# Patient Record
Sex: FEMALE | Race: WHITE | NOT HISPANIC OR LATINO | Employment: OTHER | URBAN - METROPOLITAN AREA
[De-identification: names, ages, dates, MRNs, and addresses within clinical notes are randomized per-mention and may not be internally consistent; named-entity substitution may affect disease eponyms.]

---

## 2017-03-14 DIAGNOSIS — J30.9 ALLERGIC RHINITIS: ICD-10-CM

## 2017-03-15 RX ORDER — FLUTICASONE PROPIONATE 50 MCG
SPRAY, SUSPENSION (ML) NASAL
Qty: 48 G | Refills: 0 | Status: SHIPPED | OUTPATIENT
Start: 2017-03-15 | End: 2017-06-12 | Stop reason: SDUPTHER

## 2017-04-05 ENCOUNTER — PATIENT MESSAGE (OUTPATIENT)
Dept: FAMILY MEDICINE | Facility: CLINIC | Age: 52
End: 2017-04-05

## 2017-04-10 RX ORDER — LORATADINE 10 MG/1
10 TABLET ORAL DAILY
Qty: 90 TABLET | Refills: 3 | Status: SHIPPED | OUTPATIENT
Start: 2017-04-10 | End: 2018-03-18 | Stop reason: SDUPTHER

## 2017-06-12 DIAGNOSIS — Z13.0 SCREENING, ANEMIA, DEFICIENCY, IRON: Primary | ICD-10-CM

## 2017-06-12 DIAGNOSIS — Z13.29 THYROID DISORDER SCREEN: ICD-10-CM

## 2017-06-12 DIAGNOSIS — J30.9 ALLERGIC RHINITIS: ICD-10-CM

## 2017-06-12 DIAGNOSIS — Z13.1 DIABETES MELLITUS SCREENING: ICD-10-CM

## 2017-06-12 DIAGNOSIS — Z13.220 SCREENING CHOLESTEROL LEVEL: ICD-10-CM

## 2017-06-12 DIAGNOSIS — Z11.59 NEED FOR HEPATITIS C SCREENING TEST: ICD-10-CM

## 2017-06-13 RX ORDER — FLUTICASONE PROPIONATE 50 MCG
SPRAY, SUSPENSION (ML) NASAL
Qty: 16 G | Refills: 0 | Status: SHIPPED | OUTPATIENT
Start: 2017-06-13 | End: 2017-06-19 | Stop reason: SDUPTHER

## 2017-06-13 NOTE — TELEPHONE ENCOUNTER
Please call Carly and notify her that I was only able to fill her medication for 1 month because it has been over 1 year since I last seen her.  Advised patient that she can come in for a medication refill visit to get refills for the year or I would recommend patient come in for a FULL WELLNESS exam where we would screen cholesterol, blood sugar, thyroid, etc.  If patient is interested in wellness - send me a note back and I will order labs.

## 2017-06-19 ENCOUNTER — OFFICE VISIT (OUTPATIENT)
Dept: FAMILY MEDICINE | Facility: CLINIC | Age: 52
End: 2017-06-19
Payer: COMMERCIAL

## 2017-06-19 VITALS
OXYGEN SATURATION: 97 % | TEMPERATURE: 98 F | SYSTOLIC BLOOD PRESSURE: 114 MMHG | DIASTOLIC BLOOD PRESSURE: 70 MMHG | BODY MASS INDEX: 25.38 KG/M2 | HEART RATE: 86 BPM | WEIGHT: 164.44 LBS

## 2017-06-19 DIAGNOSIS — G89.29 CHRONIC HIP PAIN, UNSPECIFIED LATERALITY: ICD-10-CM

## 2017-06-19 DIAGNOSIS — M25.559 CHRONIC HIP PAIN, UNSPECIFIED LATERALITY: ICD-10-CM

## 2017-06-19 DIAGNOSIS — J30.2 SEASONAL ALLERGIC RHINITIS, UNSPECIFIED ALLERGIC RHINITIS TRIGGER: Primary | ICD-10-CM

## 2017-06-19 PROCEDURE — 99213 OFFICE O/P EST LOW 20 MIN: CPT | Mod: S$GLB,,, | Performed by: NURSE PRACTITIONER

## 2017-06-19 PROCEDURE — 99999 PR PBB SHADOW E&M-EST. PATIENT-LVL III: CPT | Mod: PBBFAC,,, | Performed by: NURSE PRACTITIONER

## 2017-06-19 RX ORDER — FLUTICASONE PROPIONATE 50 MCG
2 SPRAY, SUSPENSION (ML) NASAL DAILY
Qty: 3 BOTTLE | Refills: 3 | Status: SHIPPED | OUTPATIENT
Start: 2017-06-19 | End: 2018-06-17 | Stop reason: SDUPTHER

## 2017-06-19 NOTE — PROGRESS NOTES
Subjective:       Patient ID: Carly Carmen is a 51 y.o. female.    Chief Complaint: Medication Refill    Patient is here today for medication refills.    Patient has chronic allergic rhinitis with postnasal drip that is now well controlled on Claritin and Flonase nasal spray - needs refill on Flonase.    Patient has chronic hip pain - DJD.  Takes Meloxicam for the hip pain - this is usually prescribed by her GYN MD but advised patient that I can fill medication if she needs it.    Patient voices no complaints today.  States she is here just for her yearly refill - she declined a full physical exam with fasting labs - states she has screenings at work with blood and does not want today.            Previous Medications    ESTRADIOL (ESTRACE) 1 MG TABLET        FLUTICASONE (FLONASE) 50 MCG/ACTUATION NASAL SPRAY    USE 2 SPRAYS IN EACH NOSTRIL ONCE DAILY    LORATADINE (CLARITIN) 10 MG TABLET    Take 1 tablet (10 mg total) by mouth once daily.    MEDROXYPROGESTERONE (PROVERA) 2.5 MG TABLET    Take 2.5 mg by mouth once daily.    MELOXICAM (MOBIC) 15 MG TABLET    Take 15 mg by mouth once daily.       Past Medical History:   Diagnosis Date    Allergy     Chronic hip pain        Past Surgical History:   Procedure Laterality Date    COLONOSCOPY N/A 2016    Procedure: COLONOSCOPY;  Surgeon: Luis Santana Jr., MD;  Location: [facility];  Service: Endoscopy;  Laterality: N/A;    CYST REMOVAL      subcutaneous cyst to right chest wall    DILATION AND CURETTAGE OF UTERUS         Family History   Problem Relation Age of Onset    Cancer Mother 69     Lung Cancer with Brain Mets -  72    Heart disease Father      defibrillator    No Known Problems Brother     No Known Problems Brother        Social History     Social History    Marital status:      Spouse name: N/A    Number of children: 1    Years of education: N/A     Social History Main Topics    Smoking status: Current Every Day Smoker      Packs/day: 0.50     Years: 30.00     Types: Cigarettes    Smokeless tobacco: None    Alcohol use 4.8 oz/week     8 Glasses of wine per week      Comment: weekends    Drug use:      Types: Amphetamines    Sexual activity: Yes     Partners: Male     Other Topics Concern    None     Social History Narrative    She is from [place].  She works for [employer] as an .   She is twice .  Her first   in .  She remarried in .  She has a 25 year-old daughter from her first marriage, who is getting  in 2014.  She lives in [place] with her  and her 18-year-old step son.           Review of Systems   Constitutional: Negative for activity change, appetite change, chills, fatigue, fever and unexpected weight change.   HENT: Negative for congestion, ear pain, hearing loss, mouth sores, nosebleeds, postnasal drip, rhinorrhea, sinus pressure, sneezing, sore throat, trouble swallowing and voice change.    Eyes: Negative for photophobia, pain, discharge, redness, itching and visual disturbance.   Respiratory: Negative for cough, chest tightness, shortness of breath and wheezing.    Cardiovascular: Negative for chest pain, palpitations and leg swelling.   Gastrointestinal: Negative for abdominal pain, blood in stool, constipation, diarrhea, nausea and vomiting.   Endocrine: Negative for polydipsia and polyuria.   Genitourinary: Negative for difficulty urinating, dysuria, frequency, hematuria, menstrual problem and urgency.   Musculoskeletal: Negative for arthralgias, back pain, joint swelling, myalgias and neck pain.   Skin: Negative for color change and rash.   Allergic/Immunologic: Negative for immunocompromised state.   Neurological: Negative for dizziness, seizures, syncope, weakness and headaches.   Hematological: Negative for adenopathy. Does not bruise/bleed easily.   Psychiatric/Behavioral: Negative for agitation,  confusion, dysphoric mood, sleep disturbance and suicidal ideas. The patient is not nervous/anxious.          Objective:     Vitals:    06/19/17 1307   BP: 114/70   BP Location: Right arm   Patient Position: Sitting   BP Method: Manual   Pulse: 86   Temp: 97.8 °F (36.6 °C)   TempSrc: Oral   SpO2: 97%   Weight: 74.6 kg (164 lb 7.4 oz)          Physical Exam   Constitutional: She is oriented to person, place, and time. She appears well-developed and well-nourished.   HENT:   Head: Normocephalic and atraumatic.   Right Ear: External ear normal.   Left Ear: External ear normal.   Nose: Nose normal.   Mouth/Throat: Oropharynx is clear and moist. No oropharyngeal exudate.   Eyes: EOM are normal. Pupils are equal, round, and reactive to light.   Neck: Normal range of motion. Neck supple. No tracheal deviation present. No thyromegaly present.   Cardiovascular: Normal rate, regular rhythm and normal heart sounds.    No murmur heard.  Pulmonary/Chest: Effort normal and breath sounds normal. No respiratory distress.   Abdominal: Soft. She exhibits no distension.   Musculoskeletal: Normal range of motion. She exhibits no edema.   Lymphadenopathy:     She has no cervical adenopathy.   Neurological: She is alert and oriented to person, place, and time. No cranial nerve deficit. Coordination normal.   Skin: Skin is warm and dry. No rash noted.   Psychiatric: She has a normal mood and affect.         Assessment:         ICD-10-CM ICD-9-CM   1. Seasonal allergic rhinitis, unspecified allergic rhinitis trigger J30.2 477.9   2. Chronic hip pain, unspecified laterality M25.559 719.45    G89.29 338.29       Plan:       Seasonal allergic rhinitis, unspecified allergic rhinitis trigger  -  Patient states she gets her Claritin OTC, just needs her Flonase sent to [facility] for the year.  -     fluticasone (FLONASE) 50 mcg/actuation nasal spray; 2 sprays by Each Nare route once daily.  Dispense: 3 Bottle; Refill: 3    Chronic hip pain,  unspecified laterality  -  Takes Meloxicam prescribed by GYN MD for chronic hip pain.      Return in about 1 year (around 6/19/2018).     Patient's Medications   New Prescriptions    No medications on file   Previous Medications    ESTRADIOL (ESTRACE) 1 MG TABLET        LORATADINE (CLARITIN) 10 MG TABLET    Take 1 tablet (10 mg total) by mouth once daily.    MEDROXYPROGESTERONE (PROVERA) 2.5 MG TABLET    Take 2.5 mg by mouth once daily.    MELOXICAM (MOBIC) 15 MG TABLET    Take 15 mg by mouth once daily.   Modified Medications    Modified Medication Previous Medication    FLUTICASONE (FLONASE) 50 MCG/ACTUATION NASAL SPRAY fluticasone (FLONASE) 50 mcg/actuation nasal spray       2 sprays by Each Nare route once daily.    USE 2 SPRAYS IN EACH NOSTRIL ONCE DAILY   Discontinued Medications    HYDROCODONE-ACETAMINOPHEN 5-325MG (NORCO) 5-325 MG PER TABLET    Take 1-2 tablets PO q4-6hours PRN pain

## 2017-10-05 ENCOUNTER — OFFICE VISIT (OUTPATIENT)
Dept: FAMILY MEDICINE | Facility: CLINIC | Age: 52
End: 2017-10-05
Payer: COMMERCIAL

## 2017-10-05 VITALS
WEIGHT: 165.38 LBS | BODY MASS INDEX: 25.07 KG/M2 | SYSTOLIC BLOOD PRESSURE: 128 MMHG | OXYGEN SATURATION: 98 % | HEART RATE: 87 BPM | TEMPERATURE: 98 F | DIASTOLIC BLOOD PRESSURE: 76 MMHG | HEIGHT: 68 IN

## 2017-10-05 DIAGNOSIS — E78.5 HYPERLIPIDEMIA, UNSPECIFIED HYPERLIPIDEMIA TYPE: ICD-10-CM

## 2017-10-05 DIAGNOSIS — J30.9 ALLERGIC SINUSITIS: ICD-10-CM

## 2017-10-05 DIAGNOSIS — Z00.00 ANNUAL PHYSICAL EXAM: Primary | ICD-10-CM

## 2017-10-05 PROCEDURE — 99396 PREV VISIT EST AGE 40-64: CPT | Mod: S$GLB,,, | Performed by: NURSE PRACTITIONER

## 2017-10-05 PROCEDURE — 99999 PR PBB SHADOW E&M-EST. PATIENT-LVL IV: CPT | Mod: PBBFAC,,, | Performed by: NURSE PRACTITIONER

## 2017-10-05 RX ORDER — ROSUVASTATIN CALCIUM 10 MG/1
10 TABLET, COATED ORAL DAILY
Qty: 90 TABLET | Refills: 1 | Status: SHIPPED | OUTPATIENT
Start: 2017-10-05 | End: 2018-03-18 | Stop reason: SDUPTHER

## 2017-10-05 NOTE — PATIENT INSTRUCTIONS
"  Controlling Your Cholesterol  Cholesterol is a waxy substance. It travels in your blood through the blood vessels. When you have high cholesterol, it builds up in the walls of the blood vessels. This makes the vessels narrower. Blood flow decreases. You are then at greater risk for having a heart attack or a stroke.  Good and bad cholesterol  Lipids are fats. Blood is mostly water. Fat and water don't mix. So our bodies need lipoproteins (lipids inside a protein shell) to carry the lipids. The protein shell carries its lipids through the bloodstream. There are two main kinds of lipoproteins:  · LDL (low-density lipoprotein) is known as "bad cholesterol." It mainly carries cholesterol. It delivers this cholesterol to body cells. Excess LDL cholesterol will build up in artery walls. This increases your risk for heart disease and stroke.  · HDL (high-density lipoprotein) is known as "good cholesterol." This protein shell collects excess cholesterol that LDLs have left behind on blood vessel walls. That's why high levels of HDL cholesterol can decrease your risk of heart disease and stroke.  Controlling cholesterol levels  Total cholesterol includes LDL and HDL cholesterol, as well as other fats in the bloodstream. If your total cholesterol is high, follow the steps below to help lower your total cholesterol level:  · Eat less unhealthy fat:  ¨ Cut back on saturated fats and trans (also called hydrogenated) fats by selecting lean cuts of meat, low-fat dairy, and using oils instead of solid fats. Limit baked goods, processed meats, and fried foods. A diet thats high in these fats increases your bad cholesterol. It's not enough to just cut back on foods containing cholesterol.  ¨ Eat about 2 servings of fish per week. Most fish contain omega-3 fatty acids. These help lower blood cholesterol.  ¨ Eat more whole grains and soluble fiber (such as oat bran). These lower overall cholesterol.  · Be active:  ¨ Choose an " activity you enjoy. Walking, swimming, and riding a bike are some good ways to be active.  ¨ Start at a level where you feel comfortable. Increase your time and pace a little each week.  ¨ Work up to 40 minutes of moderate to high intensity physical activity at least 3 to 4 days per week.  ¨ Remember, some activity is better than none.  ¨ If you haven't been exercising regularly, start slowly. Check with your doctor to make sure the exercise plan is right for you.  · Quit smoking. Quitting smoking can improve your lipid levels. It also lowers your risk for heart disease and stroke.  · Weight management. If you are overweight or obese, your health care provider will work with you to lose weight and lower your BMI (body mass index) to a normal or near-normal level. Making diet changes and increasing physical activity can help.  · Take medication as directed. Many people need medication to get their LDL levels to a safe level. Medication to lower cholesterol levels is effective and safe. (But taking medication is not a substitute for exercise or watching your diet!) Your doctor can tell you whether you might benefit from a cholesterol-lowering medication.  Date Last Reviewed: 5/11/2015  © 1079-7350 [employer]. [address] [place] [address], [place] [address]. All rights reserved. This information is not intended as a substitute for professional medical care. Always follow your healthcare professional's instructions.      Low-Cholesterol Diet  Your body needs cholesterol to build new cells and create certain hormones. There are 2 kinds of cholesterol in your blood:     · HDL (good) cholesterol. This prevents fat deposits (plaque) from building up in your arteries. In this way it protects against heart disease and stroke.  · LDL (bad) cholesterol. This stays in your body and sticks to artery walls. Over time it may block blood flow to the heart and brain. This can cause a heart attack or stroke.  The  cholesterol in your blood comes from 2 sources: cholesterol in food that you eat and cholesterol that your liver makes. You should limit the amount of cholesterol in your diet. But the cholesterol that your body makes has the greatest disease risk. And your body makes more cholesterol when your diet is high in bad fats (saturated and trans fats). There are 2 kinds of fats you can eat:  · Good fats, or unsaturated fats (mono-unsaturated and poly-unsaturated). They raise the level of good cholesterol and lower the level of bad cholesterol. Good fats are found in vegetable oils such as olive, sunflower, corn, and soybean oils, and in nuts and seeds.  · Bad fats, or saturated fats (including foods high in cholesterol) and trans fats. These raise your risk of disease. They lower the good cholesterol and raise the level of bad cholesterol. Bad fats are found in animal products, including meat, whole-milk dairy products, and butter. Some plants are also high in bad fats (coconut and palm plants). Trans fats are found in hard (stick) margarines. They are also in many fast foods and commercially baked goods. Soft margarine sold in tubs has fewer trans fats and is safer to use.  High blood cholesterol is usually due to a diet high in saturated fat, along with not being physically active. In some cases, genetics plays a role in causing high cholesterol. The tips below will help you create healthy eating habits that will help lower your blood cholesterol level.  Create a diet high in good fats, low in bad fats (and low in cholesterol)  The following steps will help you create a diet high in good fats and low in bad fats:  · Talk with your doctor before starting a low cholesterol diet or weight loss program.  · Learn to read nutrition labels and select appropriate portion sizes.  · When cooking, use plant-based unsaturated vegetable oils (sunflower, corn, soybean, canola, peanut, and olive oils).  · Avoid saturated fats found in  animal products such as meat, dairy (whole-milk, cheese and ice cream), poultry skin, and egg yolks. Plants high in saturated oils include coconut oil, palm oil, and palm kernel oil.  · If you eat meat, choose smaller portions and lean cuts, such as round, linnea, sirloin, or loin. Eat more meatless meals.  · Replace meat with fish at least 2 times a week. Fish is an important source of the unsaturated fat called omega-3 fatty acids. This fat has potential to lower the risk of heart disease.  · Replace whole-milk dairy products with low-fat or nonfat products. Try soy products. Soy helps to reduce total cholesterol.  · Supplement your diet with protective fibers. Eat nuts, seeds, and whole grains rather than white rice and bread. These foods lower both cholesterol and triglyceride levels. (Triglycerides are another fat found in the blood.) Walnuts are one of the best sources of omega-3 fatty acids.  · Eat plenty of fresh fruits and vegetables daily.  · Avoid fast foods and commercial baked goods. Assume they contain saturated fat unless labeled otherwise.  Date Last Reviewed: 8/1/2016  © 6833-9596 [employer]. [address] [place] [address], [place] [address]. All rights reserved. This information is not intended as a substitute for professional medical care. Always follow your healthcare professional's instructions.

## 2017-10-05 NOTE — PROGRESS NOTES
Subjective:       Patient ID: Carly Carmen is a 52 y.o. female.    Chief Complaint: Annual Exam (wellness)    Patient is a 52 year old white female here today for annual physical exam with fasting lab results.    Patient has chronic allergic rhinitis with postnasal drip that is now well controlled on Claritin and Flonase nasal spray.     Patient has chronic hip pain - DJD.  Takes Meloxicam for the hip pain - this is usually prescribed by her GYN MD but advised patient that I can fill medication if she needs it.     Patient voices no complaints today.     WELLNESS LABS:  -  CBC within acceptable range.  -  CMP within normal limits  -  TSH WNL  -  Hepatitis C screening is negative.  -  ELEVATED cholesterol levels - total 280 with .6 - advised we start medication to control cholesterol levels. Patient already follows a healthy diet and gets regular exercise.    Health Maintenance:  -  Declined flu vaccine.      Component      Latest Ref Rng & Units 10/3/2017 4/11/2016 1/6/2006   WBC      3.90 - 12.70 K/uL 6.08 6.59    RBC      4.00 - 5.40 M/uL 4.90 4.65    Hemoglobin      12.0 - 16.0 g/dL 15.9 14.9    Hematocrit      37.0 - 48.5 % 45.3 43.4    MCV      82 - 98 fL 92 93    MCH      27.0 - 31.0 pg 32.4 (H) 32.0 (H)    MCHC      32.0 - 36.0 g/dL 35.1 34.3    RDW      11.5 - 14.5 % 13.1 13.0    Platelets      150 - 350 K/uL 284 259    MPV      9.2 - 12.9 fL 9.7 10.1    Gran #      1.8 - 7.7 K/uL 3.5 3.3    Lymph #      1.0 - 4.8 K/uL 1.9 2.6    Mono #      0.3 - 1.0 K/uL 0.4 0.5    Eos #      0.0 - 0.5 K/uL 0.3 0.2    Baso #      0.00 - 0.20 K/uL 0.04 0.08    Gran%      38.0 - 73.0 % 56.8 49.4    Lymph%      18.0 - 48.0 % 30.8 39.3    Mono%      4.0 - 15.0 % 7.1 7.4    Eosinophil%      0.0 - 8.0 % 4.6 2.4    Basophil%      0.0 - 1.9 % 0.7 1.2    Differential Method       Automated Automated    Sodium      136 - 145 mmol/L 143 141    Potassium      3.5 - 5.1 mmol/L 4.0 3.9    Chloride      95 - 110 mmol/L 107  109    CO2      23 - 29 mmol/L 27 23    Glucose      70 - 110 mg/dL 101 85    BUN, Bld      7 - 17 mg/dL 10 13    Creatinine      0.50 - 1.40 mg/dL 0.58 0.6    Calcium      8.7 - 10.5 mg/dL 9.4 9.0    Total Protein      6.0 - 8.4 g/dL 7.3     Albumin      3.5 - 5.2 g/dL 4.3     Total Bilirubin      0.1 - 1.0 mg/dL 0.7     Alkaline Phosphatase      38 - 126 U/L 66     AST      15 - 46 U/L 18     ALT      10 - 44 U/L 30     Anion Gap      8 - 16 mmol/L 9 9    eGFR if African American      >60 mL/min/1.73 m:2 >60.0 >60    eGFR if non African American      >60 mL/min/1.73 m:2 >60.0 >60    Cholesterol      120 - 199 mg/dL 280 (H)     Triglycerides      30 - 150 mg/dL 237 (H)     HDL      40 - 75 mg/dL 47     LDL Cholesterol      63.0 - 159.0 mg/dL 185.6 (H)     HDL/Chol Ratio      20.0 - 50.0 % 16.8 (L)     Total Cholesterol/HDL Ratio      2.0 - 5.0 6.0 (H)     Non-HDL Cholesterol      mg/dL 233     TSH      0.400 - 4.000 uIU/mL 1.360  1.0   Hepatitis C Ab       Negative         Previous Medications    ESTRADIOL (ESTRACE) 1 MG TABLET        FLUTICASONE (FLONASE) 50 MCG/ACTUATION NASAL SPRAY    2 sprays by Each Nare route once daily.    LORATADINE (CLARITIN) 10 MG TABLET    Take 1 tablet (10 mg total) by mouth once daily.    MEDROXYPROGESTERONE (PROVERA) 2.5 MG TABLET    Take 2.5 mg by mouth once daily.    MELOXICAM (MOBIC) 15 MG TABLET    Take 15 mg by mouth once daily.       Past Medical History:   Diagnosis Date    Allergy     Chronic hip pain        Past Surgical History:   Procedure Laterality Date    COLONOSCOPY N/A 2016    Procedure: COLONOSCOPY;  Surgeon: Luis Santana Jr., MD;  Location: [facility];  Service: Endoscopy;  Laterality: N/A;    CYST REMOVAL      subcutaneous cyst to right chest wall    DILATION AND CURETTAGE OF UTERUS         Family History   Problem Relation Age of Onset    Cancer Mother 69     Lung Cancer with Brain Mets -  72    Heart disease Father      defibrillator    No  Known Problems Brother     No Known Problems Brother        Social History     Social History    Marital status:      Spouse name: N/A    Number of children: 1    Years of education: N/A     Social History Main Topics    Smoking status: Current Every Day Smoker     Packs/day: 0.50     Years: 30.00     Types: Cigarettes    Smokeless tobacco: Never Used    Alcohol use 4.8 oz/week     8 Glasses of wine per week      Comment: weekends    Drug use:      Types: Amphetamines    Sexual activity: Yes     Partners: Male     Other Topics Concern    None     Social History Narrative    She is from [place].  She works for [employer] as an .   She is twice .  Her first   in .  She remarried in .  She has a 25 year-old daughter from her first marriage, who is getting  in 2014.  She lives in [place] with her  and her 18-year-old step son.           Review of Systems   Constitutional: Negative for activity change, appetite change, chills, fatigue, fever and unexpected weight change.   HENT: Negative for congestion, ear pain, hearing loss, mouth sores, nosebleeds, postnasal drip, rhinorrhea, sinus pressure, sneezing, sore throat, trouble swallowing and voice change.    Eyes: Negative for photophobia, pain, discharge, redness, itching and visual disturbance.   Respiratory: Negative for cough, chest tightness, shortness of breath and wheezing.    Cardiovascular: Negative for chest pain, palpitations and leg swelling.   Gastrointestinal: Negative for abdominal pain, blood in stool, constipation, diarrhea, nausea and vomiting.   Endocrine: Negative for polydipsia and polyuria.   Genitourinary: Negative for difficulty urinating, dysuria, frequency, hematuria, menstrual problem and urgency.   Musculoskeletal: Negative for arthralgias, back pain, joint swelling and myalgias.   Skin: Negative for color change and rash.  "  Allergic/Immunologic: Negative for immunocompromised state.   Neurological: Negative for dizziness, seizures, syncope, weakness and headaches.   Hematological: Negative for adenopathy. Does not bruise/bleed easily.   Psychiatric/Behavioral: Negative for agitation, confusion, dysphoric mood, sleep disturbance and suicidal ideas. The patient is not nervous/anxious.          Objective:     Vitals:    10/05/17 1312   BP: 128/76   BP Location: Right arm   Patient Position: Sitting   BP Method: Medium (Manual)   Pulse: 87   Temp: 97.7 °F (36.5 °C)   TempSrc: Oral   SpO2: 98%   Weight: 75 kg (165 lb 5.5 oz)   Height: 5' 7.5" (1.715 m)          Physical Exam   Constitutional: She is oriented to person, place, and time. She appears well-developed and well-nourished. No distress.   Body mass index is 25.51 kg/m².     HENT:   Head: Normocephalic and atraumatic.   Right Ear: External ear normal.   Left Ear: External ear normal.   Nose: Nose normal.   Mouth/Throat: Oropharynx is clear and moist. No oropharyngeal exudate.   Eyes: EOM are normal. Pupils are equal, round, and reactive to light.   Neck: Normal range of motion. Neck supple. No tracheal deviation present. No thyromegaly present.   Cardiovascular: Normal rate, regular rhythm and normal heart sounds.    No murmur heard.  Pulmonary/Chest: Effort normal and breath sounds normal. No respiratory distress.   Abdominal: Soft. She exhibits no distension.   Musculoskeletal: Normal range of motion. She exhibits no edema.   Lymphadenopathy:     She has no cervical adenopathy.   Neurological: She is alert and oriented to person, place, and time. No cranial nerve deficit. Coordination normal.   Skin: Skin is warm and dry. No rash noted. She is not diaphoretic.   Psychiatric: She has a normal mood and affect.         Assessment:         ICD-10-CM ICD-9-CM   1. Annual physical exam Z00.00 V70.0   2. Hyperlipidemia, unspecified hyperlipidemia type E78.5 272.4   3. Allergic sinusitis " J30.9 477.9       Plan:       Annual physical exam  -  Declined flu vaccine  -  Will administer Tdap at next visit.  -  Declined smoking cessation program.  Health Maintenance Summary     TETANUS VACCINE Overdue 9/21/1981    Pneumococcal PPSV23 (Medium Risk) Overdue 9/21/1981    Mammogram Next Due 6/15/2018     Done 6/15/2017 DIS    Done 1/4/2016 DIS    Done 10/28/2015 [employer]: WORKFLOW - HEALTHY PLANET - EXTERNAL DATA - EXTERNAL PROCEDURES DATE - MAMMOGRAM    Done 10/23/2015 [employer]: WORKFLOW - HEALTHY PLANET - EXTERNAL DATA - EXTERNAL PROCEDURES DATE - MAMMOGRAM    Done 8/15/2014 [employer]: WORKFLOW - HEALTHY PLANET - EXTERNAL DATA - EXTERNAL PROCEDURES DATE - MAMMOGRAM   Pap Smear with HPV Cotest Next Due 12/12/2019     Done 12/12/2016     Done 10/25/2015 Dr. Shelbi Rubio    Done 10/9/2015 [employer]: FINDINGS - EXTERNAL LAB DATE - PAP SMEAR    Done 8/8/2014 [employer]: FINDINGS - EXTERNAL LAB DATE - PAP SMEAR   Lipid Panel Next Due 10/3/2022     Done 10/3/2017 LIPID PANEL (A)    Done 10/28/2015 through work    Done 9/23/2015 [employer]: WORKFLOW - HEALTHY PLANET - EXTERNAL DATA - EXTERNAL LAB DATE - LIPID PANEL   Colonoscopy Next Due 1/8/2026     Done 1/8/2016 COLONOSCOPY   Hepatitis C Screening Completed     Done 10/3/2017 HEPATITIS C ANTIBODY   Influenza Vaccine Addressed     Declined 10/5/2017     Declined 11/25/2015          Hyperlipidemia, unspecified hyperlipidemia type  -  Start Crestor 10 mg daily.  Repeat fasting labs and office visit in 3 months.  -     rosuvastatin (CRESTOR) 10 MG tablet; Take 1 tablet (10 mg total) by mouth once daily.  Dispense: 90 tablet; Refill: 1  -     Comprehensive metabolic panel; Future; Expected date: 01/02/2018  -     Lipid panel; Future; Expected date: 01/02/2018    Allergic sinusitis  -  Controlled on present medications.      Return in about 3 months (around 1/5/2018) for fasting labs and then office visit.     Patient's Medications   New Prescriptions    No  medications on file   Previous Medications    ESTRADIOL (ESTRACE) 1 MG TABLET        FLUTICASONE (FLONASE) 50 MCG/ACTUATION NASAL SPRAY    2 sprays by Each Nare route once daily.    LORATADINE (CLARITIN) 10 MG TABLET    Take 1 tablet (10 mg total) by mouth once daily.    MEDROXYPROGESTERONE (PROVERA) 2.5 MG TABLET    Take 2.5 mg by mouth once daily.    MELOXICAM (MOBIC) 15 MG TABLET    Take 15 mg by mouth once daily.   Modified Medications    No medications on file   Discontinued Medications    No medications on file

## 2018-03-18 DIAGNOSIS — E78.5 HYPERLIPIDEMIA, UNSPECIFIED HYPERLIPIDEMIA TYPE: ICD-10-CM

## 2018-03-19 ENCOUNTER — PATIENT MESSAGE (OUTPATIENT)
Dept: FAMILY MEDICINE | Facility: CLINIC | Age: 53
End: 2018-03-19

## 2018-03-19 RX ORDER — ROSUVASTATIN CALCIUM 10 MG/1
TABLET, COATED ORAL
Qty: 90 TABLET | Refills: 0 | Status: SHIPPED | OUTPATIENT
Start: 2018-03-19 | End: 2018-06-17 | Stop reason: SDUPTHER

## 2018-03-19 RX ORDER — LORATADINE 10 MG/1
TABLET ORAL
Qty: 90 TABLET | Refills: 0 | Status: SHIPPED | OUTPATIENT
Start: 2018-03-19 | End: 2018-06-17 | Stop reason: SDUPTHER

## 2018-06-17 DIAGNOSIS — J30.2 SEASONAL ALLERGIC RHINITIS: ICD-10-CM

## 2018-06-17 DIAGNOSIS — E78.5 HYPERLIPIDEMIA, UNSPECIFIED HYPERLIPIDEMIA TYPE: ICD-10-CM

## 2018-06-19 ENCOUNTER — PATIENT MESSAGE (OUTPATIENT)
Dept: FAMILY MEDICINE | Facility: CLINIC | Age: 53
End: 2018-06-19

## 2018-06-19 RX ORDER — ROSUVASTATIN CALCIUM 10 MG/1
TABLET, COATED ORAL
Qty: 30 TABLET | Refills: 0 | Status: SHIPPED | OUTPATIENT
Start: 2018-06-19 | End: 2018-08-09 | Stop reason: SDUPTHER

## 2018-06-19 RX ORDER — LORATADINE 10 MG/1
TABLET ORAL
Qty: 30 TABLET | Refills: 0 | Status: SHIPPED | OUTPATIENT
Start: 2018-06-19 | End: 2021-04-22

## 2018-06-19 RX ORDER — FLUTICASONE PROPIONATE 50 MCG
SPRAY, SUSPENSION (ML) NASAL
Qty: 16 G | Refills: 0 | Status: SHIPPED | OUTPATIENT
Start: 2018-06-19 | End: 2020-10-07 | Stop reason: SDUPTHER

## 2018-06-19 NOTE — TELEPHONE ENCOUNTER
Advise patient she was notified since March that we were overdue for fasting labs and office visit so I had to only fill a 1 month supply - needs fasting labs and office visit this month or will have to refuse future refills.

## 2018-06-22 ENCOUNTER — TELEPHONE (OUTPATIENT)
Dept: FAMILY MEDICINE | Facility: CLINIC | Age: 53
End: 2018-06-22

## 2018-06-22 NOTE — TELEPHONE ENCOUNTER
----- Message from Chelsea Mccarthy sent at 6/22/2018  6:36 AM CDT -----  Contact: self  Please see comment below    Appointment Request From: Carly Carmen    With Provider: Other - (see comments)    Would Accept With:Request to schedule a test or procedure    Preferred Date Range: Any date 6/21/2018 or later    Preferred Times: Any    Reason for visit: Request an Appt    Comments:  Mammogram

## 2018-06-26 DIAGNOSIS — Z12.31 VISIT FOR SCREENING MAMMOGRAM: Primary | ICD-10-CM

## 2018-07-09 ENCOUNTER — PATIENT MESSAGE (OUTPATIENT)
Dept: FAMILY MEDICINE | Facility: CLINIC | Age: 53
End: 2018-07-09

## 2018-07-12 DIAGNOSIS — R92.8 ABNORMAL MAMMOGRAM: Primary | ICD-10-CM

## 2018-07-30 ENCOUNTER — PATIENT MESSAGE (OUTPATIENT)
Dept: FAMILY MEDICINE | Facility: CLINIC | Age: 53
End: 2018-07-30

## 2018-08-09 ENCOUNTER — OFFICE VISIT (OUTPATIENT)
Dept: FAMILY MEDICINE | Facility: CLINIC | Age: 53
End: 2018-08-09
Payer: COMMERCIAL

## 2018-08-09 VITALS
HEART RATE: 77 BPM | SYSTOLIC BLOOD PRESSURE: 120 MMHG | HEIGHT: 68 IN | WEIGHT: 165.88 LBS | BODY MASS INDEX: 25.14 KG/M2 | DIASTOLIC BLOOD PRESSURE: 84 MMHG | TEMPERATURE: 98 F | OXYGEN SATURATION: 94 %

## 2018-08-09 DIAGNOSIS — J30.89 NON-SEASONAL ALLERGIC RHINITIS, UNSPECIFIED TRIGGER: ICD-10-CM

## 2018-08-09 DIAGNOSIS — N95.1 MENOPAUSAL SYNDROME: ICD-10-CM

## 2018-08-09 DIAGNOSIS — M25.559 CHRONIC HIP PAIN, UNSPECIFIED LATERALITY: ICD-10-CM

## 2018-08-09 DIAGNOSIS — Z00.00 ROUTINE GENERAL MEDICAL EXAMINATION AT A HEALTH CARE FACILITY: Primary | ICD-10-CM

## 2018-08-09 DIAGNOSIS — E78.5 HYPERLIPIDEMIA, UNSPECIFIED HYPERLIPIDEMIA TYPE: ICD-10-CM

## 2018-08-09 DIAGNOSIS — G89.29 CHRONIC HIP PAIN, UNSPECIFIED LATERALITY: ICD-10-CM

## 2018-08-09 PROCEDURE — 99999 PR PBB SHADOW E&M-EST. PATIENT-LVL III: CPT | Mod: PBBFAC,,, | Performed by: INTERNAL MEDICINE

## 2018-08-09 PROCEDURE — 99396 PREV VISIT EST AGE 40-64: CPT | Mod: S$GLB,,, | Performed by: INTERNAL MEDICINE

## 2018-08-09 RX ORDER — ROSUVASTATIN CALCIUM 10 MG/1
10 TABLET, COATED ORAL DAILY
Qty: 90 TABLET | Refills: 3 | Status: SHIPPED | OUTPATIENT
Start: 2018-08-09 | End: 2019-08-04 | Stop reason: SDUPTHER

## 2018-08-09 NOTE — PATIENT INSTRUCTIONS
I have reviewed the PM,  and  for this patient. I have refilled your crestor. I reviewed your recent labs from June and your mammogram. You are generally healthy. Keep follow-up with GYN.

## 2018-08-09 NOTE — PROGRESS NOTES
Subjective:       Patient ID: Carly Carmen is a 52 y.o. female.    Chief Complaint: Annual Exam     I have reviewed the PMH, SH and FH for this patient. This is a new patient to me. This patient is a former patient of Becca martin who is here to establish care. She has retired from her job at  [employer] [place] She has had recent labs done here and we reviewed those. She has a PMH significant for hyperlipidemia and chronic hip arthritis. She also sees a GYN who treats her for menopausal syndrome. She had a recent mammogram and was called back for additional views, but it is ok. She has chronic allergies. She has a prescription for loratadine and flonase, but she often takes claritin D too.       Review of Systems   Constitutional: Negative for activity change, appetite change, chills, fatigue, fever and unexpected weight change.   HENT: Positive for sinus pressure. Negative for congestion, ear discharge, ear pain, hearing loss, mouth sores, postnasal drip, rhinorrhea, sinus pain, sore throat and trouble swallowing.    Eyes: Negative for discharge, redness, itching and visual disturbance.   Respiratory: Negative for cough, chest tightness, shortness of breath and wheezing.    Cardiovascular: Negative for chest pain, palpitations and leg swelling.   Gastrointestinal: Negative for abdominal pain, blood in stool, constipation, diarrhea, nausea and vomiting.   Endocrine: Negative for cold intolerance, heat intolerance, polydipsia and polyuria.   Genitourinary: Negative for difficulty urinating, dysuria, flank pain, frequency, hematuria, menstrual problem, pelvic pain, urgency, vaginal bleeding and vaginal discharge.   Musculoskeletal: Negative for arthralgias, back pain, joint swelling, myalgias, neck pain and neck stiffness.   Skin: Negative for rash and wound.   Neurological: Negative for dizziness, tremors, syncope, speech difficulty, weakness,  light-headedness, numbness and headaches.   Hematological: Negative for adenopathy. Does not bruise/bleed easily.   Psychiatric/Behavioral: Negative for agitation, confusion, decreased concentration, dysphoric mood and sleep disturbance. The patient is not nervous/anxious.        Objective:      Physical Exam   Constitutional: She is oriented to person, place, and time. She appears well-developed and well-nourished. No distress.   HENT:   Head: Normocephalic and atraumatic.   Right Ear: External ear normal. Tympanic membrane is not erythematous. No middle ear effusion.   Left Ear: External ear normal. Tympanic membrane is not erythematous.  No middle ear effusion.   Nose: Nose normal.   Mouth/Throat: Oropharynx is clear and moist and mucous membranes are normal. No oropharyngeal exudate or posterior oropharyngeal erythema.   Eyes: EOM are normal. Pupils are equal, round, and reactive to light. Right eye exhibits no discharge. Left eye exhibits no discharge. Right conjunctiva is injected. Left conjunctiva is injected. No scleral icterus.   Neck: Normal range of motion. Neck supple. No tracheal deviation present. No thyromegaly present.   Cardiovascular: Normal rate, regular rhythm, normal heart sounds and intact distal pulses.  Exam reveals no gallop and no friction rub.    No murmur heard.  Pulmonary/Chest: Effort normal and breath sounds normal. No respiratory distress. She has no wheezes. She has no rales. She exhibits no tenderness.   Abdominal: Soft. Bowel sounds are normal. She exhibits no distension and no mass. There is no tenderness. There is no guarding. No hernia.   Musculoskeletal: Normal range of motion. She exhibits no edema or tenderness.   Lymphadenopathy:     She has no cervical adenopathy.   Neurological: She is alert and oriented to person, place, and time. She displays normal reflexes. No cranial nerve deficit or sensory deficit.   Skin: Skin is warm and dry. No rash noted. She is not diaphoretic.  No erythema. No pallor.   Psychiatric: She has a normal mood and affect. Her behavior is normal. Judgment normal.   Nursing note and vitals reviewed.      Assessment and Plan:     Problem List Items Addressed This Visit     Chronic hip pain - continue meloxicam. We discussed taking with food to avoid stomach irritation.       Allergic rhinitis - I advised her to be careful not to take claritin D on top of routine claritin. If she needs a decongestant she can take plain sudafed.       Hyperlipidemia - I refilled her crestor. No complications.     Relevant Medications    rosuvastatin (CRESTOR) 10 MG tablet    Menopausal syndrome - f/up with GYN.       Other Visit Diagnoses     Routine general medical examination at a health care facility    -  Primary - generally normal exam.

## 2018-08-10 ENCOUNTER — PATIENT MESSAGE (OUTPATIENT)
Dept: FAMILY MEDICINE | Facility: CLINIC | Age: 53
End: 2018-08-10

## 2018-08-10 NOTE — TELEPHONE ENCOUNTER
Spoke with pt inform that the labs that were do was for her 3 to 6 month F/U inform pt that her Annual is not due til October, pt understood and wanted to know could this be her Annual inform pt no Annual not due til October, pt understood.

## 2018-12-14 ENCOUNTER — TELEPHONE (OUTPATIENT)
Dept: FAMILY MEDICINE | Facility: CLINIC | Age: 53
End: 2018-12-14

## 2018-12-14 NOTE — TELEPHONE ENCOUNTER
Attempted to call patient and left message that there were no available appointments today but she would be put on the wait list. Next available would be on Monday

## 2018-12-14 NOTE — TELEPHONE ENCOUNTER
----- Message from Bhumika Keith sent at 12/14/2018  7:05 AM CST -----  Contact: [employer] request  Message     ----- Message from [employer], System Message sent at 12/12/2018  9:06 AM CST -----    Appointment Request From: Carly Carmen    With Provider: Elsy Caceres MD [[facility]]    Preferred Date Range: 12/12/2018 - 12/14/2018    Preferred Times: Any time    Reason for visit: Existing Patient    Comments:  I have a sore throat today.

## 2019-03-14 LAB — HUMAN PAPILLOMAVIRUS (HPV): NORMAL

## 2019-08-04 DIAGNOSIS — E78.5 HYPERLIPIDEMIA, UNSPECIFIED HYPERLIPIDEMIA TYPE: ICD-10-CM

## 2019-08-05 RX ORDER — ROSUVASTATIN CALCIUM 10 MG/1
TABLET, COATED ORAL
Qty: 30 TABLET | Refills: 0 | Status: SHIPPED | OUTPATIENT
Start: 2019-08-05 | End: 2021-04-22

## 2019-08-05 NOTE — TELEPHONE ENCOUNTER
Attempted to reach patient, phone number disconnected. Attempted to reach patient contact, Marc, no answer, left message to ask patient to call office.

## 2019-08-20 ENCOUNTER — PATIENT MESSAGE (OUTPATIENT)
Dept: FAMILY MEDICINE | Facility: CLINIC | Age: 54
End: 2019-08-20

## 2019-08-21 ENCOUNTER — OFFICE VISIT (OUTPATIENT)
Dept: FAMILY MEDICINE | Facility: CLINIC | Age: 54
End: 2019-08-21
Payer: COMMERCIAL

## 2019-08-21 VITALS
BODY MASS INDEX: 25.97 KG/M2 | HEIGHT: 68 IN | RESPIRATION RATE: 18 BRPM | WEIGHT: 171.38 LBS | DIASTOLIC BLOOD PRESSURE: 80 MMHG | SYSTOLIC BLOOD PRESSURE: 110 MMHG | TEMPERATURE: 98 F | HEART RATE: 84 BPM | OXYGEN SATURATION: 98 %

## 2019-08-21 DIAGNOSIS — N30.01 ACUTE CYSTITIS WITH HEMATURIA: Primary | ICD-10-CM

## 2019-08-21 DIAGNOSIS — B35.1 ONYCHOMYCOSIS: ICD-10-CM

## 2019-08-21 PROBLEM — E78.2 MIXED HYPERLIPIDEMIA: Status: ACTIVE | Noted: 2017-10-05

## 2019-08-21 LAB
BILIRUB SERPL-MCNC: ABNORMAL MG/DL
BLOOD URINE, POC: 250
COLOR, POC UA: YELLOW
GLUCOSE UR QL STRIP: NORMAL
KETONES UR QL STRIP: ABNORMAL
LEUKOCYTE ESTERASE URINE, POC: ABNORMAL
NITRITE, POC UA: POSITIVE
PH, POC UA: 7
PROTEIN, POC: 30
SPECIFIC GRAVITY, POC UA: 1
UROBILINOGEN, POC UA: NORMAL

## 2019-08-21 PROCEDURE — 3008F PR BODY MASS INDEX (BMI) DOCUMENTED: ICD-10-PCS | Mod: CPTII,S$GLB,, | Performed by: FAMILY MEDICINE

## 2019-08-21 PROCEDURE — 99999 PR PBB SHADOW E&M-EST. PATIENT-LVL IV: ICD-10-PCS | Mod: PBBFAC,,, | Performed by: FAMILY MEDICINE

## 2019-08-21 PROCEDURE — 3008F BODY MASS INDEX DOCD: CPT | Mod: CPTII,S$GLB,, | Performed by: FAMILY MEDICINE

## 2019-08-21 PROCEDURE — 87077 CULTURE AEROBIC IDENTIFY: CPT

## 2019-08-21 PROCEDURE — 81002 URINALYSIS NONAUTO W/O SCOPE: CPT | Mod: S$GLB,,, | Performed by: FAMILY MEDICINE

## 2019-08-21 PROCEDURE — 99999 PR PBB SHADOW E&M-EST. PATIENT-LVL IV: CPT | Mod: PBBFAC,,, | Performed by: FAMILY MEDICINE

## 2019-08-21 PROCEDURE — 87186 SC STD MICRODIL/AGAR DIL: CPT

## 2019-08-21 PROCEDURE — 87086 URINE CULTURE/COLONY COUNT: CPT

## 2019-08-21 PROCEDURE — 99214 PR OFFICE/OUTPT VISIT, EST, LEVL IV, 30-39 MIN: ICD-10-PCS | Mod: 25,S$GLB,, | Performed by: FAMILY MEDICINE

## 2019-08-21 PROCEDURE — 99214 OFFICE O/P EST MOD 30 MIN: CPT | Mod: 25,S$GLB,, | Performed by: FAMILY MEDICINE

## 2019-08-21 PROCEDURE — 81002 POCT URINE DIPSTICK WITHOUT MICROSCOPE: ICD-10-PCS | Mod: S$GLB,,, | Performed by: FAMILY MEDICINE

## 2019-08-21 PROCEDURE — 87088 URINE BACTERIA CULTURE: CPT

## 2019-08-21 RX ORDER — CIPROFLOXACIN 500 MG/1
500 TABLET ORAL EVERY 12 HOURS
Qty: 10 TABLET | Refills: 0 | Status: SHIPPED | OUTPATIENT
Start: 2019-08-21 | End: 2020-05-05

## 2019-08-21 RX ORDER — CICLOPIROX 80 MG/ML
SOLUTION TOPICAL NIGHTLY
Qty: 6.6 ML | Refills: 11 | Status: SHIPPED | OUTPATIENT
Start: 2019-08-21 | End: 2020-05-05

## 2019-08-21 NOTE — PROGRESS NOTES
"FAMILY MEDICINE    Patient Active Problem List   Diagnosis    Chronic hip pain    Allergic rhinitis    Mixed hyperlipidemia    Postmenopausal disorder    Acute cystitis with hematuria    Onychomycosis       CC:   Chief Complaint   Patient presents with    Urinary Tract Infection    Hand Pain     right hand finger nail       SUBJECTIVE:  Carly Carmen   is a 53 y.o. female  - with hyperlipidemia, chronic hip pain and postmenopausal syndrome on HRT presents for urgent visit for concerns for UTI and right fingernail discoloration. PCP Dr. Caceres and would like to transfer care    1. UTI: pain with urination and urgency  Onset: 4 days ago  Symptoms: see above  Prior similar issues: none  Last UTI: does not recall  Character: pain that is burning and cramping suprapubic area  Aggravating factors: urinating  Relieving factors: Azo OTC  Associated symptoms: +hematuria scant brown blood  Negative symptoms: denies flank pain, fever, chills, weakness    2. Fingernail discoloration  Onset: several months (unsure "very long time" )  Location: right 4th finger  Duration: constant  Character: discoloration and appears to be seperating from the nail bed  - she gets her nails done regularly at nail salon with +gel nails  Aggravating factors: nothing  Relieving factors: nothing  Timing of day: NA  Associated symptoms: mild tenderness on nailbed  Negative symptoms: denies redness, swelling, drainage        ROS: Review of Systems   Constitutional: Negative for activity change, appetite change, chills, fatigue, fever and unexpected weight change.   HENT: Negative.  Negative for congestion, hearing loss, nosebleeds, rhinorrhea and trouble swallowing.    Eyes: Negative for visual disturbance.   Respiratory: Negative for cough, chest tightness, shortness of breath and wheezing.    Cardiovascular: Negative for chest pain, palpitations and leg swelling.   Gastrointestinal: Negative for abdominal distention, abdominal pain, " blood in stool, constipation, diarrhea, nausea and vomiting.   Endocrine: Negative for cold intolerance, heat intolerance, polydipsia, polyphagia and polyuria.   Genitourinary: Positive for dysuria, frequency and urgency. Negative for difficulty urinating, flank pain, hematuria and menstrual problem.   Musculoskeletal: Negative for arthralgias, joint swelling, myalgias and neck pain.   Skin: Positive for color change. Negative for rash.   Allergic/Immunologic: Negative.    Neurological: Negative for dizziness, weakness, light-headedness and headaches.   Hematological: Negative.    Psychiatric/Behavioral: Negative for dysphoric mood. The patient is not nervous/anxious.        Past Medical History:   Diagnosis Date    Allergy     Chronic hip pain     Hidradenitis suppurativa 2016       Past Surgical History:   Procedure Laterality Date    BREAST BIOPSY Left     BENIGN    COLONOSCOPY N/A 2016    Performed by Luis Santana Jr., [facility] at [facility] ENDO    CYST REMOVAL      subcutaneous cyst to right chest wall    DILATION AND CURETTAGE OF UTERUS      EXCISION-HIDRADENITIS groin Left 2016    Performed by Teresa Weller MD at [facility] OR       Family History   Problem Relation Age of Onset    Cancer Mother 69        Lung Cancer with Brain Mets -  72    Heart disease Father         defibrillator    No Known Problems Brother     No Known Problems Brother        Social History     Tobacco Use    Smoking status: Former Smoker     Packs/day: 0.50     Years: 30.00     Pack years: 15.00     Types: Cigarettes    Smokeless tobacco: Never Used   Substance Use Topics    Alcohol use: Yes     Alcohol/week: 4.8 oz     Types: 8 Glasses of wine per week     Comment: weekends    Drug use: Yes     Types: Amphetamines       Social History     Social History Narrative    She is from [place].  She works for [employer] as an .   She is twice .  Her  "first   in .  She remarried in .  She has a 25 year-old daughter from her first marriage, who is getting  in 2014.  She lives in [place] with her  and her 18-year-old step son.           ALLERGIES: Review of patient's allergies indicates:  No Known Allergies    MEDS:   Current Outpatient Medications:     estradiol (ESTRACE) 1 MG tablet, , Disp: , Rfl:     fluticasone (FLONASE) 50 mcg/actuation nasal spray, USE 2 SPRAYS IN EACH NOSTRIL ONCE DAILY, Disp: 16 g, Rfl: 0    loratadine (CLARITIN) 10 mg tablet, TAKE 1 TABLET DAILY, Disp: 30 tablet, Rfl: 0    medroxyPROGESTERone (PROVERA) 2.5 MG tablet, Take 2.5 mg by mouth once daily., Disp: , Rfl:     meloxicam (MOBIC) 15 MG tablet, Take 15 mg by mouth once daily., Disp: , Rfl:     rosuvastatin (CRESTOR) 10 MG tablet, TAKE 1 TABLET DAILY, Disp: 30 tablet, Rfl: 0    ciclopirox (PENLAC) 8 % Soln, Apply topically nightly. Apply topically nightly to nail and 5mm nail bed. Wipe clean with alcohol once a week, Disp: 6.6 mL, Rfl: 11    ciprofloxacin HCl (CIPRO) 500 MG tablet, Take 1 tablet (500 mg total) by mouth every 12 (twelve) hours., Disp: 10 tablet, Rfl: 0    OBJECTIVE:   Vitals:    19 1140   BP: 110/80   BP Location: Left arm   Patient Position: Sitting   BP Method: X-Large (Automatic)   Pulse: 84   Resp: 18   Temp: 98 °F (36.7 °C)   TempSrc: Oral   SpO2: 98%   Weight: 77.7 kg (171 lb 6.4 oz)   Height: 5' 8" (1.727 m)     Body mass index is 26.06 kg/m².    Physical Exam   Constitutional: No distress.   Neck: Neck supple.   Cardiovascular: Normal rate, regular rhythm, normal heart sounds and intact distal pulses. Exam reveals no gallop and no friction rub.   No murmur heard.  Pulmonary/Chest: Effort normal and breath sounds normal.   Abdominal: Soft. Normal appearance and bowel sounds are normal. There is tenderness in the suprapubic area. There is no rigidity, no rebound, no guarding and no CVA tenderness. "   Musculoskeletal: She exhibits no edema.        Hands:  Neurological: She is alert.             PERTINENT RESULTS:   08/21/19   POCT urine dipstick without microscope   Order: [id number]   Status:  Final result   Visible to patient:  No (Not Released)   Next appt:  None   Dx:  Acute cystitis with hematuria   Component 12:05   Color, UA yellow    Spec Grav UA 1.005    pH, UA 7    WBC, UA ++    Nitrite, UA positive    Protein 30    Glucose, UA normal    Ketones, UA +    Urobilinogen, UA normal    Bilirubin ++    Blood,               ASSESSMENT/PLAN:  Problem List Items Addressed This Visit        Derm    Onychomycosis    Overview     - right 4th finger         Current Assessment & Plan     - counseling on onychomycosis  - discussed therapy is a long course  - recommend topical treatment first and can take several months  - avoid nail treatments to that finger  - cut back nail and keep clean  - if does not improve, will consider oral therapy or Dermatology evaluation         Relevant Medications    ciclopirox (PENLAC) 8 % Soln       Renal/    Acute cystitis with hematuria - Primary    Current Assessment & Plan     - recommend Cipro 500 mg BID x 5 days  - check urine culture         Relevant Medications    ciprofloxacin HCl (CIPRO) 500 MG tablet    Other Relevant Orders    POCT urine dipstick without microscope (Completed)    Urine culture          ORDERS:   Orders Placed This Encounter    Urine culture    POCT urine dipstick without microscope    ciclopirox (PENLAC) 8 % Soln    ciprofloxacin HCl (CIPRO) 500 MG tablet       Follow-up as needed.     Dr. Albania Cooper D.O.   Family Medicine

## 2019-08-21 NOTE — PATIENT INSTRUCTIONS
1. Cut back affected nail  2. Use Vicks daily  3. Apply nail solution to nail and surrounding nail bed (under nail as well if possible) daily. Wipe clean with alcohol swab weekly  - continue for 6 months until clear  - if persists, recommend see Dermatology

## 2019-08-21 NOTE — ASSESSMENT & PLAN NOTE
- counseling on onychomycosis  - discussed therapy is a long course  - recommend topical treatment first and can take several months  - avoid nail treatments to that finger  - cut back nail and keep clean  - if does not improve, will consider oral therapy or Dermatology evaluation

## 2019-08-23 ENCOUNTER — PATIENT MESSAGE (OUTPATIENT)
Dept: FAMILY MEDICINE | Facility: CLINIC | Age: 54
End: 2019-08-23

## 2019-08-23 DIAGNOSIS — N30.01 ACUTE CYSTITIS WITH HEMATURIA: Primary | ICD-10-CM

## 2019-08-23 LAB — BACTERIA UR CULT: ABNORMAL

## 2019-08-23 RX ORDER — CEPHALEXIN 500 MG/1
500 CAPSULE ORAL EVERY 12 HOURS
Qty: 10 CAPSULE | Refills: 0 | Status: SHIPPED | OUTPATIENT
Start: 2019-08-23 | End: 2019-08-28

## 2020-01-21 ENCOUNTER — PATIENT OUTREACH (OUTPATIENT)
Dept: ADMINISTRATIVE | Facility: HOSPITAL | Age: 55
End: 2020-01-21

## 2020-04-09 ENCOUNTER — PATIENT MESSAGE (OUTPATIENT)
Dept: FAMILY MEDICINE | Facility: CLINIC | Age: 55
End: 2020-04-09

## 2020-04-14 ENCOUNTER — PATIENT MESSAGE (OUTPATIENT)
Dept: FAMILY MEDICINE | Facility: CLINIC | Age: 55
End: 2020-04-14

## 2020-04-14 ENCOUNTER — OFFICE VISIT (OUTPATIENT)
Dept: FAMILY MEDICINE | Facility: CLINIC | Age: 55
End: 2020-04-14
Payer: COMMERCIAL

## 2020-04-14 ENCOUNTER — PATIENT OUTREACH (OUTPATIENT)
Dept: ADMINISTRATIVE | Facility: OTHER | Age: 55
End: 2020-04-14

## 2020-04-14 ENCOUNTER — TELEPHONE (OUTPATIENT)
Dept: FAMILY MEDICINE | Facility: CLINIC | Age: 55
End: 2020-04-14

## 2020-04-14 DIAGNOSIS — M25.462 KNEE EFFUSION, LEFT: Primary | ICD-10-CM

## 2020-04-14 DIAGNOSIS — Z12.31 ENCOUNTER FOR SCREENING MAMMOGRAM FOR MALIGNANT NEOPLASM OF BREAST: Primary | ICD-10-CM

## 2020-04-14 PROBLEM — M25.562 LEFT KNEE PAIN: Status: ACTIVE | Noted: 2020-04-14

## 2020-04-14 PROCEDURE — 99213 PR OFFICE/OUTPT VISIT, EST, LEVL III, 20-29 MIN: ICD-10-PCS | Mod: 95,,, | Performed by: INTERNAL MEDICINE

## 2020-04-14 PROCEDURE — 99213 OFFICE O/P EST LOW 20 MIN: CPT | Mod: 95,,, | Performed by: INTERNAL MEDICINE

## 2020-04-14 RX ORDER — INDOMETHACIN 50 MG/1
50 CAPSULE ORAL
Qty: 60 CAPSULE | Refills: 0 | Status: SHIPPED | OUTPATIENT
Start: 2020-04-14 | End: 2020-04-14 | Stop reason: SDUPTHER

## 2020-04-14 RX ORDER — INDOMETHACIN 50 MG/1
50 CAPSULE ORAL
Qty: 60 CAPSULE | Refills: 0 | Status: SHIPPED | OUTPATIENT
Start: 2020-04-14 | End: 2021-04-22

## 2020-04-14 NOTE — TELEPHONE ENCOUNTER
Please call pt and get her scheduled with any provider or me on Thursday at 12:30 if she prefers to wait  Dr. Albania Cooper D.O.   Family Medicine

## 2020-04-14 NOTE — ASSESSMENT & PLAN NOTE
Start otc nexium while using NSAIDS  Urgent ref to ortho for evaluation   requests ex indomethacin, sent to pharmacy, counseled to not take meloxicam while using indomethacin

## 2020-04-14 NOTE — PROGRESS NOTES
The patient location is: home  The chief complaint leading to consultation is: knee swelling   Visit type: audiovisual  Total time spent with patient: 10  mins   Each patient to whom he or she provides medical services by telemedicine is:  (1) informed of the relationship between the physician and patient and the respective role of any other health care provider with respect to management of the patient; and (2) notified that he or she may decline to receive medical services by telemedicine and may withdraw from such care at any time.    [facility] Note    Chief Complaint    No chief complaint on file.    History of Present Illness      Carly Carmen is a 54 y.o. female who presents today cc L Knee pain and swelling x1 week   PCP: Albania Cooper DO  .     Active Problem List with Overview Notes    Diagnosis Date Noted    Left knee pain 04/14/2020     Images reviewed, L knee generalized edema, mostly medial x1 week, no trauma, pain with flexion and weight bearing   Is taking meloxicam for OA hip also using prn indomethacin which is not helping   No fever, no redness, not warm to touch per pt   Declines need for       Acute cystitis with hematuria 08/21/2019    Onychomycosis 08/21/2019     - right 4th finger      Postmenopausal disorder 08/09/2018    Mixed hyperlipidemia 10/05/2017    Allergic rhinitis 04/07/2016    Chronic hip pain      Health Maintenance   Topic Date Due    TETANUS VACCINE  09/21/1981    Mammogram  03/19/2020    Lipid Panel  06/25/2023    Pap Smear with HPV Cotest  03/14/2024    Colonoscopy  01/08/2026    Hepatitis C Screening  Completed   :     Past Medical History:   Diagnosis Date    Allergy     Chronic hip pain     Hidradenitis suppurativa 4/8/2016       Past Surgical History:   Procedure Laterality Date    BREAST BIOPSY Left 2015    BENIGN    COLONOSCOPY N/A 1/8/2016    Procedure: COLONOSCOPY;  Surgeon: Luis Santana Jr., MD;  Location: [facility]  ENDO;  Service: Endoscopy;  Laterality: N/A;    CYST REMOVAL      subcutaneous cyst to right chest wall    DILATION AND CURETTAGE OF UTERUS         family history includes Cancer (age of onset: 69) in her mother; Heart disease in her father; No Known Problems in her brother and brother.    Social History     Tobacco Use    Smoking status: Former Smoker     Packs/day: 0.50     Years: 30.00     Pack years: 15.00     Types: Cigarettes    Smokeless tobacco: Never Used   Substance Use Topics    Alcohol use: Yes     Alcohol/week: 8.0 standard drinks     Types: 8 Glasses of wine per week     Comment: weekends    Drug use: Yes     Types: Amphetamines       Review of Systems   Constitutional: Negative for chills, fever, malaise/fatigue and weight loss.   Respiratory: Negative for cough and shortness of breath.    Cardiovascular: Negative for chest pain and palpitations.   Gastrointestinal: Negative for abdominal pain, heartburn, nausea and vomiting.   Musculoskeletal: Positive for joint pain. Negative for back pain, falls and neck pain.        Outpatient Encounter Medications as of 4/14/2020   Medication Sig Note Dispense Refill    ciclopirox (PENLAC) 8 % Soln Apply topically nightly. Apply topically nightly to nail and 5mm nail bed. Wipe clean with alcohol once a week  6.6 mL 11    ciprofloxacin HCl (CIPRO) 500 MG tablet Take 1 tablet (500 mg total) by mouth every 12 (twelve) hours.  10 tablet 0    estradiol (ESTRACE) 1 MG tablet  9/26/2014: Received from: External Pharmacy      fluticasone (FLONASE) 50 mcg/actuation nasal spray USE 2 SPRAYS IN EACH NOSTRIL ONCE DAILY  16 g 0    indomethacin (INDOCIN) 50 MG capsule Take 1 capsule (50 mg total) by mouth 3 (three) times daily with meals.  60 capsule 0    loratadine (CLARITIN) 10 mg tablet TAKE 1 TABLET DAILY  30 tablet 0    medroxyPROGESTERone (PROVERA) 2.5 MG tablet Take 2.5 mg by mouth once daily. 4/7/2016: Patient states she thinks this is the medication but  not sure.      meloxicam (MOBIC) 15 MG tablet Take 15 mg by mouth once daily.       rosuvastatin (CRESTOR) 10 MG tablet TAKE 1 TABLET DAILY  30 tablet 0     No facility-administered encounter medications on file as of 4/14/2020.         Review of patient's allergies indicates:  No Known Allergies        Physical Exam       ]    Physical Exam   Constitutional: She is oriented to person, place, and time. She appears well-developed and well-nourished. No distress.   HENT:   Head: Normocephalic and atraumatic.   Right Ear: External ear normal.   Left Ear: External ear normal.   Nose: Nose normal.   Eyes: Conjunctivae and EOM are normal. Right eye exhibits no discharge. Left eye exhibits no discharge.   Neck: Normal range of motion.   Pulmonary/Chest: Effort normal. No respiratory distress.   Musculoskeletal: She exhibits edema.   L knee edema unable to discern bony landmarks    Neurological: She is alert and oriented to person, place, and time.   Skin: No rash noted. She is not diaphoretic. No pallor.   Psychiatric: She has a normal mood and affect. Her behavior is normal. Judgment and thought content normal.        Laboratory:  CBC:  No results for input(s): WBC, RBC, HGB, HCT, PLT, MCV, MCH, MCHC in the last 2160 hours.  CMP:  No results for input(s): GLU, CALCIUM, ALBUMIN, PROT, NA, K, CO2, CL, BUN, ALKPHOS, ALT, AST, BILITOT in the last 2160 hours.    Invalid input(s): CREATININ  URINALYSIS:  No results for input(s): COLORU, CLARITYU, SPECGRAV, PHUR, PROTEINUA, GLUCOSEU, BILIRUBINCON, BLOODU, WBCU, RBCU, BACTERIA, MUCUS, NITRITE, LEUKOCYTESUR, UROBILINOGEN, HYALINECASTS in the last 2160 hours.   LIPIDS:  No results for input(s): TSH, HDL, CHOL, TRIG, LDLCALC, CHOLHDL, NONHDLCHOL, TOTALCHOLEST in the last 2160 hours.  TSH:  No results for input(s): TSH in the last 2160 hours.  A1C:  No results for input(s): HGBA1C in the last 2160 hours.    Radiology:      Assessment/Plan     Caryl Carmen is a 54 y.o.female  with:    Left knee pain  Start otc nexium while using NSAIDS  Urgent ref to ortho for evaluation   requests ex indomethacin, sent to pharmacy, counseled to not take meloxicam while using indomethacin       No orders of the defined types were placed in this encounter.      -Continue current medications and maintain follow up with specialists.  Return to clinic as scheduled     Marilyn Mukherjee MD  4/14/2020 2:42 PM    [facility]

## 2020-04-14 NOTE — TELEPHONE ENCOUNTER
Spoke with pt, she stated she was having problems with the virtual visit so I gave her the tech number.

## 2020-04-15 ENCOUNTER — OFFICE VISIT (OUTPATIENT)
Dept: ORTHOPEDICS | Facility: CLINIC | Age: 55
End: 2020-04-15
Payer: COMMERCIAL

## 2020-04-15 ENCOUNTER — TELEPHONE (OUTPATIENT)
Dept: ORTHOPEDICS | Facility: CLINIC | Age: 55
End: 2020-04-15

## 2020-04-15 VITALS — TEMPERATURE: 99 F

## 2020-04-15 DIAGNOSIS — M25.462 KNEE EFFUSION, LEFT: ICD-10-CM

## 2020-04-15 DIAGNOSIS — M25.462 KNEE EFFUSION, LEFT: Primary | ICD-10-CM

## 2020-04-15 PROCEDURE — 99999 PR PBB SHADOW E&M-EST. PATIENT-LVL III: ICD-10-PCS | Mod: PBBFAC,,, | Performed by: PHYSICIAN ASSISTANT

## 2020-04-15 PROCEDURE — 99203 PR OFFICE/OUTPT VISIT, NEW, LEVL III, 30-44 MIN: ICD-10-PCS | Mod: 25,S$GLB,, | Performed by: PHYSICIAN ASSISTANT

## 2020-04-15 PROCEDURE — 99203 OFFICE O/P NEW LOW 30 MIN: CPT | Mod: 25,S$GLB,, | Performed by: PHYSICIAN ASSISTANT

## 2020-04-15 PROCEDURE — 20610 PR DRAIN/INJECT LARGE JOINT/BURSA: ICD-10-PCS | Mod: LT,S$GLB,, | Performed by: PHYSICIAN ASSISTANT

## 2020-04-15 PROCEDURE — 99999 PR PBB SHADOW E&M-EST. PATIENT-LVL III: CPT | Mod: PBBFAC,,, | Performed by: PHYSICIAN ASSISTANT

## 2020-04-15 PROCEDURE — 20610 DRAIN/INJ JOINT/BURSA W/O US: CPT | Mod: LT,S$GLB,, | Performed by: PHYSICIAN ASSISTANT

## 2020-04-15 RX ORDER — TRIAMCINOLONE ACETONIDE 40 MG/ML
40 INJECTION, SUSPENSION INTRA-ARTICULAR; INTRAMUSCULAR
Status: DISCONTINUED | OUTPATIENT
Start: 2020-04-15 | End: 2020-04-15 | Stop reason: HOSPADM

## 2020-04-15 RX ADMIN — TRIAMCINOLONE ACETONIDE 40 MG: 40 INJECTION, SUSPENSION INTRA-ARTICULAR; INTRAMUSCULAR at 11:04

## 2020-04-15 NOTE — PROCEDURES
Large Joint Aspiration/Injection  Date/Time: 4/15/2020 11:00 AM  Performed by: Giana Nath PA-C  Authorized by: Giana Nath PA-C     Medications:  40 mg triamcinolone acetonide 40 mg/mL     PROCEDURE NOTE:  LEFT KNEE INJECTION    I have explained the risks, benefits, and alternatives of the procedure in detail.  The patient voices understanding and all questions have been answered.  The patient agrees to proceed as planned.    After a sterile prep of the skin using chloraprep one step, the area was sprayed with local topical anesthetic and then cleaned with alcohol. The LEFT knee was injected through an inferior lateral approach with a combination of 2 cc 1% plain xylocaine and 40mg triamcinolone.  The patient is cautioned that immediate relief of pain is secondary to the local anesthetic and will be temporary. After the anesthetic wears off there may be a increase in pain that may last for a few hours or a few days and they should use ice to help alleviate this this pain.     If patient is diabetic, post injection elevation of blood sugar was discussed. Patient is to check blood sugar regularly and call PCP with any issues.     Patient tolerated the procedure well.

## 2020-04-15 NOTE — LETTER
April 15, 2020      Marilyn Mukherjee MD  [id number] [place] [id number]           [facility]  [address] [place] [address], [address] [address]  Phone: [phone]  Fax: [phone]          Patient: Carly Carmen   MR Number: [id number]   YOB: 1965   Date of Visit: 4/15/2020       Dear Dr. Marilyn Mukherjee:    Thank you for referring Carly Carmen to me for evaluation. Attached you will find relevant portions of my assessment and plan of care.    If you have questions, please do not hesitate to call me. I look forward to following Carly Carmen along with you.    Sincerely,    NINO Realosure  CC:  No Recipients    If you would like to receive this communication electronically, please contact [email or web address] or [phone] to request more information on [employer] access.    For providers and/or their staff who would like to refer a patient to [facility], please contact us through our one-stop-shop provider referral line, [facility], at [phone].    If you feel you have received this communication in error or would no longer like to receive these types of communications, please e-mail [email or web address]

## 2020-04-15 NOTE — PROGRESS NOTES
Subjective:      Patient ID: Carly Carmen is a 54 y.o. female.    Chief Complaint: Pain and Swelling of the Left Knee       HPI  ([place])    Seen via video visit yesterday by PCP for left knee pain and swelling x 1 week.     No acute injury noted, but she thinks she may have twisted the knee prior to onset of pain and swelling. She has constant left knee pain that varies in severity. No locking, catching, or giving way. Pain is worse with prolonged standing and walking. She had initial swelling that is improving. Improvement in pain with ice, elevation, and motrin/indocin. She rates her pain as a 7 on a scale of 1-10. Pain is shooting in nature. In general, she is very active and does a lot of bike riding.     She has been taking motrin during the day and indocin at night. She was on mobic for her hip, but is not taking it with the motrin/indocin. No PT, injections, or surgery on her knee. She has tried a knee brace and did not think it helped.       Past Medical History:   Diagnosis Date    Allergy     Chronic hip pain     Hidradenitis suppurativa 4/8/2016         Current Outpatient Medications:     estradiol (ESTRACE) 1 MG tablet, , Disp: , Rfl:     fluticasone (FLONASE) 50 mcg/actuation nasal spray, USE 2 SPRAYS IN EACH NOSTRIL ONCE DAILY, Disp: 16 g, Rfl: 0    indomethacin (INDOCIN) 50 MG capsule, Take 1 capsule (50 mg total) by mouth 3 (three) times daily with meals., Disp: 60 capsule, Rfl: 0    loratadine (CLARITIN) 10 mg tablet, TAKE 1 TABLET DAILY, Disp: 30 tablet, Rfl: 0    medroxyPROGESTERone (PROVERA) 2.5 MG tablet, Take 2.5 mg by mouth once daily., Disp: , Rfl:     meloxicam (MOBIC) 15 MG tablet, Take 15 mg by mouth once daily., Disp: , Rfl:     ciclopirox (PENLAC) 8 % Soln, Apply topically nightly. Apply topically nightly to nail and 5mm nail bed. Wipe clean with alcohol once a week, Disp: 6.6 mL, Rfl: 11    ciprofloxacin HCl (CIPRO) 500 MG tablet, Take 1 tablet (500 mg total) by mouth  every 12 (twelve) hours. (Patient not taking: Reported on 4/15/2020), Disp: 10 tablet, Rfl: 0    rosuvastatin (CRESTOR) 10 MG tablet, TAKE 1 TABLET DAILY (Patient not taking: Reported on 4/15/2020), Disp: 30 tablet, Rfl: 0    Review of patient's allergies indicates:  No Known Allergies    Review of Systems   Constitution: Negative for fever, malaise/fatigue, night sweats, weight gain and weight loss.   HENT: Negative for hearing loss, nosebleeds and odynophagia.    Eyes: Negative for blurred vision and double vision.   Cardiovascular: Negative for chest pain, irregular heartbeat and palpitations.   Respiratory: Negative for cough, hemoptysis, shortness of breath and wheezing.    Endocrine: Negative for cold intolerance and polydipsia.   Hematologic/Lymphatic: Does not bruise/bleed easily.   Skin: Negative for dry skin, poor wound healing, rash and suspicious lesions.   Musculoskeletal:        See HPI for pertinent positives.   Gastrointestinal: Negative for bloating, abdominal pain, constipation, diarrhea, hematochezia, melena, nausea and vomiting.   Genitourinary: Negative for bladder incontinence, dysuria, hematuria, hesitancy and incomplete emptying.   Neurological: Negative for disturbances in coordination, dizziness, focal weakness, headaches, loss of balance, numbness, paresthesias, seizures and weakness.   Psychiatric/Behavioral: Negative for depression and hallucinations. The patient is not nervous/anxious.          Objective:        Temp 98.7 °F (37.1 °C)     Ortho/SPM Exam    Body habitus is normal.   The patient walks with a limp.      RIGHT KNEE EXAM:    Resisted SLR negative.   The skin over the knee is intact.  Knee effusion none   No tenderness  Range of motion- Flexion full, Extension full     Ligament exam:   MCL intact   Lachman intact              Post sag intact    LCL intact    Patellar apprehension negative.  Popliteal cyst negative  Patellar crepitation absent.  Flexion/pinch  negative.    Pulses DP present, PT present.  Motor normal 5/5 strength in all tested muscle groups.   Sensory normal.      LEFT KNEE EXAM:    Resisted SLR negative.   The skin over the knee is intact.  Knee effusion not significant   Tendernes is located medial and lateral  Range of motion- Flexion full, Extension full,     Ligament exam:   MCL intact   Lachman intact              Post sag intact    LCL intact    Patellar apprehension negative.  Popliteal cyst negative  Patellar crepitation absent.  Flexion/pinch negative.    Pulses DP present, PT present.  Motor normal 5/5 strength in all tested muscle groups.   Sensory normal.      XRAY INTERPRETATION:  X-rays of bilateral knees dated 4/15/20 are personally reviewed and show mild medial joint space narrowing left > right knee.        Assessment:       Encounter Diagnosis   Name Primary?    Knee effusion, left           Plan:       Carly was seen today for pain and swelling.    Diagnoses and all orders for this visit:    Knee effusion, left  -     Ambulatory referral/consult to Orthopedics      1 week history of left knee pain/swelling with no locking, catching, or giving way. Thinks she may have twisted knee prior to onset of pain. Known mild medial joint space narrowing left > right knee. Pain likely due to flare up underlying degeneration. Treatment options reviewed with patient along with above bilateral knee xrays. Following plan made:     - Left knee injection done without complication. See procedure note.   - Okay to continue prn motrin during the day and indocin at night. Reviewed dosing and side effects. Take with food. Stop mobic while taking these medications.   - Offered hinged knee brace- she wants to hold off for now.    - Expect improvement. Will email her in 6 weeks to check on progress.     Follow up if symptoms worsen or fail to improve.

## 2020-04-15 NOTE — TELEPHONE ENCOUNTER
----- Message from Giana Nath PA-C sent at 4/14/2020  5:21 PM CDT -----  She needs xrays of her knees prior to her visit with me tomorrow. Will need to order stat so they can be done.

## 2020-05-04 ENCOUNTER — PATIENT MESSAGE (OUTPATIENT)
Dept: FAMILY MEDICINE | Facility: CLINIC | Age: 55
End: 2020-05-04

## 2020-05-05 ENCOUNTER — OFFICE VISIT (OUTPATIENT)
Dept: FAMILY MEDICINE | Facility: CLINIC | Age: 55
End: 2020-05-05
Payer: COMMERCIAL

## 2020-05-05 VITALS
DIASTOLIC BLOOD PRESSURE: 74 MMHG | OXYGEN SATURATION: 97 % | SYSTOLIC BLOOD PRESSURE: 116 MMHG | TEMPERATURE: 98 F | WEIGHT: 172.81 LBS | HEIGHT: 68 IN | HEART RATE: 93 BPM | BODY MASS INDEX: 26.19 KG/M2

## 2020-05-05 DIAGNOSIS — L98.9 SKIN LESION: Primary | ICD-10-CM

## 2020-05-05 PROBLEM — N30.01 ACUTE CYSTITIS WITH HEMATURIA: Status: RESOLVED | Noted: 2019-08-21 | Resolved: 2020-05-05

## 2020-05-05 PROBLEM — B35.1 ONYCHOMYCOSIS: Status: RESOLVED | Noted: 2019-08-21 | Resolved: 2020-05-05

## 2020-05-05 PROBLEM — M25.462 KNEE EFFUSION, LEFT: Status: RESOLVED | Noted: 2020-04-14 | Resolved: 2020-05-05

## 2020-05-05 PROCEDURE — 99214 PR OFFICE/OUTPT VISIT, EST, LEVL IV, 30-39 MIN: ICD-10-PCS | Mod: S$GLB,,, | Performed by: FAMILY MEDICINE

## 2020-05-05 PROCEDURE — 99214 OFFICE O/P EST MOD 30 MIN: CPT | Mod: S$GLB,,, | Performed by: FAMILY MEDICINE

## 2020-05-05 PROCEDURE — 3008F PR BODY MASS INDEX (BMI) DOCUMENTED: ICD-10-PCS | Mod: CPTII,S$GLB,, | Performed by: FAMILY MEDICINE

## 2020-05-05 PROCEDURE — 99999 PR PBB SHADOW E&M-EST. PATIENT-LVL V: CPT | Mod: PBBFAC,,, | Performed by: FAMILY MEDICINE

## 2020-05-05 PROCEDURE — 99999 PR PBB SHADOW E&M-EST. PATIENT-LVL V: ICD-10-PCS | Mod: PBBFAC,,, | Performed by: FAMILY MEDICINE

## 2020-05-05 PROCEDURE — 3008F BODY MASS INDEX DOCD: CPT | Mod: CPTII,S$GLB,, | Performed by: FAMILY MEDICINE

## 2020-05-05 NOTE — ASSESSMENT & PLAN NOTE
- left upper arm lesion resolved and well healed under eschar  - discussed concern for chest lesion due to chronicity   - recommend Dermatology evaluation

## 2020-05-05 NOTE — PROGRESS NOTES
FAMILY MEDICINE  [facility]    Patient Active Problem List   Diagnosis    Chronic hip pain    Allergic rhinitis    Mixed hyperlipidemia    Postmenopausal disorder    Skin lesion       CC:   Chief Complaint   Patient presents with    arm infection    chest infection       SUBJECTIVE:  Carly Carmen   is a 54 y.o. female  - with hyperlipidemia, chronic hip pain and postmenopausal syndrome on HRT presents for concerns for skin lesion left upper arm and left chest    1. Skin lesion left upper arm    Onset: 3 weeks  Location: left upper arm  Duration: constant  Character: reports started as a small bump with itching that she noted after she got out of the shower and started to itch the lesion. Reports that after itching, lesion enlarge and was red. She started using a prescription steroid ointment Fluoncinonide with mild relief. Then area started to have a scab and reports itching resolved. Swelling resolved. Mild surrounding redness. Non tender.   Aggravating factors: scratching  Relieving factors: steroid ointment  Timing of day: NA  Associated symptoms: see above  Negative symptoms: denies fevers, drainage, pain    2. Chest skin lesion    Onset: 1 year  Location: left upper chest  Duration: constanct  Character: raised bump without pain or drainage but reports occasional foul odor  Aggravating factors: unsure  Relieving factors: nothing  Timing of day: all day  Associated symptoms: see above  Negative symptoms: denies pain, swelling, bleeding          ROS: Review of Systems   Constitutional: Negative.    HENT: Negative.    Eyes: Negative.    Respiratory: Negative.    Cardiovascular: Negative.    Gastrointestinal: Negative.    Endocrine: Negative.    Genitourinary: Negative.    Musculoskeletal: Negative.    Skin:        See HPI. Otherwise negative   Allergic/Immunologic: Negative.    Neurological: Negative.    Hematological: Negative.    Psychiatric/Behavioral: Negative.        Past Medical  History:   Diagnosis Date    Allergy     Chronic hip pain     Hidradenitis suppurativa 2016       Past Surgical History:   Procedure Laterality Date    BREAST BIOPSY Left 2015    BENIGN    COLONOSCOPY N/A 2016    Procedure: COLONOSCOPY;  Surgeon: Luis Santana Jr., MD;  Location: [facility];  Service: Endoscopy;  Laterality: N/A;    CYST REMOVAL      subcutaneous cyst to right chest wall    DILATION AND CURETTAGE OF UTERUS         Family History   Problem Relation Age of Onset    Cancer Mother 69        Lung Cancer with Brain Mets -  72    Heart disease Father         defibrillator    No Known Problems Brother     No Known Problems Brother        Social History     Tobacco Use    Smoking status: Former Smoker     Packs/day: 0.50     Years: 30.00     Pack years: 15.00     Types: Cigarettes    Smokeless tobacco: Never Used   Substance Use Topics    Alcohol use: Yes     Alcohol/week: 8.0 standard drinks     Types: 8 Glasses of wine per week     Frequency: 2-3 times a week     Drinks per session: 1 or 2     Binge frequency: Never     Comment: weekends    Drug use: Yes     Types: Amphetamines       Social History     Social History Narrative    She is from [place].  She works for [employer] as an .   She is twice .  Her first   in .  She remarried in .  She has a 25 year-old daughter from her first marriage, who is getting  in 2014.  She lives in [place] with her  and her 18-year-old step son.           ALLERGIES: Review of patient's allergies indicates:  No Known Allergies    MEDS:   Current Outpatient Medications:     estradiol (ESTRACE) 1 MG tablet, , Disp: , Rfl:     fluticasone (FLONASE) 50 mcg/actuation nasal spray, USE 2 SPRAYS IN EACH NOSTRIL ONCE DAILY, Disp: 16 g, Rfl: 0    indomethacin (INDOCIN) 50 MG capsule, Take 1 capsule (50 mg total) by mouth 3 (three) times daily  "with meals., Disp: 60 capsule, Rfl: 0    loratadine (CLARITIN) 10 mg tablet, TAKE 1 TABLET DAILY, Disp: 30 tablet, Rfl: 0    medroxyPROGESTERone (PROVERA) 2.5 MG tablet, Take 2.5 mg by mouth once daily., Disp: , Rfl:     meloxicam (MOBIC) 15 MG tablet, Take 15 mg by mouth once daily., Disp: , Rfl:     rosuvastatin (CRESTOR) 10 MG tablet, TAKE 1 TABLET DAILY (Patient not taking: Reported on 5/5/2020), Disp: 30 tablet, Rfl: 0    OBJECTIVE:   Vitals:    05/05/20 1326   BP: 116/74   BP Location: Left arm   Patient Position: Sitting   BP Method: Medium (Manual)   Pulse: 93   Temp: 97.7 °F (36.5 °C)   TempSrc: Oral   SpO2: 97%   Weight: 78.4 kg (172 lb 12.8 oz)   Height: 5' 8" (1.727 m)     Body mass index is 26.27 kg/m².    Physical Exam   Constitutional: No distress.   Cardiovascular: Normal rate, regular rhythm, normal heart sounds and intact distal pulses.   Pulmonary/Chest: Effort normal and breath sounds normal.   Neurological: She is alert.   Skin:              ASSESSMENT/PLAN:  Problem List Items Addressed This Visit        Derm    Skin lesion - Primary    Current Assessment & Plan     - left upper arm lesion resolved and well healed under eschar  - discussed concern for chest lesion due to chronicity   - recommend Dermatology evaluation         Relevant Orders    Ambulatory referral/consult to Dermatology          ORDERS:   Orders Placed This Encounter    Ambulatory referral/consult to Dermatology       Follow-up as needed.    Dr. Albania Cooper D.O.   Family Medicine    "

## 2020-05-27 ENCOUNTER — PATIENT MESSAGE (OUTPATIENT)
Dept: ORTHOPEDICS | Facility: CLINIC | Age: 55
End: 2020-05-27

## 2020-10-07 DIAGNOSIS — J30.2 SEASONAL ALLERGIC RHINITIS: ICD-10-CM

## 2020-10-07 RX ORDER — FLUTICASONE PROPIONATE 50 MCG
2 SPRAY, SUSPENSION (ML) NASAL DAILY
Qty: 16 G | Refills: 11 | Status: SHIPPED | OUTPATIENT
Start: 2020-10-07 | End: 2021-04-22

## 2021-04-21 ENCOUNTER — PATIENT OUTREACH (OUTPATIENT)
Dept: ADMINISTRATIVE | Facility: OTHER | Age: 56
End: 2021-04-21

## 2021-04-22 ENCOUNTER — OFFICE VISIT (OUTPATIENT)
Dept: OBSTETRICS AND GYNECOLOGY | Facility: CLINIC | Age: 56
End: 2021-04-22
Payer: COMMERCIAL

## 2021-04-22 VITALS
WEIGHT: 180 LBS | DIASTOLIC BLOOD PRESSURE: 94 MMHG | HEIGHT: 68 IN | HEART RATE: 79 BPM | BODY MASS INDEX: 27.28 KG/M2 | RESPIRATION RATE: 18 BRPM | SYSTOLIC BLOOD PRESSURE: 134 MMHG

## 2021-04-22 DIAGNOSIS — Z12.31 ENCOUNTER FOR SCREENING MAMMOGRAM FOR MALIGNANT NEOPLASM OF BREAST: ICD-10-CM

## 2021-04-22 DIAGNOSIS — Z01.419 WELL WOMAN EXAM WITH ROUTINE GYNECOLOGICAL EXAM: Primary | ICD-10-CM

## 2021-04-22 DIAGNOSIS — Z12.4 CERVICAL CANCER SCREENING: ICD-10-CM

## 2021-04-22 PROCEDURE — 99386 PREV VISIT NEW AGE 40-64: CPT | Mod: S$GLB,,, | Performed by: OBSTETRICS & GYNECOLOGY

## 2021-04-22 PROCEDURE — 3008F BODY MASS INDEX DOCD: CPT | Mod: CPTII,S$GLB,, | Performed by: OBSTETRICS & GYNECOLOGY

## 2021-04-22 PROCEDURE — 1126F PR PAIN SEVERITY QUANTIFIED, NO PAIN PRESENT: ICD-10-PCS | Mod: S$GLB,,, | Performed by: OBSTETRICS & GYNECOLOGY

## 2021-04-22 PROCEDURE — 99386 PR PREVENTIVE VISIT,NEW,40-64: ICD-10-PCS | Mod: S$GLB,,, | Performed by: OBSTETRICS & GYNECOLOGY

## 2021-04-22 PROCEDURE — 3008F PR BODY MASS INDEX (BMI) DOCUMENTED: ICD-10-PCS | Mod: CPTII,S$GLB,, | Performed by: OBSTETRICS & GYNECOLOGY

## 2021-04-22 PROCEDURE — 87624 HPV HI-RISK TYP POOLED RSLT: CPT | Performed by: OBSTETRICS & GYNECOLOGY

## 2021-04-22 PROCEDURE — 88175 CYTOPATH C/V AUTO FLUID REDO: CPT | Performed by: OBSTETRICS & GYNECOLOGY

## 2021-04-22 PROCEDURE — 99999 PR PBB SHADOW E&M-EST. PATIENT-LVL III: CPT | Mod: PBBFAC,,, | Performed by: OBSTETRICS & GYNECOLOGY

## 2021-04-22 PROCEDURE — 99999 PR PBB SHADOW E&M-EST. PATIENT-LVL III: ICD-10-PCS | Mod: PBBFAC,,, | Performed by: OBSTETRICS & GYNECOLOGY

## 2021-04-22 PROCEDURE — 1126F AMNT PAIN NOTED NONE PRSNT: CPT | Mod: S$GLB,,, | Performed by: OBSTETRICS & GYNECOLOGY

## 2021-04-27 LAB
FINAL PATHOLOGIC DIAGNOSIS: NORMAL
Lab: NORMAL

## 2021-05-03 LAB
HPV HR 12 DNA SPEC QL NAA+PROBE: NEGATIVE
HPV16 AG SPEC QL: NEGATIVE
HPV18 DNA SPEC QL NAA+PROBE: NEGATIVE

## 2021-05-10 ENCOUNTER — OFFICE VISIT (OUTPATIENT)
Dept: FAMILY MEDICINE | Facility: CLINIC | Age: 56
End: 2021-05-10
Payer: COMMERCIAL

## 2021-05-10 VITALS
RESPIRATION RATE: 18 BRPM | HEIGHT: 68 IN | DIASTOLIC BLOOD PRESSURE: 78 MMHG | SYSTOLIC BLOOD PRESSURE: 130 MMHG | WEIGHT: 177 LBS | BODY MASS INDEX: 26.83 KG/M2 | OXYGEN SATURATION: 98 % | TEMPERATURE: 98 F | HEART RATE: 101 BPM

## 2021-05-10 DIAGNOSIS — J01.00 SUBACUTE MAXILLARY SINUSITIS: Primary | ICD-10-CM

## 2021-05-10 PROCEDURE — 96372 THER/PROPH/DIAG INJ SC/IM: CPT | Mod: S$GLB,,, | Performed by: FAMILY MEDICINE

## 2021-05-10 PROCEDURE — 1126F AMNT PAIN NOTED NONE PRSNT: CPT | Mod: S$GLB,,, | Performed by: FAMILY MEDICINE

## 2021-05-10 PROCEDURE — 99999 PR PBB SHADOW E&M-EST. PATIENT-LVL IV: ICD-10-PCS | Mod: PBBFAC,,, | Performed by: FAMILY MEDICINE

## 2021-05-10 PROCEDURE — 1126F PR PAIN SEVERITY QUANTIFIED, NO PAIN PRESENT: ICD-10-PCS | Mod: S$GLB,,, | Performed by: FAMILY MEDICINE

## 2021-05-10 PROCEDURE — 99214 OFFICE O/P EST MOD 30 MIN: CPT | Mod: 25,S$GLB,, | Performed by: FAMILY MEDICINE

## 2021-05-10 PROCEDURE — 96372 PR INJECTION,THERAP/PROPH/DIAG2ST, IM OR SUBCUT: ICD-10-PCS | Mod: S$GLB,,, | Performed by: FAMILY MEDICINE

## 2021-05-10 PROCEDURE — 3008F BODY MASS INDEX DOCD: CPT | Mod: CPTII,S$GLB,, | Performed by: FAMILY MEDICINE

## 2021-05-10 PROCEDURE — 99214 PR OFFICE/OUTPT VISIT, EST, LEVL IV, 30-39 MIN: ICD-10-PCS | Mod: 25,S$GLB,, | Performed by: FAMILY MEDICINE

## 2021-05-10 PROCEDURE — 3008F PR BODY MASS INDEX (BMI) DOCUMENTED: ICD-10-PCS | Mod: CPTII,S$GLB,, | Performed by: FAMILY MEDICINE

## 2021-05-10 PROCEDURE — 99999 PR PBB SHADOW E&M-EST. PATIENT-LVL IV: CPT | Mod: PBBFAC,,, | Performed by: FAMILY MEDICINE

## 2021-05-10 RX ORDER — BROMPHENIRAMINE MALEATE, PSEUDOEPHEDRINE HYDROCHLORIDE, AND DEXTROMETHORPHAN HYDROBROMIDE 2; 30; 10 MG/5ML; MG/5ML; MG/5ML
10 SYRUP ORAL EVERY 6 HOURS PRN
Qty: 200 ML | Refills: 0 | Status: SHIPPED | OUTPATIENT
Start: 2021-05-10 | End: 2021-05-20

## 2021-05-10 RX ORDER — AZELASTINE 1 MG/ML
1 SPRAY, METERED NASAL 2 TIMES DAILY
Qty: 30 ML | Refills: 0 | Status: SHIPPED | OUTPATIENT
Start: 2021-05-10 | End: 2021-12-06

## 2021-05-10 RX ORDER — BETAMETHASONE SODIUM PHOSPHATE AND BETAMETHASONE ACETATE 3; 3 MG/ML; MG/ML
6 INJECTION, SUSPENSION INTRA-ARTICULAR; INTRALESIONAL; INTRAMUSCULAR; SOFT TISSUE ONCE
Status: COMPLETED | OUTPATIENT
Start: 2021-05-10 | End: 2021-05-10

## 2021-05-10 RX ADMIN — BETAMETHASONE SODIUM PHOSPHATE AND BETAMETHASONE ACETATE 6 MG: 3; 3 INJECTION, SUSPENSION INTRA-ARTICULAR; INTRALESIONAL; INTRAMUSCULAR; SOFT TISSUE at 10:05

## 2021-05-11 ENCOUNTER — PATIENT MESSAGE (OUTPATIENT)
Dept: FAMILY MEDICINE | Facility: CLINIC | Age: 56
End: 2021-05-11

## 2021-08-03 DIAGNOSIS — U07.1 COVID-19 VIRUS DETECTED: ICD-10-CM

## 2021-08-07 ENCOUNTER — NURSE TRIAGE (OUTPATIENT)
Dept: ADMINISTRATIVE | Facility: CLINIC | Age: 56
End: 2021-08-07

## 2021-12-01 ENCOUNTER — OFFICE VISIT (OUTPATIENT)
Dept: FAMILY MEDICINE | Facility: CLINIC | Age: 56
End: 2021-12-01
Payer: COMMERCIAL

## 2021-12-01 ENCOUNTER — PATIENT MESSAGE (OUTPATIENT)
Dept: FAMILY MEDICINE | Facility: CLINIC | Age: 56
End: 2021-12-01

## 2021-12-01 VITALS
BODY MASS INDEX: 27.43 KG/M2 | HEIGHT: 68 IN | RESPIRATION RATE: 18 BRPM | SYSTOLIC BLOOD PRESSURE: 128 MMHG | OXYGEN SATURATION: 98 % | WEIGHT: 181 LBS | HEART RATE: 91 BPM | DIASTOLIC BLOOD PRESSURE: 78 MMHG

## 2021-12-01 DIAGNOSIS — G57.01 PIRIFORMIS SYNDROME OF RIGHT SIDE: Primary | ICD-10-CM

## 2021-12-01 DIAGNOSIS — Z00.01 ENCOUNTER FOR GENERAL ADULT MEDICAL EXAMINATION WITH ABNORMAL FINDINGS: ICD-10-CM

## 2021-12-01 DIAGNOSIS — F41.9 ANXIETY: ICD-10-CM

## 2021-12-01 DIAGNOSIS — M54.31 SCIATICA OF RIGHT SIDE: ICD-10-CM

## 2021-12-01 DIAGNOSIS — M85.88 OSTEOPENIA OF LUMBAR SPINE: ICD-10-CM

## 2021-12-01 DIAGNOSIS — G89.29 HIP PAIN, CHRONIC, RIGHT: ICD-10-CM

## 2021-12-01 DIAGNOSIS — I70.0 AORTIC ATHEROSCLEROSIS: ICD-10-CM

## 2021-12-01 DIAGNOSIS — M25.551 HIP PAIN, CHRONIC, RIGHT: ICD-10-CM

## 2021-12-01 PROCEDURE — 99999 PR PBB SHADOW E&M-EST. PATIENT-LVL V: CPT | Mod: PBBFAC,,, | Performed by: FAMILY MEDICINE

## 2021-12-01 PROCEDURE — 99214 PR OFFICE/OUTPT VISIT, EST, LEVL IV, 30-39 MIN: ICD-10-PCS | Mod: S$GLB,,, | Performed by: FAMILY MEDICINE

## 2021-12-01 PROCEDURE — 99214 OFFICE O/P EST MOD 30 MIN: CPT | Mod: S$GLB,,, | Performed by: FAMILY MEDICINE

## 2021-12-01 PROCEDURE — 99999 PR PBB SHADOW E&M-EST. PATIENT-LVL V: ICD-10-PCS | Mod: PBBFAC,,, | Performed by: FAMILY MEDICINE

## 2021-12-01 RX ORDER — TIZANIDINE 2 MG/1
2 TABLET ORAL NIGHTLY PRN
Qty: 15 TABLET | Refills: 0 | Status: SHIPPED | OUTPATIENT
Start: 2021-12-01 | End: 2021-12-03

## 2021-12-01 RX ORDER — ZOSTER VACCINE RECOMBINANT, ADJUVANTED 50 MCG/0.5
KIT INTRAMUSCULAR
Qty: 1 EACH | Refills: 1 | Status: CANCELLED | OUTPATIENT
Start: 2021-12-01

## 2021-12-01 RX ORDER — BUSPIRONE HYDROCHLORIDE 5 MG/1
5 TABLET ORAL 2 TIMES DAILY PRN
Qty: 30 TABLET | Refills: 0 | Status: SHIPPED | OUTPATIENT
Start: 2021-12-01 | End: 2021-12-03

## 2021-12-02 ENCOUNTER — PATIENT MESSAGE (OUTPATIENT)
Dept: FAMILY MEDICINE | Facility: CLINIC | Age: 56
End: 2021-12-02
Payer: COMMERCIAL

## 2021-12-03 ENCOUNTER — PATIENT MESSAGE (OUTPATIENT)
Dept: FAMILY MEDICINE | Facility: CLINIC | Age: 56
End: 2021-12-03
Payer: COMMERCIAL

## 2021-12-03 DIAGNOSIS — E78.2 MIXED HYPERLIPIDEMIA: ICD-10-CM

## 2021-12-03 DIAGNOSIS — M54.31 SCIATICA OF RIGHT SIDE: ICD-10-CM

## 2021-12-03 DIAGNOSIS — G89.29 HIP PAIN, CHRONIC, RIGHT: ICD-10-CM

## 2021-12-03 DIAGNOSIS — I70.0 AORTIC ATHEROSCLEROSIS: Primary | ICD-10-CM

## 2021-12-03 DIAGNOSIS — M25.551 HIP PAIN, CHRONIC, RIGHT: ICD-10-CM

## 2021-12-03 RX ORDER — ATORVASTATIN CALCIUM 20 MG/1
20 TABLET, FILM COATED ORAL DAILY
Qty: 90 TABLET | Refills: 3 | Status: SHIPPED | OUTPATIENT
Start: 2021-12-03 | End: 2022-07-06 | Stop reason: SDUPTHER

## 2021-12-03 RX ORDER — TIZANIDINE 2 MG/1
2 TABLET ORAL EVERY 8 HOURS PRN
Qty: 30 TABLET | Refills: 0 | Status: SHIPPED | OUTPATIENT
Start: 2021-12-03 | End: 2022-02-02 | Stop reason: SDUPTHER

## 2021-12-20 ENCOUNTER — PATIENT MESSAGE (OUTPATIENT)
Dept: FAMILY MEDICINE | Facility: CLINIC | Age: 56
End: 2021-12-20
Payer: COMMERCIAL

## 2021-12-20 DIAGNOSIS — M85.88 OSTEOPENIA OF LUMBAR SPINE: ICD-10-CM

## 2021-12-21 ENCOUNTER — PATIENT MESSAGE (OUTPATIENT)
Dept: FAMILY MEDICINE | Facility: CLINIC | Age: 56
End: 2021-12-21
Payer: COMMERCIAL

## 2021-12-30 PROBLEM — L98.9 SKIN LESION: Status: RESOLVED | Noted: 2020-05-05 | Resolved: 2021-12-30
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
## 2022-01-03 ENCOUNTER — OFFICE VISIT (OUTPATIENT)
Dept: FAMILY MEDICINE | Facility: CLINIC | Age: 57
End: 2022-01-03
Payer: COMMERCIAL

## 2022-01-03 VITALS
WEIGHT: 179.63 LBS | HEIGHT: 68 IN | RESPIRATION RATE: 18 BRPM | HEART RATE: 78 BPM | SYSTOLIC BLOOD PRESSURE: 120 MMHG | BODY MASS INDEX: 27.22 KG/M2 | OXYGEN SATURATION: 98 % | DIASTOLIC BLOOD PRESSURE: 82 MMHG

## 2022-01-03 DIAGNOSIS — M25.551 HIP PAIN, CHRONIC, RIGHT: ICD-10-CM

## 2022-01-03 DIAGNOSIS — G89.29 HIP PAIN, CHRONIC, RIGHT: ICD-10-CM

## 2022-01-03 DIAGNOSIS — F41.9 ANXIETY: Primary | ICD-10-CM

## 2022-01-03 DIAGNOSIS — E78.2 MIXED HYPERLIPIDEMIA: ICD-10-CM

## 2022-01-03 DIAGNOSIS — M76.31 ILIOTIBIAL BAND SYNDROME OF RIGHT SIDE: ICD-10-CM

## 2022-01-03 DIAGNOSIS — M85.88 OSTEOPENIA OF LUMBAR SPINE: ICD-10-CM

## 2022-01-03 PROBLEM — I70.0 AORTIC ATHEROSCLEROSIS: Status: RESOLVED | Noted: 2021-12-01 | Resolved: 2022-01-03

## 2022-01-03 PROCEDURE — 3074F PR MOST RECENT SYSTOLIC BLOOD PRESSURE < 130 MM HG: ICD-10-PCS | Mod: CPTII,S$GLB,, | Performed by: FAMILY MEDICINE

## 2022-01-03 PROCEDURE — 3008F BODY MASS INDEX DOCD: CPT | Mod: CPTII,S$GLB,, | Performed by: FAMILY MEDICINE

## 2022-01-03 PROCEDURE — 1159F PR MEDICATION LIST DOCUMENTED IN MEDICAL RECORD: ICD-10-PCS | Mod: CPTII,S$GLB,, | Performed by: FAMILY MEDICINE

## 2022-01-03 PROCEDURE — 99999 PR PBB SHADOW E&M-EST. PATIENT-LVL IV: ICD-10-PCS | Mod: PBBFAC,,, | Performed by: FAMILY MEDICINE

## 2022-01-03 PROCEDURE — 3074F SYST BP LT 130 MM HG: CPT | Mod: CPTII,S$GLB,, | Performed by: FAMILY MEDICINE

## 2022-01-03 PROCEDURE — 3079F PR MOST RECENT DIASTOLIC BLOOD PRESSURE 80-89 MM HG: ICD-10-PCS | Mod: CPTII,S$GLB,, | Performed by: FAMILY MEDICINE

## 2022-01-03 PROCEDURE — 99999 PR PBB SHADOW E&M-EST. PATIENT-LVL IV: CPT | Mod: PBBFAC,,, | Performed by: FAMILY MEDICINE

## 2022-01-03 PROCEDURE — 3008F PR BODY MASS INDEX (BMI) DOCUMENTED: ICD-10-PCS | Mod: CPTII,S$GLB,, | Performed by: FAMILY MEDICINE

## 2022-01-03 PROCEDURE — 99214 OFFICE O/P EST MOD 30 MIN: CPT | Mod: S$GLB,,, | Performed by: FAMILY MEDICINE

## 2022-01-03 PROCEDURE — 99214 PR OFFICE/OUTPT VISIT, EST, LEVL IV, 30-39 MIN: ICD-10-PCS | Mod: S$GLB,,, | Performed by: FAMILY MEDICINE

## 2022-01-03 PROCEDURE — 3079F DIAST BP 80-89 MM HG: CPT | Mod: CPTII,S$GLB,, | Performed by: FAMILY MEDICINE

## 2022-01-03 PROCEDURE — 1159F MED LIST DOCD IN RCRD: CPT | Mod: CPTII,S$GLB,, | Performed by: FAMILY MEDICINE

## 2022-01-03 RX ORDER — PREDNISONE 50 MG/1
50 TABLET ORAL DAILY
Qty: 5 TABLET | Refills: 0 | Status: SHIPPED | OUTPATIENT
Start: 2022-01-03 | End: 2022-01-08

## 2022-01-03 RX ORDER — ESCITALOPRAM OXALATE 10 MG/1
10 TABLET ORAL DAILY
Qty: 30 TABLET | Refills: 2 | Status: SHIPPED | OUTPATIENT
Start: 2022-01-03 | End: 2022-07-06

## 2022-01-30 ENCOUNTER — PATIENT MESSAGE (OUTPATIENT)
Dept: FAMILY MEDICINE | Facility: CLINIC | Age: 57
End: 2022-01-30
Payer: COMMERCIAL

## 2022-01-30 DIAGNOSIS — M25.551 HIP PAIN, CHRONIC, RIGHT: Primary | ICD-10-CM

## 2022-01-30 DIAGNOSIS — G89.29 HIP PAIN, CHRONIC, RIGHT: Primary | ICD-10-CM

## 2022-01-31 RX ORDER — MELOXICAM 15 MG/1
15 TABLET ORAL DAILY
Qty: 90 TABLET | Refills: 3 | Status: SHIPPED | OUTPATIENT
Start: 2022-01-31 | End: 2022-02-01 | Stop reason: SDUPTHER

## 2022-02-02 DIAGNOSIS — M54.31 SCIATICA OF RIGHT SIDE: ICD-10-CM

## 2022-02-02 DIAGNOSIS — G89.29 HIP PAIN, CHRONIC, RIGHT: ICD-10-CM

## 2022-02-02 DIAGNOSIS — M25.551 HIP PAIN, CHRONIC, RIGHT: ICD-10-CM

## 2022-02-03 RX ORDER — TIZANIDINE 2 MG/1
2 TABLET ORAL EVERY 8 HOURS PRN
Qty: 30 TABLET | Refills: 0 | Status: SHIPPED | OUTPATIENT
Start: 2022-02-03 | End: 2022-07-06

## 2022-02-03 NOTE — TELEPHONE ENCOUNTER
No new care gaps identified.  Powered by [employer]. Reference number: [id number].   2/02/2022 11:04:48 PM CST

## 2022-05-04 ENCOUNTER — TELEPHONE (OUTPATIENT)
Dept: ORTHOPEDICS | Facility: CLINIC | Age: 57
End: 2022-05-04
Payer: COMMERCIAL

## 2022-05-26 DIAGNOSIS — Z12.31 OTHER SCREENING MAMMOGRAM: ICD-10-CM

## 2022-05-30 ENCOUNTER — PATIENT MESSAGE (OUTPATIENT)
Dept: ADMINISTRATIVE | Facility: HOSPITAL | Age: 57
End: 2022-05-30
Payer: COMMERCIAL

## 2022-07-06 ENCOUNTER — OFFICE VISIT (OUTPATIENT)
Dept: FAMILY MEDICINE | Facility: CLINIC | Age: 57
End: 2022-07-06
Payer: COMMERCIAL

## 2022-07-06 VITALS
DIASTOLIC BLOOD PRESSURE: 60 MMHG | RESPIRATION RATE: 18 BRPM | OXYGEN SATURATION: 98 % | HEART RATE: 82 BPM | WEIGHT: 174.13 LBS | SYSTOLIC BLOOD PRESSURE: 112 MMHG | BODY MASS INDEX: 26.39 KG/M2 | HEIGHT: 68 IN

## 2022-07-06 DIAGNOSIS — E78.2 MIXED HYPERLIPIDEMIA: Primary | ICD-10-CM

## 2022-07-06 DIAGNOSIS — F41.9 ANXIETY: ICD-10-CM

## 2022-07-06 DIAGNOSIS — I70.0 AORTIC ATHEROSCLEROSIS: ICD-10-CM

## 2022-07-06 PROCEDURE — 3078F PR MOST RECENT DIASTOLIC BLOOD PRESSURE < 80 MM HG: ICD-10-PCS | Mod: CPTII,S$GLB,, | Performed by: FAMILY MEDICINE

## 2022-07-06 PROCEDURE — 99214 OFFICE O/P EST MOD 30 MIN: CPT | Mod: S$GLB,,, | Performed by: FAMILY MEDICINE

## 2022-07-06 PROCEDURE — 3008F BODY MASS INDEX DOCD: CPT | Mod: CPTII,S$GLB,, | Performed by: FAMILY MEDICINE

## 2022-07-06 PROCEDURE — 99999 PR PBB SHADOW E&M-EST. PATIENT-LVL V: ICD-10-PCS | Mod: PBBFAC,,, | Performed by: FAMILY MEDICINE

## 2022-07-06 PROCEDURE — 1160F PR REVIEW ALL MEDS BY PRESCRIBER/CLIN PHARMACIST DOCUMENTED: ICD-10-PCS | Mod: CPTII,S$GLB,, | Performed by: FAMILY MEDICINE

## 2022-07-06 PROCEDURE — 99214 PR OFFICE/OUTPT VISIT, EST, LEVL IV, 30-39 MIN: ICD-10-PCS | Mod: S$GLB,,, | Performed by: FAMILY MEDICINE

## 2022-07-06 PROCEDURE — 3074F PR MOST RECENT SYSTOLIC BLOOD PRESSURE < 130 MM HG: ICD-10-PCS | Mod: CPTII,S$GLB,, | Performed by: FAMILY MEDICINE

## 2022-07-06 PROCEDURE — 1160F RVW MEDS BY RX/DR IN RCRD: CPT | Mod: CPTII,S$GLB,, | Performed by: FAMILY MEDICINE

## 2022-07-06 PROCEDURE — 1159F MED LIST DOCD IN RCRD: CPT | Mod: CPTII,S$GLB,, | Performed by: FAMILY MEDICINE

## 2022-07-06 PROCEDURE — 3008F PR BODY MASS INDEX (BMI) DOCUMENTED: ICD-10-PCS | Mod: CPTII,S$GLB,, | Performed by: FAMILY MEDICINE

## 2022-07-06 PROCEDURE — 3074F SYST BP LT 130 MM HG: CPT | Mod: CPTII,S$GLB,, | Performed by: FAMILY MEDICINE

## 2022-07-06 PROCEDURE — 1159F PR MEDICATION LIST DOCUMENTED IN MEDICAL RECORD: ICD-10-PCS | Mod: CPTII,S$GLB,, | Performed by: FAMILY MEDICINE

## 2022-07-06 PROCEDURE — 99999 PR PBB SHADOW E&M-EST. PATIENT-LVL V: CPT | Mod: PBBFAC,,, | Performed by: FAMILY MEDICINE

## 2022-07-06 PROCEDURE — 3078F DIAST BP <80 MM HG: CPT | Mod: CPTII,S$GLB,, | Performed by: FAMILY MEDICINE

## 2022-07-06 RX ORDER — ZOSTER VACCINE RECOMBINANT, ADJUVANTED 50 MCG/0.5
KIT INTRAMUSCULAR
Qty: 1 EACH | Refills: 1 | Status: CANCELLED | OUTPATIENT
Start: 2022-07-06

## 2022-07-06 RX ORDER — ATORVASTATIN CALCIUM 20 MG/1
20 TABLET, FILM COATED ORAL DAILY
Qty: 90 TABLET | Refills: 3 | Status: SHIPPED | OUTPATIENT
Start: 2022-07-06 | End: 2023-07-17

## 2022-07-06 NOTE — PROGRESS NOTES
FAMILY MEDICINE  [facility]    Reason for visit:   Chief Complaint   Patient presents with    Follow-up       SUBJECTIVE: Carly Carmen is a 56 y.o. 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 Ofelia Everett    She reports that her anxiety has been well controlled.  She did not start escitalopram 10 mg as we discussed last visit.  She is taking buspirone as needed.  She did risk start her atorvastatin since we last spoke.  She reports that she is tolerating it well.  She denies any unwanted side effects.    2. Hyperlipidemia  - LDL goal < 120    Current treatment:  atorvastatin (LIPITOR) 20 MG tablet, Take 1 tablet (20 mg total) by mouth once daily., Disp: 90 tablet, Rfl: 3   - restarted since last labs   - taking with Co Q10    Side effects from current treatment: none    Lab Results       Component                Value               Date                       CHOL                     146                 07/01/2022                 CHOL                     251 (H)             12/02/2021                 CHOL                     146                 06/25/2018            Lab Results       Component                Value               Date                       HDL                      52                  07/01/2022                 HDL                      56                  12/02/2021                 HDL                      50                  06/25/2018            Lab Results       Component                Value               Date                       LDLCALC                  72.0                07/01/2022                 LDLCALC                  164.6 (H)           12/02/2021                 LDLCALC                  71.0                06/25/2018            Lab Results       Component                Value               Date                        TRIG                     110                 2022                 TRIG                     152 (H)             2021                 TRIG                     125                 2018            Lab Results       Component                Value               Date                       CHOLHDL                  35.6                2022                 CHOLHDL                  22.3                2021                 CHOLHDL                  34.2                2018                    Review of Systems   All other systems reviewed and are negative.      HEALTH MAINTENANCE:   Health Maintenance   Topic Date Due    Mammogram  2022    TETANUS VACCINE  2026    Lipid Panel  2027    Hepatitis C Screening  Completed     Health Maintenance Topics with due status: Not Due       Topic Last Completion Date    Colorectal Cancer Screening 2016    TETANUS VACCINE 2016    Cervical Cancer Screening 2021    Lipid Panel 2022    Influenza Vaccine Not Due     Health Maintenance Due   Topic Date Due    COVID-19 Vaccine (1) Never done    Shingles Vaccine (1 of 2) Never done    Mammogram  2022       HISTORY:   Past Medical History:   Diagnosis Date    Allergy     Chronic hip pain     Hidradenitis suppurativa 2016       Past Surgical History:   Procedure Laterality Date    BREAST BIOPSY Left     BENIGN    COLONOSCOPY N/A 2016    Procedure: COLONOSCOPY;  Surgeon: Luis Santana Jr., MD;  Location: [facility];  Service: Endoscopy;  Laterality: N/A;    CYST REMOVAL      subcutaneous cyst to right chest wall    DILATION AND CURETTAGE OF UTERUS         Family History   Problem Relation Age of Onset    Cancer Mother 69        Lung Cancer with Brain Mets -  72    Heart disease Father         defibrillator    No Known Problems Brother     No Known Problems Brother        Social History     Tobacco Use    Smoking status:  "Former Smoker     Packs/day: 0.50     Years: 30.00     Pack years: 15.00     Types: Cigarettes    Smokeless tobacco: Never Used   Substance Use Topics    Alcohol use: Yes     Alcohol/week: 8.0 standard drinks     Types: 8 Glasses of wine per week     Comment: weekends    Drug use: Yes     Types: Amphetamines       Social History     Social History Narrative    She is from [place].  She works for [employer] as an .   She is twice .  Her first   in .  She remarried in .  She has a 25 year-old daughter from her first marriage, who is getting  in 2014.  She lives in [place] with her  and her 18-year-old step son.           ALLERGIES:   Review of patient's allergies indicates:  No Known Allergies    MEDS:     Current Outpatient Medications:     azelastine (ASTELIN) 137 mcg (0.1 %) nasal spray, 2 sprays (274 mcg total) by Nasal route 2 (two) times daily., Disp: 30 mL, Rfl: 11    busPIRone (BUSPAR) 5 MG Tab, Take 1 tablet (5 mg total) by mouth 3 (three) times daily as needed (anxiety)., Disp: 90 tablet, Rfl: 0    estradiol (ESTRACE) 1 MG tablet, , Disp: , Rfl:     medroxyPROGESTERone (PROVERA) 2.5 MG tablet, Take 2.5 mg by mouth once daily., Disp: , Rfl:     meloxicam (MOBIC) 15 MG tablet, Take 1 tablet (15 mg total) by mouth once daily., Disp: 90 tablet, Rfl: 3    atorvastatin (LIPITOR) 20 MG tablet, Take 1 tablet (20 mg total) by mouth once daily., Disp: 90 tablet, Rfl: 3      Vital signs:   Vitals:    22 1009   BP: 112/60   BP Location: Left arm   Patient Position: Sitting   BP Method: Large (Manual)   Pulse: 82   Resp: 18   SpO2: 98%   Weight: 79 kg (174 lb 1.6 oz)   Height: 5' 8" (1.727 m)     Body mass index is 26.47 kg/m².  PHYSICAL EXAM:     Physical Exam  Constitutional:       General: She is not in acute distress.  Cardiovascular:      Rate and Rhythm: Normal rate and regular rhythm.      " Pulses: Normal pulses.      Heart sounds: Normal heart sounds. No murmur heard.    No friction rub. No gallop.   Pulmonary:      Effort: Pulmonary effort is normal.      Breath sounds: Normal breath sounds. No wheezing, rhonchi or rales.   Abdominal:      General: Bowel sounds are normal.      Palpations: Abdomen is soft. There is no hepatomegaly.   Musculoskeletal:      Cervical back: Neck supple.      Right lower leg: No edema.      Left lower leg: No edema.   Neurological:      Mental Status: She is alert.           PHQ4 = Score: 0    PERTINENT RESULTS:   Lab Visit on 07/01/2022   Component Date Value Ref Range Status    Total Protein 07/01/2022 7.1  6.0 - 8.4 g/dL Final    Albumin 07/01/2022 4.4  3.5 - 5.2 g/dL Final    Total Bilirubin 07/01/2022 1.0  0.1 - 1.0 mg/dL Final    Comment: For infants and newborns, interpretation of results should be based  on gestational age, weight and in agreement with clinical  observations.    Premature Infant recommended reference ranges:  Up to 24 hours.............<8.0 mg/dL  Up to 48 hours............<12.0 mg/dL  3-5 days..................<15.0 mg/dL  6-29 days.................<15.0 mg/dL      AST 07/01/2022 26  15 - 46 U/L Final    ALT 07/01/2022 25  10 - 44 U/L Final    Alkaline Phosphatase 07/01/2022 63  38 - 126 U/L Final    Bilirubin, Direct 07/01/2022 0.5 (A) 0.0 - 0.3 mg/dL Final    Cholesterol 07/01/2022 146  120 - 199 mg/dL Final    Comment: The National Cholesterol Education Program (NCEP) has set the  following guidelines (reference ranges) for Cholesterol:  Optimal.....................<200 mg/dL  Borderline High.............200-239 mg/dL  High........................> or = 240 mg/dL      Triglycerides 07/01/2022 110  30 - 150 mg/dL Final    Comment: The National Cholesterol Education Program (NCEP) has set the  following guidelines (reference values) for triglycerides:  Normal......................<150 mg/dL  Borderline High.............150-199  mg/dL  High........................200-499 mg/dL      HDL 07/01/2022 52  40 - 75 mg/dL Final    Comment: The National Cholesterol Education Program (NCEP) has set the  following guidelines (reference values) for HDL Cholesterol:  Low...............<40 mg/dL  Optimal...........>60 mg/dL      LDL Cholesterol 07/01/2022 72.0  63.0 - 159.0 mg/dL Final    Comment: The National Cholesterol Education Program (NCEP) has set the  following guidelines (reference values) for LDL Cholesterol:  Optimal.......................<130 mg/dL  Borderline High...............130-159 mg/dL  High..........................160-189 mg/dL  Very High.....................>190 mg/dL      HDL/Cholesterol Ratio 07/01/2022 35.6  20.0 - 50.0 % Final    Total Cholesterol/HDL Ratio 07/01/2022 2.8  2.0 - 5.0 Final    Non-HDL Cholesterol 07/01/2022 94  mg/dL Final    Comment: Risk category and Non-HDL cholesterol goals:  Coronary heart disease (CHD)or equivalent (10-year risk of CHD >20%):  Non-HDL cholesterol goal     <130 mg/dL  Two or more CHD risk factors and 10-year risk of CHD <= 20%:  Non-HDL cholesterol goal     <160 mg/dL  0 to 1 CHD risk factor:  Non-HDL cholesterol goal     <190 mg/dL         ASSESSMENT/PLAN:  Problem List Items Addressed This Visit        Psychiatric    Anxiety    Overview     - well controlled  - continue current management plan   - patient encouraged to notify me with any changes              Cardiac/Vascular    Mixed hyperlipidemia - Primary    Overview     Lab Results   Component Value Date    LDLCALC 72.0 07/01/2022     - 12/2/21 LDL improved from 164.6 to 72 since restarting statin  - well controlled  - continue current management plan   - patient encouraged to notify me with any changes           Relevant Medications    atorvastatin (LIPITOR) 20 MG tablet      Other Visit Diagnoses     Aortic atherosclerosis        Relevant Medications    atorvastatin (LIPITOR) 20 MG tablet          ORDERS:   Orders Placed This  Encounter    atorvastatin (LIPITOR) 20 MG tablet       Vaccines recommended:  COVID-19 and shingles vaccine    Follow-up in 1 year or sooner if any concerns.      This note is dictated using the M*Modal Fluency Direct word recognition program. There are word recognition mistakes that are occasionally missed on review.    Dr. Albania Cooper D.O.   [facility]

## 2022-07-12 ENCOUNTER — PATIENT MESSAGE (OUTPATIENT)
Dept: FAMILY MEDICINE | Facility: CLINIC | Age: 57
End: 2022-07-12
Payer: COMMERCIAL

## 2022-07-27 ENCOUNTER — OFFICE VISIT (OUTPATIENT)
Dept: OBSTETRICS AND GYNECOLOGY | Facility: CLINIC | Age: 57
End: 2022-07-27
Payer: COMMERCIAL

## 2022-07-27 VITALS
RESPIRATION RATE: 15 BRPM | SYSTOLIC BLOOD PRESSURE: 118 MMHG | HEIGHT: 68 IN | WEIGHT: 171.69 LBS | BODY MASS INDEX: 26.02 KG/M2 | DIASTOLIC BLOOD PRESSURE: 74 MMHG | HEART RATE: 88 BPM

## 2022-07-27 DIAGNOSIS — Z12.31 BREAST CANCER SCREENING BY MAMMOGRAM: ICD-10-CM

## 2022-07-27 DIAGNOSIS — Z01.419 WELL WOMAN EXAM WITH ROUTINE GYNECOLOGICAL EXAM: Primary | ICD-10-CM

## 2022-07-27 DIAGNOSIS — Z79.890 HORMONE REPLACEMENT THERAPY (HRT): ICD-10-CM

## 2022-07-27 PROCEDURE — 3078F DIAST BP <80 MM HG: CPT | Mod: CPTII,S$GLB,, | Performed by: OBSTETRICS & GYNECOLOGY

## 2022-07-27 PROCEDURE — 3074F SYST BP LT 130 MM HG: CPT | Mod: CPTII,S$GLB,, | Performed by: OBSTETRICS & GYNECOLOGY

## 2022-07-27 PROCEDURE — 99396 PREV VISIT EST AGE 40-64: CPT | Mod: S$GLB,,, | Performed by: OBSTETRICS & GYNECOLOGY

## 2022-07-27 PROCEDURE — 3008F PR BODY MASS INDEX (BMI) DOCUMENTED: ICD-10-PCS | Mod: CPTII,S$GLB,, | Performed by: OBSTETRICS & GYNECOLOGY

## 2022-07-27 PROCEDURE — 99999 PR PBB SHADOW E&M-EST. PATIENT-LVL III: CPT | Mod: PBBFAC,,, | Performed by: OBSTETRICS & GYNECOLOGY

## 2022-07-27 PROCEDURE — 99396 PR PREVENTIVE VISIT,EST,40-64: ICD-10-PCS | Mod: S$GLB,,, | Performed by: OBSTETRICS & GYNECOLOGY

## 2022-07-27 PROCEDURE — 1159F MED LIST DOCD IN RCRD: CPT | Mod: CPTII,S$GLB,, | Performed by: OBSTETRICS & GYNECOLOGY

## 2022-07-27 PROCEDURE — 3074F PR MOST RECENT SYSTOLIC BLOOD PRESSURE < 130 MM HG: ICD-10-PCS | Mod: CPTII,S$GLB,, | Performed by: OBSTETRICS & GYNECOLOGY

## 2022-07-27 PROCEDURE — 3078F PR MOST RECENT DIASTOLIC BLOOD PRESSURE < 80 MM HG: ICD-10-PCS | Mod: CPTII,S$GLB,, | Performed by: OBSTETRICS & GYNECOLOGY

## 2022-07-27 PROCEDURE — 99999 PR PBB SHADOW E&M-EST. PATIENT-LVL III: ICD-10-PCS | Mod: PBBFAC,,, | Performed by: OBSTETRICS & GYNECOLOGY

## 2022-07-27 PROCEDURE — 3008F BODY MASS INDEX DOCD: CPT | Mod: CPTII,S$GLB,, | Performed by: OBSTETRICS & GYNECOLOGY

## 2022-07-27 PROCEDURE — 1159F PR MEDICATION LIST DOCUMENTED IN MEDICAL RECORD: ICD-10-PCS | Mod: CPTII,S$GLB,, | Performed by: OBSTETRICS & GYNECOLOGY

## 2022-07-27 RX ORDER — ESTRADIOL 1 MG/1
1 TABLET ORAL DAILY
Qty: 90 TABLET | Refills: 3 | Status: SHIPPED | OUTPATIENT
Start: 2022-07-27 | End: 2023-07-12 | Stop reason: ALTCHOICE

## 2022-07-27 RX ORDER — MEDROXYPROGESTERONE ACETATE 2.5 MG/1
2.5 TABLET ORAL DAILY
Qty: 90 TABLET | Refills: 3 | Status: SHIPPED | OUTPATIENT
Start: 2022-07-27 | End: 2023-01-26 | Stop reason: ALTCHOICE

## 2022-07-27 NOTE — PROGRESS NOTES
Subjective:    Patient ID: Carly Carmen is a 56 y.o. y.o. female.     Chief Complaint: Annual Well Woman Exam     History of Present Illness:  Carly presents today for Annual Well Woman exam. She describes her menses as absent.She denies pelvic pain.  She denies breast tenderness, masses, nipple discharge. She denies difficulty with urination or bowel movements. She denies menopausal symptoms such as hotflashes, vaginal dryness, and night sweats. She denies bloating, early satiety, or weight changes. She is sexually active. Contraception is by no method.    The following portions of the patient's history were reviewed and updated as appropriate: allergies, current medications, past family history, past medical history, past social history, past surgical history and problem list.      Menstrual History:   No LMP recorded. Patient is postmenopausal..     OB History        3    Para   1    Term   1            AB   2    Living   1       SAB        IAB        Ectopic        Multiple        Live Births   1                 The following portions of the patient's history were reviewed and updated as appropriate: allergies, current medications, past family history, past medical history, past social history, past surgical history and problem list.        ROS:     Review of Systems   Constitutional: Negative for activity change, appetite change, chills, diaphoresis, fatigue, fever and unexpected weight change.   HENT: Negative for mouth sores and tinnitus.    Eyes: Negative for discharge and visual disturbance.   Respiratory: Negative for cough, shortness of breath and wheezing.    Cardiovascular: Negative for chest pain, palpitations and leg swelling.   Gastrointestinal: Negative for abdominal pain, bloating, blood in stool, constipation, diarrhea, nausea and vomiting.   Endocrine: Negative for diabetes, hair loss, hot flashes, hyperthyroidism and hypothyroidism.   Genitourinary: Negative for dysuria,  "flank pain, frequency, genital sores, hematuria, urgency, vaginal bleeding, vaginal discharge, vaginal pain, postcoital bleeding and vaginal odor.   Musculoskeletal: Negative for back pain, joint swelling and myalgias.   Integumentary:  Negative for rash, acne, breast mass, nipple discharge and breast skin changes.   Neurological: Negative for seizures, syncope, numbness and headaches.   Hematological: Negative for adenopathy. Does not bruise/bleed easily.   Psychiatric/Behavioral: Negative for depression and sleep disturbance. The patient is not nervous/anxious.    Breast: Negative for mass, mastodynia, nipple discharge and skin changes      Objective:    Vital Signs:  Vitals:    07/27/22 1234   BP: 118/74   Pulse: 88   Resp: 15   Weight: 77.9 kg (171 lb 11.2 oz)   Height: 5' 8" (1.727 m)         Physical Exam:  General:  alert, cooperative, appears stated age   Skin:  Skin color, texture, turgor normal. No rashes or lesions   HEENT:  conjunctivae/corneas clear. PERRL.   Neck: supple, trachea midline, no adenopathy or thyromegally   Respiratory:  clear to auscultation bilaterally   Heart:  regular rate and rhythm, S1, S2 normal, no murmur, click, rub or gallop   Breasts:  no discharge, erythema, or tenderness   Abdomen:  normal findings: bowel sounds normal, no masses palpable, no organomegaly and soft, non-tender   Pelvis: External genitalia: normal general appearance  Urinary system: urethral meatus normal, bladder nontender  Vaginal: normal mucosa without prolapse or lesions  Cervix: normal appearance  Uterus: normal size, shape, position  Adnexa: normal size, nontender bilaterally   Extremities: Normal ROM; no edema, no cyanosis   Neurologial: Normal strength and tone. No focal numbness or weakness. Reflexes 2+ and equal.   Psychiatric: normal mood, speech, dress, and thought processes         Assessment:       Healthy female exam.     1. Well woman exam with routine gynecological exam    2. Breast cancer " screening by mammogram    3. Hormone replacement therapy (HRT)          Plan:      Pap smear deferred  MMG  UTD  DEXA UTD   Refill HRT  Discussed weight bearing exercise, calcium, and vitamin d for osteoporosis prevention  Colonoscopy UTD; due in 2026  RTC in 1 year    COUNSELING:  Carly was counseled on STD pevention, use and side-effects of various contraceptive measures, A.C.O.G. Pap guidelines and recommendations for yearly pelvic exams in addition to recommendations for monthly self breast exams; to see her PCP for other health maintenance.

## 2022-08-01 ENCOUNTER — TELEPHONE (OUTPATIENT)
Dept: ORTHOPEDICS | Facility: CLINIC | Age: 57
End: 2022-08-01
Payer: COMMERCIAL

## 2022-08-01 NOTE — TELEPHONE ENCOUNTER
Spoke with Mrs. Carmen- pt states she's coming in for leg pain - she will keep scheduled appointment with MD.

## 2022-08-01 NOTE — TELEPHONE ENCOUNTER
----- Message from Isis Carrion sent at 8/1/2022  3:36 PM CDT -----  Type:  Patient Returning Call    Who Called:Pt   Who Left Message for Patient:sendy  Does the patient know what this is regarding?:yes  Would the patient rather a call back or a response via [facility]? Call back   Best Call Back Number: [phone]  Additional Information:   Returning a call

## 2022-08-02 ENCOUNTER — OFFICE VISIT (OUTPATIENT)
Dept: ORTHOPEDICS | Facility: CLINIC | Age: 57
End: 2022-08-02
Payer: COMMERCIAL

## 2022-08-02 VITALS — HEIGHT: 68 IN | WEIGHT: 171.75 LBS | BODY MASS INDEX: 26.03 KG/M2

## 2022-08-02 DIAGNOSIS — M54.16 RIGHT LUMBAR RADICULITIS: Primary | ICD-10-CM

## 2022-08-02 PROCEDURE — 99213 OFFICE O/P EST LOW 20 MIN: CPT | Mod: S$GLB,,, | Performed by: ORTHOPAEDIC SURGERY

## 2022-08-02 PROCEDURE — 1160F PR REVIEW ALL MEDS BY PRESCRIBER/CLIN PHARMACIST DOCUMENTED: ICD-10-PCS | Mod: CPTII,S$GLB,, | Performed by: ORTHOPAEDIC SURGERY

## 2022-08-02 PROCEDURE — 99213 PR OFFICE/OUTPT VISIT, EST, LEVL III, 20-29 MIN: ICD-10-PCS | Mod: S$GLB,,, | Performed by: ORTHOPAEDIC SURGERY

## 2022-08-02 PROCEDURE — 1159F MED LIST DOCD IN RCRD: CPT | Mod: CPTII,S$GLB,, | Performed by: ORTHOPAEDIC SURGERY

## 2022-08-02 PROCEDURE — 1160F RVW MEDS BY RX/DR IN RCRD: CPT | Mod: CPTII,S$GLB,, | Performed by: ORTHOPAEDIC SURGERY

## 2022-08-02 PROCEDURE — 3008F PR BODY MASS INDEX (BMI) DOCUMENTED: ICD-10-PCS | Mod: CPTII,S$GLB,, | Performed by: ORTHOPAEDIC SURGERY

## 2022-08-02 PROCEDURE — 99999 PR PBB SHADOW E&M-EST. PATIENT-LVL III: ICD-10-PCS | Mod: PBBFAC,,, | Performed by: ORTHOPAEDIC SURGERY

## 2022-08-02 PROCEDURE — 1159F PR MEDICATION LIST DOCUMENTED IN MEDICAL RECORD: ICD-10-PCS | Mod: CPTII,S$GLB,, | Performed by: ORTHOPAEDIC SURGERY

## 2022-08-02 PROCEDURE — 99999 PR PBB SHADOW E&M-EST. PATIENT-LVL III: CPT | Mod: PBBFAC,,, | Performed by: ORTHOPAEDIC SURGERY

## 2022-08-02 PROCEDURE — 3008F BODY MASS INDEX DOCD: CPT | Mod: CPTII,S$GLB,, | Performed by: ORTHOPAEDIC SURGERY

## 2022-08-02 RX ORDER — METHYLPREDNISOLONE 4 MG/1
TABLET ORAL
Qty: 1 EACH | Refills: 1 | Status: SHIPPED | OUTPATIENT
Start: 2022-08-02 | End: 2022-10-18 | Stop reason: ALTCHOICE

## 2022-10-01 ENCOUNTER — PATIENT MESSAGE (OUTPATIENT)
Dept: ORTHOPEDICS | Facility: CLINIC | Age: 57
End: 2022-10-01
Payer: COMMERCIAL

## 2022-10-12 ENCOUNTER — PATIENT MESSAGE (OUTPATIENT)
Dept: FAMILY MEDICINE | Facility: CLINIC | Age: 57
End: 2022-10-12
Payer: COMMERCIAL

## 2022-10-13 ENCOUNTER — OFFICE VISIT (OUTPATIENT)
Dept: FAMILY MEDICINE | Facility: CLINIC | Age: 57
End: 2022-10-13
Payer: COMMERCIAL

## 2022-10-13 ENCOUNTER — PATIENT MESSAGE (OUTPATIENT)
Dept: FAMILY MEDICINE | Facility: CLINIC | Age: 57
End: 2022-10-13

## 2022-10-13 VITALS
WEIGHT: 170.19 LBS | HEIGHT: 68 IN | SYSTOLIC BLOOD PRESSURE: 138 MMHG | BODY MASS INDEX: 25.79 KG/M2 | OXYGEN SATURATION: 97 % | HEART RATE: 87 BPM | DIASTOLIC BLOOD PRESSURE: 84 MMHG

## 2022-10-13 DIAGNOSIS — M13.842: Primary | ICD-10-CM

## 2022-10-13 DIAGNOSIS — M25.551 ARTHRALGIA OF HIP OR THIGH, RIGHT: ICD-10-CM

## 2022-10-13 PROCEDURE — 99999 PR PBB SHADOW E&M-EST. PATIENT-LVL III: CPT | Mod: PBBFAC,,, | Performed by: STUDENT IN AN ORGANIZED HEALTH CARE EDUCATION/TRAINING PROGRAM

## 2022-10-13 PROCEDURE — 3075F PR MOST RECENT SYSTOLIC BLOOD PRESS GE 130-139MM HG: ICD-10-PCS | Mod: CPTII,S$GLB,, | Performed by: STUDENT IN AN ORGANIZED HEALTH CARE EDUCATION/TRAINING PROGRAM

## 2022-10-13 PROCEDURE — 1159F PR MEDICATION LIST DOCUMENTED IN MEDICAL RECORD: ICD-10-PCS | Mod: CPTII,S$GLB,, | Performed by: STUDENT IN AN ORGANIZED HEALTH CARE EDUCATION/TRAINING PROGRAM

## 2022-10-13 PROCEDURE — 1160F PR REVIEW ALL MEDS BY PRESCRIBER/CLIN PHARMACIST DOCUMENTED: ICD-10-PCS | Mod: CPTII,S$GLB,, | Performed by: STUDENT IN AN ORGANIZED HEALTH CARE EDUCATION/TRAINING PROGRAM

## 2022-10-13 PROCEDURE — 1160F RVW MEDS BY RX/DR IN RCRD: CPT | Mod: CPTII,S$GLB,, | Performed by: STUDENT IN AN ORGANIZED HEALTH CARE EDUCATION/TRAINING PROGRAM

## 2022-10-13 PROCEDURE — 1159F MED LIST DOCD IN RCRD: CPT | Mod: CPTII,S$GLB,, | Performed by: STUDENT IN AN ORGANIZED HEALTH CARE EDUCATION/TRAINING PROGRAM

## 2022-10-13 PROCEDURE — 99214 PR OFFICE/OUTPT VISIT, EST, LEVL IV, 30-39 MIN: ICD-10-PCS | Mod: S$GLB,,, | Performed by: STUDENT IN AN ORGANIZED HEALTH CARE EDUCATION/TRAINING PROGRAM

## 2022-10-13 PROCEDURE — 3079F DIAST BP 80-89 MM HG: CPT | Mod: CPTII,S$GLB,, | Performed by: STUDENT IN AN ORGANIZED HEALTH CARE EDUCATION/TRAINING PROGRAM

## 2022-10-13 PROCEDURE — 99214 OFFICE O/P EST MOD 30 MIN: CPT | Mod: S$GLB,,, | Performed by: STUDENT IN AN ORGANIZED HEALTH CARE EDUCATION/TRAINING PROGRAM

## 2022-10-13 PROCEDURE — 99999 PR PBB SHADOW E&M-EST. PATIENT-LVL III: ICD-10-PCS | Mod: PBBFAC,,, | Performed by: STUDENT IN AN ORGANIZED HEALTH CARE EDUCATION/TRAINING PROGRAM

## 2022-10-13 PROCEDURE — 3079F PR MOST RECENT DIASTOLIC BLOOD PRESSURE 80-89 MM HG: ICD-10-PCS | Mod: CPTII,S$GLB,, | Performed by: STUDENT IN AN ORGANIZED HEALTH CARE EDUCATION/TRAINING PROGRAM

## 2022-10-13 PROCEDURE — 3075F SYST BP GE 130 - 139MM HG: CPT | Mod: CPTII,S$GLB,, | Performed by: STUDENT IN AN ORGANIZED HEALTH CARE EDUCATION/TRAINING PROGRAM

## 2022-10-13 RX ORDER — INDOMETHACIN 50 MG/1
50 CAPSULE ORAL 3 TIMES DAILY PRN
Qty: 90 CAPSULE | Refills: 1 | Status: SHIPPED | OUTPATIENT
Start: 2022-10-13 | End: 2022-12-08

## 2022-10-13 RX ORDER — INDOMETHACIN 50 MG/1
50 CAPSULE ORAL 3 TIMES DAILY PRN
Qty: 90 CAPSULE | Refills: 1 | Status: SHIPPED | OUTPATIENT
Start: 2022-10-13 | End: 2022-10-13

## 2022-10-13 NOTE — PROGRESS NOTES
[facility] Note    Chief Complaint      Chief Complaint   Patient presents with    Establish Care    Hand Pain     Lt hand swollen/painful    Arm Pain     Rt from shoulders on down     History of Present Illness      HPI    Carly Carmen is a 57 y.o. female with h/o HLD, PMS on HRT, anxiety and chronic right hip/leg pain with sciatica 2/2 right lumbar disc radiculitis patient of Dr Cooper unknown to me who presents o establish care in addition c/o sudden onset left hand swelling worse in the first 2 fingers, R shoulder swelling and pain , dull aching, and improved with use of prednisone and indomethacin alternating with mobic prescribed lobng ago for her right hip pain . She deies any preceding viral prodrome, insect bite, tick bite, fever, chills, swelling in other joints    Ortho : Dr Mirza Singletary  Gyn: Dr Ofelia Everett    Problem List Addressed This Visit:  1. Oligoarthritis of hand, left  -     Sedimentation rate; Future; Expected date: 10/13/2022  -     C-REACTIVE PROTEIN; Future; Expected date: 10/13/2022  -     JUSTINO Screen w/Reflex; Future; Expected date: 10/13/2022  -     Rheumatoid Factor; Future; Expected date: 10/13/2022  -     CYCLIC CITRUL PEPTIDE ANTIBODY, IGG; Future; Expected date: 10/13/2022  -     Uric Acid; Future; Expected date: 10/13/2022  -     Discontinue: indomethacin (INDOCIN) 50 MG capsule; Take 1 capsule (50 mg total) by mouth 3 (three) times daily as needed.  Dispense: 90 capsule; Refill: 1  -     indomethacin (INDOCIN) 50 MG capsule; Take 1 capsule (50 mg total) by mouth 3 (three) times daily as needed.  Dispense: 90 capsule; Refill: 1    2. Arthralgia of hip or thigh, right  Overview:  - 12/01/2021 right hip x-ray:The hip joint space is satisfactorily preserved bilaterally.  There is no fracture, dislocation, or bony erosion.    Orders:  -     Sedimentation rate; Future; Expected date: 10/13/2022  -     C-REACTIVE PROTEIN; Future; Expected date:  10/13/2022  -     JUSTINO Screen w/Reflex; Future; Expected date: 10/13/2022  -     Rheumatoid Factor; Future; Expected date: 10/13/2022  -     CYCLIC CITRUL PEPTIDE ANTIBODY, IGG; Future; Expected date: 10/13/2022  -     Uric Acid; Future; Expected date: 10/13/2022  -     Discontinue: indomethacin (INDOCIN) 50 MG capsule; Take 1 capsule (50 mg total) by mouth 3 (three) times daily as needed.  Dispense: 90 capsule; Refill: 1  -     indomethacin (INDOCIN) 50 MG capsule; Take 1 capsule (50 mg total) by mouth 3 (three) times daily as needed.  Dispense: 90 capsule; Refill: 1       Health Maintenance   Topic Date Due    Mammogram  07/11/2023    TETANUS VACCINE  12/01/2026    Lipid Panel  07/01/2027    Hepatitis C Screening  Completed       Past Medical History:   Diagnosis Date    Abnormal Pap smear of cervix years ago    colpo done    Allergy     Chronic hip pain     Hidradenitis suppurativa 4/8/2016       Past Surgical History:   Procedure Laterality Date    BREAST BIOPSY Left 2015    BENIGN    COLONOSCOPY N/A 1/8/2016    Procedure: COLONOSCOPY;  Surgeon: Luis Santana Jr., MD;  Location[facility];  Service: Endoscopy;  Laterality: N/A;    CYST REMOVAL      subcutaneous cyst to right chest wall    DILATION AND CURETTAGE OF UTERUS         family history includes Cancer (age of onset: 69) in her mother; Heart disease in her father; No Known Problems in her brother and brother.    Social History     Tobacco Use    Smoking status: Former     Packs/day: 0.50     Years: 30.00     Pack years: 15.00     Types: Cigarettes    Smokeless tobacco: Never   Substance Use Topics    Alcohol use: Yes     Alcohol/week: 8.0 standard drinks     Types: 8 Glasses of wine per week     Comment: weekends    Drug use: Yes     Types: Amphetamines       Review of Systems   Constitutional:  Negative for fatigue and fever.   Respiratory:  Negative for cough and chest tightness.    Gastrointestinal:  Negative for abdominal pain, diarrhea and  "vomiting.   Endocrine: Negative for polydipsia and polyphagia.   Genitourinary:  Negative for difficulty urinating, dysuria and frequency.   Musculoskeletal:  Positive for arthralgias, back pain and joint swelling. Negative for gait problem.   Skin:  Negative for rash.   Neurological:  Negative for seizures, weakness, numbness and headaches.   Psychiatric/Behavioral:  Negative for sleep disturbance.      Outpatient Encounter Medications as of 10/13/2022   Medication Sig Dispense Refill    atorvastatin (LIPITOR) 20 MG tablet Take 1 tablet (20 mg total) by mouth once daily. 90 tablet 3    azelastine (ASTELIN) 137 mcg (0.1 %) nasal spray 2 sprays (274 mcg total) by Nasal route 2 (two) times daily. 30 mL 11    busPIRone (BUSPAR) 5 MG Tab Take 1 tablet (5 mg total) by mouth 3 (three) times daily as needed (anxiety). 90 tablet 0    estradioL (ESTRACE) 1 MG tablet Take 1 tablet (1 mg total) by mouth once daily. 90 tablet 3    medroxyPROGESTERone (PROVERA) 2.5 MG tablet Take 1 tablet (2.5 mg total) by mouth once daily. 90 tablet 3    [DISCONTINUED] meloxicam (MOBIC) 15 MG tablet Take 1 tablet (15 mg total) by mouth once daily. 90 tablet 3    indomethacin (INDOCIN) 50 MG capsule Take 1 capsule (50 mg total) by mouth 3 (three) times daily as needed. 90 capsule 1    methylPREDNISolone (MEDROL DOSEPACK) 4 mg tablet use as directed (Patient not taking: Reported on 10/13/2022) 1 each 1    [DISCONTINUED] indomethacin (INDOCIN) 50 MG capsule Take 1 capsule (50 mg total) by mouth 3 (three) times daily as needed. 90 capsule 1     No facility-administered encounter medications on file as of 10/13/2022.        Review of patient's allergies indicates:  No Known Allergies    Physical Exam      Vital Signs  Pulse: 87  SpO2: 97 %  BP: 138/84  BP Location: Left arm  Patient Position: Sitting  Pain Score:   6  Pain Loc: Arm  Height and Weight  Height: 5' 8" (172.7 cm)  Weight: 77.2 kg (170 lb 3.1 oz)  BSA (Calculated - sq m): 1.92 sq " meters  BMI (Calculated): 25.9  Weight in (lb) to have BMI = 25: 164.1]    Physical Exam  Vitals reviewed.   Constitutional:       Appearance: Normal appearance. She is normal weight.   HENT:      Head: Normocephalic and atraumatic.      Right Ear: Tympanic membrane normal.      Left Ear: Tympanic membrane normal.      Mouth/Throat:      Mouth: Mucous membranes are moist.      Pharynx: Oropharynx is clear.   Eyes:      Extraocular Movements: Extraocular movements intact.      Conjunctiva/sclera: Conjunctivae normal.      Pupils: Pupils are equal, round, and reactive to light.   Cardiovascular:      Rate and Rhythm: Normal rate and regular rhythm.      Pulses: Normal pulses.   Pulmonary:      Breath sounds: Normal breath sounds.   Abdominal:      General: Abdomen is flat. Bowel sounds are normal.      Palpations: Abdomen is soft.   Musculoskeletal:         General: Swelling: left first and second  MCP joints.      Cervical back: Normal range of motion.   Skin:     General: Skin is warm and dry.   Neurological:      General: No focal deficit present.      Mental Status: She is alert and oriented to person, place, and time.      Sensory: No sensory deficit.   Psychiatric:         Mood and Affect: Mood normal.         Behavior: Behavior normal.        Laboratory:  CBC:  No results for input(s): WBC, RBC, HGB, HCT, PLT, MCV, MCH, MCHC in the last 2160 hours.  CMP:  No results for input(s): GLU, CALCIUM, ALBUMIN, PROT, NA, K, CO2, CL, BUN, ALKPHOS, ALT, AST, BILITOT in the last 2160 hours.    Invalid input(s): CREATININ  URINALYSIS:  No results for input(s): COLORU, CLARITYU, SPECGRAV, PHUR, PROTEINUA, GLUCOSEU, BILIRUBINCON, BLOODU, WBCU, RBCU, BACTERIA, MUCUS, NITRITE, LEUKOCYTESUR, UROBILINOGEN, HYALINECASTS in the last 2160 hours.   LIPIDS:  No results for input(s): TSH, HDL, CHOL, TRIG, LDLCALC, CHOLHDL, NONHDLCHOL, TOTALCHOLEST in the last 2160 hours.  TSH:  No results for input(s): TSH in the last 2160  hours.  A1C:  No results for input(s): HGBA1C in the last 2160 hours.    Radiology:      Assessment/Plan     Carly Carmen is a 57 y.o.female with:    1. Oligoarthritis of hand, left  -possibly autoimmune etiology : RF?  - Sedimentation rate; Future  - C-REACTIVE PROTEIN; Future  - JUSTINO Screen w/Reflex; Future  - Rheumatoid Factor; Future  - CYCLIC CITRUL PEPTIDE ANTIBODY, IGG; Future  - Uric Acid; Future  - indomethacin (INDOCIN) 50 MG capsule; Take 1 capsule (50 mg total) by mouth 3 (three) times daily as needed.  Dispense: 90 capsule; Refill: 1    2. Arthralgia of hip or thigh, right  - Sedimentation rate; Future  - C-REACTIVE PROTEIN; Future  - JUSTINO Screen w/Reflex; Future  - Rheumatoid Factor; Future  - CYCLIC CITRUL PEPTIDE ANTIBODY, IGG; Future  - Uric Acid; Future  - indomethacin (INDOCIN) 50 MG capsule; Take 1 capsule (50 mg total) by mouth 3 (three) times daily as needed.  Dispense: 90 capsule; Refill: 1    Continue current medications and maintain follow up with specialists.      Patient verbalizes understanding and agrees with current treatment plan.      MD [facility]

## 2022-10-14 ENCOUNTER — PATIENT MESSAGE (OUTPATIENT)
Dept: FAMILY MEDICINE | Facility: CLINIC | Age: 57
End: 2022-10-14
Payer: COMMERCIAL

## 2022-10-15 PROBLEM — M13.842: Status: ACTIVE | Noted: 2022-10-15

## 2022-10-16 ENCOUNTER — PATIENT MESSAGE (OUTPATIENT)
Dept: FAMILY MEDICINE | Facility: CLINIC | Age: 57
End: 2022-10-16
Payer: COMMERCIAL

## 2022-10-18 DIAGNOSIS — M25.542 ARTHRALGIA OF LEFT HAND: Primary | ICD-10-CM

## 2022-10-18 RX ORDER — PREDNISONE 10 MG/1
10 TABLET ORAL DAILY
Qty: 14 TABLET | Refills: 0 | Status: SHIPPED | OUTPATIENT
Start: 2022-10-18 | End: 2022-12-08

## 2022-10-19 ENCOUNTER — TELEPHONE (OUTPATIENT)
Dept: FAMILY MEDICINE | Facility: CLINIC | Age: 57
End: 2022-10-19
Payer: COMMERCIAL

## 2022-10-19 NOTE — TELEPHONE ENCOUNTER
----- Message from Lakeshia Nick sent at 10/19/2022  1:50 PM CDT -----  Regarding: call back  Contact: [phone]  Type:  Patient Returning Call    Who Called: PT   Who Left Message for Patient: Nurse   Does the patient know what this is regarding?: yes   Would the patient rather a call back or a response via [employer]? Call back   Best Call Back Number: [phone]  Additional Information:

## 2022-11-09 ENCOUNTER — TELEPHONE (OUTPATIENT)
Dept: FAMILY MEDICINE | Facility: CLINIC | Age: 57
End: 2022-11-09
Payer: COMMERCIAL

## 2022-11-09 RX ORDER — MELOXICAM 15 MG/1
15 TABLET ORAL DAILY
COMMUNITY
Start: 2022-10-28 | End: 2023-01-09 | Stop reason: ALTCHOICE

## 2022-11-09 NOTE — TELEPHONE ENCOUNTER
Left message for patient.  Refill will not be done.  Patient will have to be seen by Dr. Ross.  Left number to call for appointment or she can do on my chart.

## 2022-12-08 ENCOUNTER — PATIENT MESSAGE (OUTPATIENT)
Dept: FAMILY MEDICINE | Facility: CLINIC | Age: 57
End: 2022-12-08

## 2022-12-08 ENCOUNTER — OFFICE VISIT (OUTPATIENT)
Dept: FAMILY MEDICINE | Facility: CLINIC | Age: 57
End: 2022-12-08
Payer: COMMERCIAL

## 2022-12-08 VITALS
SYSTOLIC BLOOD PRESSURE: 158 MMHG | HEIGHT: 68 IN | BODY MASS INDEX: 25.79 KG/M2 | OXYGEN SATURATION: 96 % | HEART RATE: 81 BPM | DIASTOLIC BLOOD PRESSURE: 90 MMHG | WEIGHT: 170.19 LBS

## 2022-12-08 DIAGNOSIS — M25.50 POLYARTHRALGIA: Primary | ICD-10-CM

## 2022-12-08 DIAGNOSIS — M85.88 OSTEOPENIA OF LUMBAR SPINE: ICD-10-CM

## 2022-12-08 DIAGNOSIS — R03.0 ELEVATED BP WITHOUT DIAGNOSIS OF HYPERTENSION: ICD-10-CM

## 2022-12-08 PROCEDURE — 3077F PR MOST RECENT SYSTOLIC BLOOD PRESSURE >= 140 MM HG: ICD-10-PCS | Mod: CPTII,S$GLB,, | Performed by: STUDENT IN AN ORGANIZED HEALTH CARE EDUCATION/TRAINING PROGRAM

## 2022-12-08 PROCEDURE — 99999 PR PBB SHADOW E&M-EST. PATIENT-LVL IV: CPT | Mod: PBBFAC,,, | Performed by: STUDENT IN AN ORGANIZED HEALTH CARE EDUCATION/TRAINING PROGRAM

## 2022-12-08 PROCEDURE — 3080F PR MOST RECENT DIASTOLIC BLOOD PRESSURE >= 90 MM HG: ICD-10-PCS | Mod: CPTII,S$GLB,, | Performed by: STUDENT IN AN ORGANIZED HEALTH CARE EDUCATION/TRAINING PROGRAM

## 2022-12-08 PROCEDURE — 3008F BODY MASS INDEX DOCD: CPT | Mod: CPTII,S$GLB,, | Performed by: STUDENT IN AN ORGANIZED HEALTH CARE EDUCATION/TRAINING PROGRAM

## 2022-12-08 PROCEDURE — 3008F PR BODY MASS INDEX (BMI) DOCUMENTED: ICD-10-PCS | Mod: CPTII,S$GLB,, | Performed by: STUDENT IN AN ORGANIZED HEALTH CARE EDUCATION/TRAINING PROGRAM

## 2022-12-08 PROCEDURE — 99213 PR OFFICE/OUTPT VISIT, EST, LEVL III, 20-29 MIN: ICD-10-PCS | Mod: S$GLB,,, | Performed by: STUDENT IN AN ORGANIZED HEALTH CARE EDUCATION/TRAINING PROGRAM

## 2022-12-08 PROCEDURE — 99213 OFFICE O/P EST LOW 20 MIN: CPT | Mod: S$GLB,,, | Performed by: STUDENT IN AN ORGANIZED HEALTH CARE EDUCATION/TRAINING PROGRAM

## 2022-12-08 PROCEDURE — 1159F PR MEDICATION LIST DOCUMENTED IN MEDICAL RECORD: ICD-10-PCS | Mod: CPTII,S$GLB,, | Performed by: STUDENT IN AN ORGANIZED HEALTH CARE EDUCATION/TRAINING PROGRAM

## 2022-12-08 PROCEDURE — 1160F PR REVIEW ALL MEDS BY PRESCRIBER/CLIN PHARMACIST DOCUMENTED: ICD-10-PCS | Mod: CPTII,S$GLB,, | Performed by: STUDENT IN AN ORGANIZED HEALTH CARE EDUCATION/TRAINING PROGRAM

## 2022-12-08 PROCEDURE — 3077F SYST BP >= 140 MM HG: CPT | Mod: CPTII,S$GLB,, | Performed by: STUDENT IN AN ORGANIZED HEALTH CARE EDUCATION/TRAINING PROGRAM

## 2022-12-08 PROCEDURE — 99999 PR PBB SHADOW E&M-EST. PATIENT-LVL IV: ICD-10-PCS | Mod: PBBFAC,,, | Performed by: STUDENT IN AN ORGANIZED HEALTH CARE EDUCATION/TRAINING PROGRAM

## 2022-12-08 PROCEDURE — 1159F MED LIST DOCD IN RCRD: CPT | Mod: CPTII,S$GLB,, | Performed by: STUDENT IN AN ORGANIZED HEALTH CARE EDUCATION/TRAINING PROGRAM

## 2022-12-08 PROCEDURE — 3080F DIAST BP >= 90 MM HG: CPT | Mod: CPTII,S$GLB,, | Performed by: STUDENT IN AN ORGANIZED HEALTH CARE EDUCATION/TRAINING PROGRAM

## 2022-12-08 PROCEDURE — 1160F RVW MEDS BY RX/DR IN RCRD: CPT | Mod: CPTII,S$GLB,, | Performed by: STUDENT IN AN ORGANIZED HEALTH CARE EDUCATION/TRAINING PROGRAM

## 2022-12-08 RX ORDER — DULOXETIN HYDROCHLORIDE 30 MG/1
30 CAPSULE, DELAYED RELEASE ORAL DAILY
Qty: 90 CAPSULE | Refills: 0 | Status: SHIPPED | OUTPATIENT
Start: 2022-12-08 | End: 2023-02-17 | Stop reason: SDUPTHER

## 2022-12-08 RX ORDER — HYDROXYCHLOROQUINE SULFATE 200 MG/1
200 TABLET, FILM COATED ORAL 2 TIMES DAILY
Qty: 60 TABLET | Refills: 0 | Status: SHIPPED | OUTPATIENT
Start: 2022-12-08 | End: 2023-01-09 | Stop reason: SDUPTHER

## 2022-12-08 NOTE — PROGRESS NOTES
[facility]    Chief Complaint      Chief Complaint   Patient presents with    Follow-up    Generalized Body Aches     She is still having joint pain all over     History of Present Illness      Follow-up  Associated symptoms include arthralgias and joint swelling. Pertinent negatives include no abdominal pain, coughing, fatigue, fever, headaches, numbness, rash, vomiting or weakness.     Carly Carmen is a 57 y.o. female with h/o HLD, PMS on HRT, anxiety and chronic right hip/leg pain with sciatica 2/2 right lumbar disc radiculitis presented 8 weeks ago with complaints of sudden onset left hand swelling worse in the first 2 fingers, R shoulder swelling and pain , dull aching, and improved with use of prednisone and indomethacin alternating with mobic prescribed long ago for her right hip pain . She denies any preceding viral prodrome, insect bite, tick bite, fever, chills, swelling in other joints. Basic autoimmune workup essentially normal except mild elevation in ESR/CRP, prior XR hip/lumbar  wnl except mild osteopenia, pending evaluation by rheumatologist.    Ortho : Dr Mirza Singletary  Gyn: Dr Ofelia Everett    Problem List Addressed This Visit:  1. Polyarthralgia  -     hydrOXYchloroQUINE (PLAQUENIL) 200 mg tablet; Take 1 tablet (200 mg total) by mouth 2 (two) times daily.  Dispense: 60 tablet; Refill: 0  -     DULoxetine (CYMBALTA) 30 MG capsule; Take 1 capsule (30 mg total) by mouth once daily.  Dispense: 90 capsule; Refill: 0    2. Elevated BP without diagnosis of hypertension    3. Osteopenia of lumbar spine  Overview:  - 12/1/21 Lumbar Xray noted osteopenia lumbar spine  - 12/7/21 Dexa: Normal. Repeat in 5 years         Health Maintenance   Topic Date Due    Mammogram  07/11/2023    TETANUS VACCINE  12/01/2026    Lipid Panel  07/01/2027    Hepatitis C Screening  Completed       Past Medical History:   Diagnosis Date    Abnormal Pap smear of cervix years ago    colpo  done    Allergy     Chronic hip pain     Hidradenitis suppurativa 4/8/2016       Past Surgical History:   Procedure Laterality Date    BREAST BIOPSY Left 2015    BENIGN    COLONOSCOPY N/A 1/8/2016    Procedure: COLONOSCOPY;  Surgeon: Luis Santana Jr., MD;  Location: [facility];  Service: Endoscopy;  Laterality: N/A;    CYST REMOVAL      subcutaneous cyst to right chest wall    DILATION AND CURETTAGE OF UTERUS         family history includes Cancer (age of onset: 69) in her mother; Heart disease in her father; No Known Problems in her brother and brother.    Social History     Tobacco Use    Smoking status: Former     Packs/day: 0.50     Years: 30.00     Pack years: 15.00     Types: Cigarettes    Smokeless tobacco: Never   Substance Use Topics    Alcohol use: Yes     Alcohol/week: 8.0 standard drinks     Types: 8 Glasses of wine per week     Comment: weekends    Drug use: Yes     Types: Amphetamines       Review of Systems   Constitutional:  Negative for fatigue and fever.   Respiratory:  Negative for cough and chest tightness.    Gastrointestinal:  Negative for abdominal pain, diarrhea and vomiting.   Endocrine: Negative for polydipsia and polyphagia.   Genitourinary:  Negative for difficulty urinating, dysuria and frequency.   Musculoskeletal:  Positive for arthralgias, back pain and joint swelling. Negative for gait problem.   Skin:  Negative for rash.   Neurological:  Negative for seizures, weakness, numbness and headaches.   Psychiatric/Behavioral:  Negative for sleep disturbance.      Outpatient Encounter Medications as of 12/8/2022   Medication Sig Dispense Refill    atorvastatin (LIPITOR) 20 MG tablet Take 1 tablet (20 mg total) by mouth once daily. 90 tablet 3    azelastine (ASTELIN) 137 mcg (0.1 %) nasal spray 2 sprays (274 mcg total) by Nasal route 2 (two) times daily. 30 mL 11    busPIRone (BUSPAR) 5 MG Tab Take 1 tablet (5 mg total) by mouth 3 (three) times daily as needed (anxiety). 90 tablet 0     "estradioL (ESTRACE) 1 MG tablet Take 1 tablet (1 mg total) by mouth once daily. 90 tablet 3    medroxyPROGESTERone (PROVERA) 2.5 MG tablet Take 1 tablet (2.5 mg total) by mouth once daily. 90 tablet 3    meloxicam (MOBIC) 15 MG tablet Take 15 mg by mouth once daily.      [DISCONTINUED] indomethacin (INDOCIN) 50 MG capsule Take 1 capsule (50 mg total) by mouth 3 (three) times daily as needed. 90 capsule 1    DULoxetine (CYMBALTA) 30 MG capsule Take 1 capsule (30 mg total) by mouth once daily. 90 capsule 0    hydrOXYchloroQUINE (PLAQUENIL) 200 mg tablet Take 1 tablet (200 mg total) by mouth 2 (two) times daily. 60 tablet 0    [DISCONTINUED] predniSONE (DELTASONE) 10 MG tablet Take 1 tablet (10 mg total) by mouth once daily. (Patient not taking: Reported on 12/8/2022) 14 tablet 0     No facility-administered encounter medications on file as of 12/8/2022.        Review of patient's allergies indicates:  No Known Allergies    Physical Exam      Vital Signs  Pulse: 81  SpO2: 96 %  BP: (!) 158/90  BP Location: Left arm  Patient Position: Sitting  Pain Score:   8  Pain Loc: Generalized  Height and Weight  Height: 5' 8" (172.7 cm)  Weight: 77.2 kg (170 lb 3.1 oz)  BSA (Calculated - sq m): 1.92 sq meters  BMI (Calculated): 25.9  Weight in (lb) to have BMI = 25: 164.1]    Physical Exam  Vitals reviewed.   Constitutional:       Appearance: Normal appearance. She is normal weight.   HENT:      Head: Normocephalic and atraumatic.      Right Ear: Tympanic membrane normal.      Left Ear: Tympanic membrane normal.      Mouth/Throat:      Mouth: Mucous membranes are moist.      Pharynx: Oropharynx is clear.   Eyes:      Extraocular Movements: Extraocular movements intact.      Conjunctiva/sclera: Conjunctivae normal.      Pupils: Pupils are equal, round, and reactive to light.   Cardiovascular:      Rate and Rhythm: Normal rate and regular rhythm.      Pulses: Normal pulses.   Pulmonary:      Breath sounds: Normal breath sounds. "   Abdominal:      General: Abdomen is flat. Bowel sounds are normal.      Palpations: Abdomen is soft.   Musculoskeletal:         General: Swelling: left first and second  MCP joints.      Cervical back: Normal range of motion.   Skin:     General: Skin is warm and dry.   Neurological:      General: No focal deficit present.      Mental Status: She is alert and oriented to person, place, and time.      Sensory: No sensory deficit.   Psychiatric:         Mood and Affect: Mood normal.         Behavior: Behavior normal.        Laboratory:  CBC:  No results for input(s): WBC, RBC, HGB, HCT, PLT, MCV, MCH, MCHC in the last 2160 hours.  CMP:  No results for input(s): GLU, CALCIUM, ALBUMIN, PROT, NA, K, CO2, CL, BUN, ALKPHOS, ALT, AST, BILITOT in the last 2160 hours.    Invalid input(s): CREATININ  URINALYSIS:  No results for input(s): COLORU, CLARITYU, SPECGRAV, PHUR, PROTEINUA, GLUCOSEU, BILIRUBINCON, BLOODU, WBCU, RBCU, BACTERIA, MUCUS, NITRITE, LEUKOCYTESUR, UROBILINOGEN, HYALINECASTS in the last 2160 hours.   LIPIDS:  No results for input(s): TSH, HDL, CHOL, TRIG, LDLCALC, CHOLHDL, NONHDLCHOL, TOTALCHOLEST in the last 2160 hours.  TSH:  No results for input(s): TSH in the last 2160 hours.  A1C:  No results for input(s): HGBA1C in the last 2160 hours.    Radiology:      Assessment/Plan     Carly Carmen is a 57 y.o.female with:    1. 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  Dispense: 60 tablet; Refill: 0  -start DULoxetine (CYMBALTA) 30 MG capsule; Take 1 capsule (30 mg total) by mouth once daily.  Dispense: 90 capsule; Refill: 0  -Need for ophthal evaluation advised in the setting of plaquenil  -tylenol/NSAID PRN  -Maintain follow up with rheumatologist( scheduled  for February)    2. Elevated BP reading  -possibly due to pain  -Advised to keep BP log  -life style modifications reinforced    Vaccines recommended :COVID, Flu and shingles, declined     Continue current medications and maintain follow up with specialists.        Tessy Dewey MD  Internal Medicine  [facility]

## 2022-12-09 RX ORDER — TIZANIDINE 2 MG/1
4 TABLET ORAL EVERY 8 HOURS
COMMUNITY
End: 2022-12-09 | Stop reason: SDUPTHER

## 2022-12-09 RX ORDER — TIZANIDINE 2 MG/1
4 TABLET ORAL EVERY 8 HOURS PRN
Qty: 30 TABLET | Refills: 0 | Status: SHIPPED | OUTPATIENT
Start: 2022-12-09 | End: 2023-01-09 | Stop reason: SDUPTHER

## 2022-12-27 ENCOUNTER — PATIENT MESSAGE (OUTPATIENT)
Dept: FAMILY MEDICINE | Facility: CLINIC | Age: 57
End: 2022-12-27
Payer: COMMERCIAL

## 2022-12-27 NOTE — TELEPHONE ENCOUNTER
You can either offer appt with Brandy this week or with Fran Mai NP in [place] this week if she wants to be seen but I will not order any medications without office visit.  Message sent to patient over portal.

## 2022-12-28 ENCOUNTER — OFFICE VISIT (OUTPATIENT)
Dept: FAMILY MEDICINE | Facility: CLINIC | Age: 57
End: 2022-12-28
Payer: COMMERCIAL

## 2022-12-28 VITALS
OXYGEN SATURATION: 96 % | BODY MASS INDEX: 26.03 KG/M2 | WEIGHT: 171.75 LBS | HEIGHT: 68 IN | HEART RATE: 108 BPM | SYSTOLIC BLOOD PRESSURE: 138 MMHG | DIASTOLIC BLOOD PRESSURE: 80 MMHG

## 2022-12-28 DIAGNOSIS — F41.9 ANXIETY: ICD-10-CM

## 2022-12-28 DIAGNOSIS — M25.50 POLYARTHRALGIA: Primary | ICD-10-CM

## 2022-12-28 PROCEDURE — 1160F PR REVIEW ALL MEDS BY PRESCRIBER/CLIN PHARMACIST DOCUMENTED: ICD-10-PCS | Mod: CPTII,S$GLB,,

## 2022-12-28 PROCEDURE — 99999 PR PBB SHADOW E&M-EST. PATIENT-LVL IV: CPT | Mod: PBBFAC,,,

## 2022-12-28 PROCEDURE — 3008F BODY MASS INDEX DOCD: CPT | Mod: CPTII,S$GLB,,

## 2022-12-28 PROCEDURE — 99214 OFFICE O/P EST MOD 30 MIN: CPT | Mod: S$GLB,,,

## 2022-12-28 PROCEDURE — 3079F DIAST BP 80-89 MM HG: CPT | Mod: CPTII,S$GLB,,

## 2022-12-28 PROCEDURE — 1159F PR MEDICATION LIST DOCUMENTED IN MEDICAL RECORD: ICD-10-PCS | Mod: CPTII,S$GLB,,

## 2022-12-28 PROCEDURE — 99214 PR OFFICE/OUTPT VISIT, EST, LEVL IV, 30-39 MIN: ICD-10-PCS | Mod: S$GLB,,,

## 2022-12-28 PROCEDURE — 99999 PR PBB SHADOW E&M-EST. PATIENT-LVL IV: ICD-10-PCS | Mod: PBBFAC,,,

## 2022-12-28 PROCEDURE — 3079F PR MOST RECENT DIASTOLIC BLOOD PRESSURE 80-89 MM HG: ICD-10-PCS | Mod: CPTII,S$GLB,,

## 2022-12-28 PROCEDURE — 3075F PR MOST RECENT SYSTOLIC BLOOD PRESS GE 130-139MM HG: ICD-10-PCS | Mod: CPTII,S$GLB,,

## 2022-12-28 PROCEDURE — 3075F SYST BP GE 130 - 139MM HG: CPT | Mod: CPTII,S$GLB,,

## 2022-12-28 PROCEDURE — 3008F PR BODY MASS INDEX (BMI) DOCUMENTED: ICD-10-PCS | Mod: CPTII,S$GLB,,

## 2022-12-28 PROCEDURE — 1159F MED LIST DOCD IN RCRD: CPT | Mod: CPTII,S$GLB,,

## 2022-12-28 PROCEDURE — 1160F RVW MEDS BY RX/DR IN RCRD: CPT | Mod: CPTII,S$GLB,,

## 2022-12-28 RX ORDER — INDOMETHACIN 50 MG/1
50 CAPSULE ORAL 3 TIMES DAILY
COMMUNITY
Start: 2022-12-13 | End: 2023-01-09 | Stop reason: SDUPTHER

## 2022-12-28 RX ORDER — METHYLPREDNISOLONE 4 MG/1
TABLET ORAL
Qty: 1 EACH | Refills: 0 | Status: SHIPPED | OUTPATIENT
Start: 2022-12-28 | End: 2023-01-26 | Stop reason: ALTCHOICE

## 2022-12-28 NOTE — PROGRESS NOTES
Subjective:         Chief Complaint: Follow-up (Arm,hand,feet,and finger joint pain since lauren day)     Carly Carmen is a 57 y.o. female, patient of Tessy Dewey MD with piriformis syndrome right side, anxiety, allergic rhinitis, hyperlipidemia, arthralgia of hip, sciatica right side, osteopenia of lumbar spine, IT band syndrome right side, polyarthralgia, unknown to me, presents today with complaints of Arm,hand,feet,and finger joint pain since Lauren day.     Patient reports joint pain to hands, wrist, shoulders that has always been present, worsened on Lauren day. Pain moves from day to day, comes and goes from day to day. Swelling to feet. Recently treated by PCP with hydroxychloroquine 200 mg twice per day and duloxetine 30 mg daily, was advised to take tylenol/NSAID as needed. Currently pending Rheumatology evaluation, scheduled in January. Reports taking Prednisone for 14 days in October, states she didn't start feeling better until day 13. Taking Indomethacin. Reports taking medrol dose indio given by Dr. Singletary for somethng else in the past. States she had a refill from that, she took it earlier in the year and the pain went away. Requesting medrol dose indio.     Follow up with PCP on Jan 23rd.     Pain  This is a recurrent (pain is located to hands, wrists, shoulders) problem. The current episode started in the past 7 days. The problem occurs constantly. The problem has been gradually worsening. Associated symptoms include arthralgias, joint swelling and myalgias. Pertinent negatives include no abdominal pain, anorexia, change in bowel habit, chest pain, chills, congestion, coughing, diaphoresis, fatigue, fever, headaches, nausea, neck pain, numbness, rash, sore throat, swollen glands, urinary symptoms, vertigo, visual change, vomiting or weakness. Exacerbated by: putting on bra, pulling up pants, getting out of bed. She has tried NSAIDs, ice, heat and acetaminophen for the  symptoms. The treatment provided mild relief.     Past Medical History:   Diagnosis Date    Abnormal Pap smear of cervix years ago    colpo done    Allergy     Chronic hip pain     Hidradenitis suppurativa 2016       Past Surgical History:   Procedure Laterality Date    BREAST BIOPSY Left 2015    BENIGN    COLONOSCOPY N/A 2016    Procedure: COLONOSCOPY;  Surgeon: Luis Santana Jr., MD;  Location: [facility];  Service: Endoscopy;  Laterality: N/A;    CYST REMOVAL      subcutaneous cyst to right chest wall    DILATION AND CURETTAGE OF UTERUS         Family History   Problem Relation Age of Onset    Cancer Mother 69        Lung Cancer with Brain Mets -  72    Heart disease Father         defibrillator    No Known Problems Brother     No Known Problems Brother     Breast cancer Neg Hx     Colon cancer Neg Hx     Ovarian cancer Neg Hx        Social History     Socioeconomic History    Marital status:     Number of children: 1   Tobacco Use    Smoking status: Former     Packs/day: 0.50     Years: 30.00     Pack years: 15.00     Types: Cigarettes    Smokeless tobacco: Never   Substance and Sexual Activity    Alcohol use: Yes     Alcohol/week: 8.0 standard drinks     Types: 8 Glasses of wine per week     Comment: weekends    Drug use: Yes     Types: Amphetamines    Sexual activity: Yes     Partners: Male     Birth control/protection: Post-menopausal     Comment:    Social History Narrative    She is from [place].  She works for [employer] as an .   She is twice .  Her first   in .  She remarried in .  She has a 25 year-old daughter from her first marriage, who is getting  in 2014.  She lives in [place] with her  and her 18-year-old step son.         Social Determinants of Health     Financial Resource Strain: Low Risk     Difficulty of Paying Living Expenses: Not hard at all   Food  "Insecurity: No Food Insecurity    Worried About Running Out of Food in the Last Year: Never true    Ran Out of Food in the Last Year: Never true   Transportation Needs: No Transportation Needs    Lack of Transportation (Medical): No    Lack of Transportation (Non-Medical): No   Physical Activity: Unknown    Days of Exercise per Week: Patient refused    Minutes of Exercise per Session: 20 min   Stress: No Stress Concern Present    Feeling of Stress : Only a little   Social Connections: Unknown    Frequency of Communication with Friends and Family: More than three times a week    Frequency of Social Gatherings with Friends and Family: More than three times a week    Active Member of Clubs or Organizations: No    Attends Club or Organization Meetings: Never    Marital Status:    Housing Stability: Low Risk     Unable to Pay for Housing in the Last Year: No    Number of Places Lived in the Last Year: 1    Unstable Housing in the Last Year: No       Review of Systems   Constitutional:  Negative for appetite change, chills, diaphoresis, fatigue and fever.   HENT:  Negative for congestion and sore throat.    Respiratory:  Negative for cough and shortness of breath.    Cardiovascular:  Negative for chest pain, palpitations and leg swelling.   Gastrointestinal:  Negative for abdominal pain, anorexia, change in bowel habit, diarrhea, nausea and vomiting.   Genitourinary: Negative.    Musculoskeletal:  Positive for arthralgias, joint swelling and myalgias. Negative for neck pain.   Skin: Negative.  Negative for color change and rash.   Neurological:  Negative for dizziness, vertigo, weakness, light-headedness, numbness and headaches.       Objective:     Vitals:    12/28/22 0934   BP: 138/80   BP Location: Left arm   Patient Position: Sitting   BP Method: Large (Manual)   Pulse: 108   SpO2: 96%   Weight: 77.9 kg (171 lb 11.8 oz)   Height: 5' 8" (1.727 m)          Physical Exam  Vitals reviewed.   Constitutional:       " Appearance: Normal appearance. She is well-developed and well-groomed.   HENT:      Head: Normocephalic and atraumatic.   Eyes:      General: No scleral icterus.     Extraocular Movements: Extraocular movements intact.   Cardiovascular:      Rate and Rhythm: Normal rate and regular rhythm.      Heart sounds: Normal heart sounds.   Pulmonary:      Effort: Pulmonary effort is normal.      Breath sounds: Normal breath sounds.   Musculoskeletal:      Right wrist: Swelling present.      Left wrist: Swelling present.      Right hand: Swelling present.      Left hand: Swelling present.      Right lower leg: No edema.      Left lower leg: No edema.      Comments: Mild swelling noted to both hands and feet. Good ROM.    Feet:      Comments: Swelling   Skin:     General: Skin is warm and dry.      Capillary Refill: Capillary refill takes less than 2 seconds.   Neurological:      General: No focal deficit present.      Mental Status: She is alert and oriented to person, place, and time.   Psychiatric:         Attention and Perception: Attention normal.         Mood and Affect: Mood normal.         Speech: Speech normal.         Behavior: Behavior is cooperative.         Laboratory:  CBC:  No results for input(s): WBC, RBC, HGB, HCT, PLT, MCV, MCH, MCHC in the last 2160 hours.  CMP:  No results for input(s): GLU, CALCIUM, ALBUMIN, PROT, NA, K, CO2, CL, BUN, ALKPHOS, ALT, AST, BILITOT in the last 2160 hours.    Invalid input(s): CREATININ  URINALYSIS:  No results for input(s): COLORU, CLARITYU, SPECGRAV, PHUR, PROTEINUA, GLUCOSEU, BILIRUBINCON, BLOODU, WBCU, RBCU, BACTERIA, MUCUS, NITRITE, LEUKOCYTESUR, UROBILINOGEN, HYALINECASTS in the last 2160 hours.   LIPIDS:  No results for input(s): TSH, HDL, CHOL, TRIG, LDLCALC, CHOLHDL, NONHDLCHOL, TOTALCHOLEST in the last 2160 hours.  TSH:  No results for input(s): TSH in the last 2160 hours.  A1C:  No results for input(s): HGBA1C in the last 2160 hours.    Assessment:         ICD-10-CM  ICD-9-CM   1. Polyarthralgia  M25.50 719.49   2. Anxiety  F41.9 300.00       Plan:       Polyarthralgia  -     methylPREDNISolone (MEDROL DOSEPACK) 4 mg tablet; use as directed  Dispense: 1 each; Refill: 0  Instructed patient to continue with current medication as prescribed. Add medrol dose indio. Continue indomethacin. Avoid other NSAIDS such as motrin, aleve, ibuprofen, meloxicam. Ok to take tylenol arthritis as needed.   Keep appointment with Rheumatology.  Follow up with PCP as needed.     Anxiety  Chronic; stable on medication. Continue medication as prescribed.    If symptoms worsen, go to ER.  If symptoms do not improve, return to clinic.   Keep appointments with all specialists.     Patient verbalizes understanding and agrees with current treatment plan.      Follow up if symptoms worsen or fail to improve.     Patient's Medications   New Prescriptions    METHYLPREDNISOLONE (MEDROL DOSEPACK) 4 MG TABLET    use as directed   Previous Medications    ATORVASTATIN (LIPITOR) 20 MG TABLET    Take 1 tablet (20 mg total) by mouth once daily.    AZELASTINE (ASTELIN) 137 MCG (0.1 %) NASAL SPRAY    2 sprays (274 mcg total) by Nasal route 2 (two) times daily.    BUSPIRONE (BUSPAR) 5 MG TAB    Take 1 tablet (5 mg total) by mouth 3 (three) times daily as needed (anxiety).    DULOXETINE (CYMBALTA) 30 MG CAPSULE    Take 1 capsule (30 mg total) by mouth once daily.    ESTRADIOL (ESTRACE) 1 MG TABLET    Take 1 tablet (1 mg total) by mouth once daily.    HYDROXYCHLOROQUINE (PLAQUENIL) 200 MG TABLET    Take 1 tablet (200 mg total) by mouth 2 (two) times daily.    INDOMETHACIN (INDOCIN) 50 MG CAPSULE    Take 50 mg by mouth 3 (three) times daily.    MEDROXYPROGESTERONE (PROVERA) 2.5 MG TABLET    Take 1 tablet (2.5 mg total) by mouth once daily.    MELOXICAM (MOBIC) 15 MG TABLET    Take 15 mg by mouth once daily.    TIZANIDINE (ZANAFLEX) 2 MG TABLET    Take 2 tablets (4 mg total) by mouth every 8 (eight) hours as needed.   Modified  Medications    No medications on file   Discontinued Medications    No medications on file         Brandy Sawyer NP

## 2022-12-28 NOTE — PATIENT INSTRUCTIONS
Continue indomethacin. Avoid other NSAIDS such as motrin, aleve, ibuprofen, meloxicam. Ok to take tylenol arthritis as needed.     Keep appointment with Rheumatology.    If symptoms worsen, go to ED/Urgent care.

## 2023-01-09 ENCOUNTER — PATIENT MESSAGE (OUTPATIENT)
Dept: FAMILY MEDICINE | Facility: CLINIC | Age: 58
End: 2023-01-09
Payer: COMMERCIAL

## 2023-01-09 RX ORDER — INDOMETHACIN 50 MG/1
50 CAPSULE ORAL 3 TIMES DAILY
Qty: 90 CAPSULE | Refills: 0 | Status: SHIPPED | OUTPATIENT
Start: 2023-01-09 | End: 2023-01-26

## 2023-01-18 ENCOUNTER — PATIENT MESSAGE (OUTPATIENT)
Dept: RHEUMATOLOGY | Facility: CLINIC | Age: 58
End: 2023-01-18

## 2023-01-18 ENCOUNTER — HOSPITAL ENCOUNTER (OUTPATIENT)
Dept: RADIOLOGY | Facility: HOSPITAL | Age: 58
Discharge: HOME | End: 2023-01-18
Attending: INTERNAL MEDICINE
Payer: COMMERCIAL

## 2023-01-18 ENCOUNTER — OFFICE VISIT (OUTPATIENT)
Dept: RHEUMATOLOGY | Facility: CLINIC | Age: 58
End: 2023-01-18
Payer: COMMERCIAL

## 2023-01-18 VITALS
WEIGHT: 169 LBS | BODY MASS INDEX: 25.61 KG/M2 | HEIGHT: 68 IN | DIASTOLIC BLOOD PRESSURE: 83 MMHG | SYSTOLIC BLOOD PRESSURE: 135 MMHG | HEART RATE: 94 BPM

## 2023-01-18 DIAGNOSIS — M25.50 POLYARTHRALGIA: Primary | ICD-10-CM

## 2023-01-18 DIAGNOSIS — R05.9 COUGH, UNSPECIFIED TYPE: Primary | ICD-10-CM

## 2023-01-18 DIAGNOSIS — Z13.29 SCREENING FOR THYROID DISORDER: ICD-10-CM

## 2023-01-18 DIAGNOSIS — R05.9 COUGH, UNSPECIFIED TYPE: ICD-10-CM

## 2023-01-18 DIAGNOSIS — M25.50 POLYARTHRALGIA: ICD-10-CM

## 2023-01-18 DIAGNOSIS — M25.542 ARTHRALGIA OF LEFT HAND: ICD-10-CM

## 2023-01-18 PROCEDURE — 3008F BODY MASS INDEX DOCD: CPT | Mod: CPTII,S$GLB,, | Performed by: INTERNAL MEDICINE

## 2023-01-18 PROCEDURE — 77077 JOINT SURVEY SINGLE VIEW: CPT | Mod: 26,,, | Performed by: RADIOLOGY

## 2023-01-18 PROCEDURE — 71046 X-RAY EXAM CHEST 2 VIEWS: CPT | Mod: 26,,, | Performed by: RADIOLOGY

## 2023-01-18 PROCEDURE — 3075F SYST BP GE 130 - 139MM HG: CPT | Mod: CPTII,S$GLB,, | Performed by: INTERNAL MEDICINE

## 2023-01-18 PROCEDURE — 1159F PR MEDICATION LIST DOCUMENTED IN MEDICAL RECORD: ICD-10-PCS | Mod: CPTII,S$GLB,, | Performed by: INTERNAL MEDICINE

## 2023-01-18 PROCEDURE — 3079F PR MOST RECENT DIASTOLIC BLOOD PRESSURE 80-89 MM HG: ICD-10-PCS | Mod: CPTII,S$GLB,, | Performed by: INTERNAL MEDICINE

## 2023-01-18 PROCEDURE — 1159F MED LIST DOCD IN RCRD: CPT | Mod: CPTII,S$GLB,, | Performed by: INTERNAL MEDICINE

## 2023-01-18 PROCEDURE — 71046 X-RAY EXAM CHEST 2 VIEWS: CPT | Mod: TC,FY,PO

## 2023-01-18 PROCEDURE — 71046 XR CHEST PA AND LATERAL: ICD-10-PCS | Mod: 26,,, | Performed by: RADIOLOGY

## 2023-01-18 PROCEDURE — 99204 OFFICE O/P NEW MOD 45 MIN: CPT | Mod: S$GLB,,, | Performed by: INTERNAL MEDICINE

## 2023-01-18 PROCEDURE — 99204 PR OFFICE/OUTPT VISIT, NEW, LEVL IV, 45-59 MIN: ICD-10-PCS | Mod: S$GLB,,, | Performed by: INTERNAL MEDICINE

## 2023-01-18 PROCEDURE — 99999 PR PBB SHADOW E&M-EST. PATIENT-LVL IV: CPT | Mod: PBBFAC,,, | Performed by: INTERNAL MEDICINE

## 2023-01-18 PROCEDURE — 3079F DIAST BP 80-89 MM HG: CPT | Mod: CPTII,S$GLB,, | Performed by: INTERNAL MEDICINE

## 2023-01-18 PROCEDURE — 77077 JOINT SURVEY SINGLE VIEW: CPT | Mod: TC,PO

## 2023-01-18 PROCEDURE — 3075F PR MOST RECENT SYSTOLIC BLOOD PRESS GE 130-139MM HG: ICD-10-PCS | Mod: CPTII,S$GLB,, | Performed by: INTERNAL MEDICINE

## 2023-01-18 PROCEDURE — 99999 PR PBB SHADOW E&M-EST. PATIENT-LVL IV: ICD-10-PCS | Mod: PBBFAC,,, | Performed by: INTERNAL MEDICINE

## 2023-01-18 PROCEDURE — 77077 XR ARTHRITIS SURVEY: ICD-10-PCS | Mod: 26,,, | Performed by: RADIOLOGY

## 2023-01-18 PROCEDURE — 3008F PR BODY MASS INDEX (BMI) DOCUMENTED: ICD-10-PCS | Mod: CPTII,S$GLB,, | Performed by: INTERNAL MEDICINE

## 2023-01-18 RX ORDER — METHYLPREDNISOLONE 16 MG/1
48 TABLET ORAL DAILY
Qty: 30 TABLET | Refills: 0 | Status: SHIPPED | OUTPATIENT
Start: 2023-01-18 | End: 2023-01-18 | Stop reason: SDUPTHER

## 2023-01-18 RX ORDER — METHYLPREDNISOLONE 16 MG/1
48 TABLET ORAL DAILY
Qty: 30 TABLET | Refills: 0 | Status: SHIPPED | OUTPATIENT
Start: 2023-01-18 | End: 2023-01-26 | Stop reason: ALTCHOICE

## 2023-01-18 NOTE — PROGRESS NOTES
Subjective:       Patient ID: Carly Carmen is a 57 y.o. female.    Chief Complaint: Disease Management    HPI 57 year old F with no significant PMH here for evaluation. She started to have joint pain beginning of September. She has pain in shoulders, wrists, and hands.  She gets swelling in hands and wrists for the last several months. She is stiff in hands  and wrists all day.  She takes indomethacin 50mg every 6 months.  She is taking plaquenil 200mg po BID for last month.  Pain level is 8/10. She has trouble getting dressed, opening door knob, and driving.  Denies any rashes, oral ulcers, or alopecia.  She smoked 1 ppd for about 30 years. She quit smoking about 4 years ago and now is vaping.    Family hx: negative for RA or SLE     Past Medical History:   Diagnosis Date    Abnormal Pap smear of cervix years ago    colpo done    Allergy     Chronic hip pain     Hidradenitis suppurativa 4/8/2016       Review of Systems   Constitutional:  Negative for activity change, appetite change, chills, diaphoresis, fatigue and fever.   HENT:  Negative for congestion, ear discharge, ear pain, facial swelling, mouth sores, sinus pressure, sneezing, sore throat, tinnitus and trouble swallowing.    Eyes:  Negative for photophobia, pain, discharge, redness, itching and visual disturbance.   Respiratory:  Negative for apnea, cough, chest tightness, shortness of breath, wheezing and stridor.    Cardiovascular:  Negative for chest pain and leg swelling.   Gastrointestinal:  Negative for abdominal distention, abdominal pain, anal bleeding, blood in stool, constipation, diarrhea and nausea.   Endocrine: Negative for cold intolerance and heat intolerance.   Genitourinary:  Negative for difficulty urinating, dysuria and genital sores.   Musculoskeletal:  Positive for arthralgias, back pain, gait problem and joint swelling. Negative for myalgias, neck pain and neck stiffness.   Skin:  Negative for color change, pallor, rash and  "wound.   Neurological:  Negative for dizziness, seizures, light-headedness, numbness and headaches.   Hematological:  Negative for adenopathy. Does not bruise/bleed easily.   Psychiatric/Behavioral:  Negative for sleep disturbance. The patient is not nervous/anxious.          Objective:   /83   Pulse 94   Ht 5' 8" (1.727 m)   Wt 76.7 kg (169 lb)   BMI 25.70 kg/m²      Physical Exam   Constitutional: She is oriented to person, place, and time.   HENT:   Head: Normocephalic and atraumatic.   Right Ear: External ear normal.   Left Ear: External ear normal.   Nose: Nose normal.   Mouth/Throat: Oropharynx is clear and moist. No oropharyngeal exudate.   Eyes: Pupils are equal, round, and reactive to light. Conjunctivae are normal. Right eye exhibits no discharge. Left eye exhibits no discharge. No scleral icterus.   Neck: No JVD present. No thyromegaly present.   Cardiovascular: Normal rate, regular rhythm and normal heart sounds. Exam reveals no gallop and no friction rub.   No murmur heard.  Pulmonary/Chest: Effort normal and breath sounds normal. No respiratory distress. She has no wheezes. She has no rales. She exhibits no tenderness.   Abdominal: Soft. Bowel sounds are normal. She exhibits no distension and no mass. There is no abdominal tenderness. There is no rebound and no guarding.   Musculoskeletal:         General: No tenderness.      Cervical back: Neck supple.   Lymphadenopathy:     She has no cervical adenopathy.   Neurological: She is alert and oriented to person, place, and time. No cranial nerve deficit. Gait normal. Coordination normal.   Skin: Skin is dry. No bruising and no rash noted. No erythema. No jaundice or pallor.   Psychiatric: Affect and judgment normal.      No data to display     Assessment:     57 year old F with no significant PMH here for evaluation of joint pain.  On exam, she has synovitis of her hands concerning for RA.  1. Arthralgia of left hand              Plan:     "   Problem List Items Addressed This Visit    None  Visit Diagnoses       Arthralgia of left hand            Start medrol 48mg a day for 10 days  Labs  Xrays    45 minutes of total time spent on the encounter, which includes face to face time and non-face to face time preparing to see the patient (eg, review of tests), Obtaining and/or reviewing separately obtained history, Documenting clinical information in the electronic or other health record, Independently interpreting results (not separately reported) and communicating results to the patient/family/caregiver, or Care coordination (not separately reported).

## 2023-01-18 NOTE — PROGRESS NOTES
Rapid3 Question Responses and Scores 1/12/2023   MDHAQ Score 0.5   Psychologic Score 3.3   Pain Score 7   When you awakened in the morning OVER THE LAST WEEK, did you feel stiff? Yes   Fatigue Score 3   Global Health Score 3   RAPID3 Score 3.89     Answers submitted by the patient for this visit:  Rheumatology Questionnaire (Submitted on 1/12/2023)  fever: No  eye redness: No  mouth sores: No  headaches: No  shortness of breath: No  chest pain: No  trouble swallowing: No  diarrhea: No  constipation: No  unexpected weight change: No  genital sore: No  dysuria: No  During the last 3 days, have you had a skin rash?: No  Bruises or bleeds easily: No  cough: No

## 2023-01-19 RX ORDER — METHYLPREDNISOLONE 16 MG/1
48 TABLET ORAL DAILY
Qty: 30 TABLET | Refills: 0 | Status: CANCELLED | OUTPATIENT
Start: 2023-01-19 | End: 2023-01-29

## 2023-01-25 ENCOUNTER — OFFICE VISIT (OUTPATIENT)
Dept: RHEUMATOLOGY | Facility: CLINIC | Age: 58
End: 2023-01-25
Payer: COMMERCIAL

## 2023-01-25 VITALS
BODY MASS INDEX: 23.97 KG/M2 | SYSTOLIC BLOOD PRESSURE: 139 MMHG | HEIGHT: 68 IN | WEIGHT: 158.13 LBS | DIASTOLIC BLOOD PRESSURE: 82 MMHG | HEART RATE: 98 BPM

## 2023-01-25 DIAGNOSIS — M79.642 BILATERAL HAND PAIN: ICD-10-CM

## 2023-01-25 DIAGNOSIS — R05.3 CHRONIC COUGH: Primary | ICD-10-CM

## 2023-01-25 DIAGNOSIS — R93.89 ABNORMAL CHEST X-RAY: ICD-10-CM

## 2023-01-25 DIAGNOSIS — M79.89 BILATERAL HAND SWELLING: ICD-10-CM

## 2023-01-25 DIAGNOSIS — M79.641 BILATERAL HAND PAIN: ICD-10-CM

## 2023-01-25 PROCEDURE — 3075F SYST BP GE 130 - 139MM HG: CPT | Mod: CPTII,S$GLB,, | Performed by: INTERNAL MEDICINE

## 2023-01-25 PROCEDURE — 3079F PR MOST RECENT DIASTOLIC BLOOD PRESSURE 80-89 MM HG: ICD-10-PCS | Mod: CPTII,S$GLB,, | Performed by: INTERNAL MEDICINE

## 2023-01-25 PROCEDURE — 3079F DIAST BP 80-89 MM HG: CPT | Mod: CPTII,S$GLB,, | Performed by: INTERNAL MEDICINE

## 2023-01-25 PROCEDURE — 99215 PR OFFICE/OUTPT VISIT, EST, LEVL V, 40-54 MIN: ICD-10-PCS | Mod: S$GLB,,, | Performed by: INTERNAL MEDICINE

## 2023-01-25 PROCEDURE — 3008F BODY MASS INDEX DOCD: CPT | Mod: CPTII,S$GLB,, | Performed by: INTERNAL MEDICINE

## 2023-01-25 PROCEDURE — 3075F PR MOST RECENT SYSTOLIC BLOOD PRESS GE 130-139MM HG: ICD-10-PCS | Mod: CPTII,S$GLB,, | Performed by: INTERNAL MEDICINE

## 2023-01-25 PROCEDURE — 99215 OFFICE O/P EST HI 40 MIN: CPT | Mod: S$GLB,,, | Performed by: INTERNAL MEDICINE

## 2023-01-25 PROCEDURE — 3008F PR BODY MASS INDEX (BMI) DOCUMENTED: ICD-10-PCS | Mod: CPTII,S$GLB,, | Performed by: INTERNAL MEDICINE

## 2023-01-25 PROCEDURE — 99999 PR PBB SHADOW E&M-EST. PATIENT-LVL III: CPT | Mod: PBBFAC,,, | Performed by: INTERNAL MEDICINE

## 2023-01-25 PROCEDURE — 99999 PR PBB SHADOW E&M-EST. PATIENT-LVL III: ICD-10-PCS | Mod: PBBFAC,,, | Performed by: INTERNAL MEDICINE

## 2023-01-25 RX ORDER — UPADACITINIB 15 MG/1
15 TABLET, EXTENDED RELEASE ORAL DAILY
Qty: 30 TABLET | Refills: 11 | Status: SHIPPED | OUTPATIENT
Start: 2023-01-25 | End: 2023-02-15 | Stop reason: ALTCHOICE

## 2023-01-25 RX ORDER — PREDNISONE 20 MG/1
TABLET ORAL
Qty: 120 TABLET | Refills: 0 | Status: SHIPPED | OUTPATIENT
Start: 2023-01-25 | End: 2023-05-19

## 2023-01-25 NOTE — PROGRESS NOTES
Subjective:       Patient ID: Carly Carmen is a 57 y.o. female.    Chief Complaint: Disease Management    HPI 57 year old F with no significant PMH here for evaluation. She started to have joint pain beginning of September. She has pain in shoulders, wrists, and hands.  She gets swelling in hands and wrists for the last several months. She is stiff in hands  and wrists all day.  She takes indomethacin 50mg every 6 months.  She is taking plaquenil 200mg po BID for last month.  Pain level is 8/10. She has trouble getting dressed, opening door knob, and driving.  Denies any rashes, oral ulcers, or alopecia.  She smoked 1 ppd for about 30 years. She quit smoking about 4 years ago and now is vaping.    Family hx: negative for RA or SLE     Interval history: reports improvement after taking steroids.  Pain is 6/10.   She still has swelling in wrists and hands but improved.     Past Medical History:   Diagnosis Date    Abnormal Pap smear of cervix years ago    colpo done    Allergy     Chronic hip pain     Hidradenitis suppurativa 4/8/2016       Review of Systems   Constitutional:  Negative for activity change, appetite change, chills, diaphoresis, fatigue and fever.   HENT:  Negative for congestion, ear discharge, ear pain, facial swelling, mouth sores, sinus pressure, sneezing, sore throat, tinnitus and trouble swallowing.    Eyes:  Negative for photophobia, pain, discharge, redness, itching and visual disturbance.   Respiratory:  Negative for apnea, cough, chest tightness, shortness of breath, wheezing and stridor.    Cardiovascular:  Negative for chest pain and leg swelling.   Gastrointestinal:  Negative for abdominal distention, abdominal pain, anal bleeding, blood in stool, constipation, diarrhea and nausea.   Endocrine: Negative for cold intolerance and heat intolerance.   Genitourinary:  Negative for difficulty urinating, dysuria and genital sores.   Musculoskeletal:  Positive for arthralgias, back pain,  "gait problem and joint swelling. Negative for myalgias, neck pain and neck stiffness.   Skin:  Negative for color change, pallor, rash and wound.   Neurological:  Negative for dizziness, seizures, light-headedness, numbness and headaches.   Hematological:  Negative for adenopathy. Does not bruise/bleed easily.   Psychiatric/Behavioral:  Negative for sleep disturbance. The patient is not nervous/anxious.          Objective:   /83   Pulse 94   Ht 5' 8" (1.727 m)   Wt 76.7 kg (169 lb)   BMI 25.70 kg/m²      Physical Exam   Constitutional: She is oriented to person, place, and time.   HENT:   Head: Normocephalic and atraumatic.   Right Ear: External ear normal.   Left Ear: External ear normal.   Nose: Nose normal.   Mouth/Throat: Oropharynx is clear and moist. No oropharyngeal exudate.   Eyes: Pupils are equal, round, and reactive to light. Conjunctivae are normal. Right eye exhibits no discharge. Left eye exhibits no discharge. No scleral icterus.   Neck: No JVD present. No thyromegaly present.   Cardiovascular: Normal rate, regular rhythm and normal heart sounds. Exam reveals no gallop and no friction rub.   No murmur heard.  Pulmonary/Chest: Effort normal and breath sounds normal. No respiratory distress. She has no wheezes. She has no rales. She exhibits no tenderness.   Abdominal: Soft. Bowel sounds are normal. She exhibits no distension and no mass. There is no abdominal tenderness. There is no rebound and no guarding.   Musculoskeletal:         General: No tenderness.      Cervical back: Neck supple.   Lymphadenopathy:     She has no cervical adenopathy.   Neurological: She is alert and oriented to person, place, and time. No cranial nerve deficit. Gait normal. Coordination normal.   Skin: Skin is dry. No bruising and no rash noted. No erythema. No jaundice or pallor.   Psychiatric: Affect and judgment normal.      No data to display     Assessment:     57 year old F with no significant PMH here for " evaluation of joint pain.  Clinical exam consistent with RA. She has phobia of needles so will start her on rinvoq.    1. Arthralgia of left hand         Plan:       Problem List Items Addressed This Visit    None  Visit Diagnoses       Arthralgia of left hand            CT chest given abnormal chest xray  MRI of hands  Start rinvoq 15 mg po qday (Risks r discussed with patient and not limited to cell count abnormalities, GI perforation, thrombosis,  malignancy, allergic  reaction to medication, and increased risk of infection. Patient agrees with starting medication.  Start   predniSONE (DELTASONE) 20 MG tablet 120 tablet 0 1/25/2023  No   Sig: Take 2 tablets daily for 10 days and then 1 tablet daily  and then 1/2 tablet daily     45 minutes of total time spent on the encounter, which includes face to face time and non-face to face time preparing to see the patient (eg, review of tests), Obtaining and/or reviewing separately obtained history, Documenting clinical information in the electronic or other health record, Independently interpreting results (not separately reported) and communicating results to the patient/family/caregiver, or Care coordination (not separately reported).

## 2023-01-26 ENCOUNTER — SPECIALTY PHARMACY (OUTPATIENT)
Dept: PHARMACY | Facility: CLINIC | Age: 58
End: 2023-01-26
Payer: COMMERCIAL

## 2023-01-26 ENCOUNTER — OFFICE VISIT (OUTPATIENT)
Dept: FAMILY MEDICINE | Facility: CLINIC | Age: 58
End: 2023-01-26
Payer: COMMERCIAL

## 2023-01-26 ENCOUNTER — PATIENT MESSAGE (OUTPATIENT)
Dept: FAMILY MEDICINE | Facility: CLINIC | Age: 58
End: 2023-01-26

## 2023-01-26 VITALS
HEIGHT: 68 IN | SYSTOLIC BLOOD PRESSURE: 124 MMHG | HEART RATE: 96 BPM | OXYGEN SATURATION: 98 % | WEIGHT: 167.81 LBS | DIASTOLIC BLOOD PRESSURE: 80 MMHG | BODY MASS INDEX: 25.43 KG/M2

## 2023-01-26 DIAGNOSIS — M25.542 ARTHRALGIA OF BOTH HANDS: Primary | ICD-10-CM

## 2023-01-26 DIAGNOSIS — M25.541 ARTHRALGIA OF BOTH HANDS: Primary | ICD-10-CM

## 2023-01-26 PROCEDURE — 3074F SYST BP LT 130 MM HG: CPT | Mod: CPTII,S$GLB,, | Performed by: STUDENT IN AN ORGANIZED HEALTH CARE EDUCATION/TRAINING PROGRAM

## 2023-01-26 PROCEDURE — 99999 PR PBB SHADOW E&M-EST. PATIENT-LVL III: CPT | Mod: PBBFAC,,, | Performed by: STUDENT IN AN ORGANIZED HEALTH CARE EDUCATION/TRAINING PROGRAM

## 2023-01-26 PROCEDURE — 1159F PR MEDICATION LIST DOCUMENTED IN MEDICAL RECORD: ICD-10-PCS | Mod: CPTII,S$GLB,, | Performed by: STUDENT IN AN ORGANIZED HEALTH CARE EDUCATION/TRAINING PROGRAM

## 2023-01-26 PROCEDURE — 99999 PR PBB SHADOW E&M-EST. PATIENT-LVL III: ICD-10-PCS | Mod: PBBFAC,,, | Performed by: STUDENT IN AN ORGANIZED HEALTH CARE EDUCATION/TRAINING PROGRAM

## 2023-01-26 PROCEDURE — 1160F PR REVIEW ALL MEDS BY PRESCRIBER/CLIN PHARMACIST DOCUMENTED: ICD-10-PCS | Mod: CPTII,S$GLB,, | Performed by: STUDENT IN AN ORGANIZED HEALTH CARE EDUCATION/TRAINING PROGRAM

## 2023-01-26 PROCEDURE — 3008F PR BODY MASS INDEX (BMI) DOCUMENTED: ICD-10-PCS | Mod: CPTII,S$GLB,, | Performed by: STUDENT IN AN ORGANIZED HEALTH CARE EDUCATION/TRAINING PROGRAM

## 2023-01-26 PROCEDURE — 99212 PR OFFICE/OUTPT VISIT, EST, LEVL II, 10-19 MIN: ICD-10-PCS | Mod: S$GLB,,, | Performed by: STUDENT IN AN ORGANIZED HEALTH CARE EDUCATION/TRAINING PROGRAM

## 2023-01-26 PROCEDURE — 1159F MED LIST DOCD IN RCRD: CPT | Mod: CPTII,S$GLB,, | Performed by: STUDENT IN AN ORGANIZED HEALTH CARE EDUCATION/TRAINING PROGRAM

## 2023-01-26 PROCEDURE — 1160F RVW MEDS BY RX/DR IN RCRD: CPT | Mod: CPTII,S$GLB,, | Performed by: STUDENT IN AN ORGANIZED HEALTH CARE EDUCATION/TRAINING PROGRAM

## 2023-01-26 PROCEDURE — 3074F PR MOST RECENT SYSTOLIC BLOOD PRESSURE < 130 MM HG: ICD-10-PCS | Mod: CPTII,S$GLB,, | Performed by: STUDENT IN AN ORGANIZED HEALTH CARE EDUCATION/TRAINING PROGRAM

## 2023-01-26 PROCEDURE — 99212 OFFICE O/P EST SF 10 MIN: CPT | Mod: S$GLB,,, | Performed by: STUDENT IN AN ORGANIZED HEALTH CARE EDUCATION/TRAINING PROGRAM

## 2023-01-26 PROCEDURE — 3079F PR MOST RECENT DIASTOLIC BLOOD PRESSURE 80-89 MM HG: ICD-10-PCS | Mod: CPTII,S$GLB,, | Performed by: STUDENT IN AN ORGANIZED HEALTH CARE EDUCATION/TRAINING PROGRAM

## 2023-01-26 PROCEDURE — 3079F DIAST BP 80-89 MM HG: CPT | Mod: CPTII,S$GLB,, | Performed by: STUDENT IN AN ORGANIZED HEALTH CARE EDUCATION/TRAINING PROGRAM

## 2023-01-26 PROCEDURE — 3008F BODY MASS INDEX DOCD: CPT | Mod: CPTII,S$GLB,, | Performed by: STUDENT IN AN ORGANIZED HEALTH CARE EDUCATION/TRAINING PROGRAM

## 2023-01-26 NOTE — PROGRESS NOTES
[facility] Note    Chief Complaint      Chief Complaint   Patient presents with    Follow-up     7wk f/u from rheumo     History of Present Illness      Follow-up  Associated symptoms include arthralgias and joint swelling. Pertinent negatives include no abdominal pain, coughing, fatigue, fever, headaches, numbness, rash, vomiting or weakness.     Carly Carmen is a 57 y.o. female with h/o HLD, PMS on HRT, anxiety and chronic right hip/leg pain with sciatica 2/2 right lumbar disc radiculitis and recurrent joints pain for follow up today. Since last visit 6 weeks ago, she has been evaluated by rheum (Dr Velarde) with extensive work up done including XR of the hand all essentially WNL . She has since been started on steroid taper with plan to start her on monoclonal ab (RINVOQ) while etiology still unknown. Today She endorses improvement in swelling of the joints but concerned about the steroid dose and current plan regarding starting medication for unknown etiology. She has no new complaints today.     Rheum : Dr Syd Jarquin MD  Ortho : Dr Mirza Singletary  Gyn: Dr Ofelia Everett    Problem List Addressed This Visit:  1. Arthralgia of both hands        Health Maintenance   Topic Date Due    Mammogram  07/11/2023    TETANUS VACCINE  12/01/2026    Lipid Panel  07/01/2027    Hepatitis C Screening  Completed       Past Medical History:   Diagnosis Date    Abnormal Pap smear of cervix years ago    colpo done    Allergy     Chronic hip pain     Hidradenitis suppurativa 4/8/2016       Past Surgical History:   Procedure Laterality Date    BREAST BIOPSY Left 2015    BENIGN    COLONOSCOPY N/A 1/8/2016    Procedure: COLONOSCOPY;  Surgeon: Luis Santana Jr., MD;  Location: [facility];  Service: Endoscopy;  Laterality: N/A;    CYST REMOVAL      subcutaneous cyst to right chest wall    DILATION AND CURETTAGE OF UTERUS         family history includes Cancer (age of onset: 69) in her mother;  Heart disease in her father; No Known Problems in her brother and brother.    Social History     Tobacco Use    Smoking status: Former     Packs/day: 0.50     Years: 30.00     Pack years: 15.00     Types: Cigarettes    Smokeless tobacco: Never   Substance Use Topics    Alcohol use: Yes     Alcohol/week: 8.0 standard drinks     Types: 8 Glasses of wine per week     Comment: weekends    Drug use: Yes     Types: Amphetamines       Review of Systems   Constitutional:  Negative for fatigue and fever.   Respiratory:  Negative for cough and chest tightness.    Gastrointestinal:  Negative for abdominal pain, diarrhea and vomiting.   Endocrine: Negative for polydipsia and polyphagia.   Genitourinary:  Negative for difficulty urinating, dysuria and frequency.   Musculoskeletal:  Positive for arthralgias, back pain and joint swelling. Negative for gait problem.   Skin:  Negative for rash.   Neurological:  Negative for seizures, weakness, numbness and headaches.   Psychiatric/Behavioral:  Negative for sleep disturbance.      Outpatient Encounter Medications as of 1/26/2023   Medication Sig Dispense Refill    atorvastatin (LIPITOR) 20 MG tablet Take 1 tablet (20 mg total) by mouth once daily. 90 tablet 3    busPIRone (BUSPAR) 5 MG Tab Take 1 tablet (5 mg total) by mouth 3 (three) times daily as needed (anxiety). 90 tablet 0    DULoxetine (CYMBALTA) 30 MG capsule Take 1 capsule (30 mg total) by mouth once daily. 90 capsule 0    estradioL (ESTRACE) 1 MG tablet Take 1 tablet (1 mg total) by mouth once daily. 90 tablet 3    predniSONE (DELTASONE) 20 MG tablet Take 2 tablets daily for 10 days and then 1 tablet daily  and then 1/2 tablet daily 120 tablet 0    tiZANidine (ZANAFLEX) 2 MG tablet Take 2 tablets (4 mg total) by mouth every 8 (eight) hours as needed. 30 tablet 0    upadacitinib (RINVOQ) 15 mg 24 hr tablet Take 1 tablet (15 mg total) by mouth once daily. 30 tablet 11    [DISCONTINUED] hydrOXYchloroQUINE (PLAQUENIL) 200 mg  "tablet Take 1 tablet (200 mg total) by mouth 2 (two) times daily. 60 tablet 0    [DISCONTINUED] indomethacin (INDOCIN) 50 MG capsule Take 1 capsule (50 mg total) by mouth 3 (three) times daily. 90 capsule 0    [DISCONTINUED] medroxyPROGESTERone (PROVERA) 2.5 MG tablet Take 1 tablet (2.5 mg total) by mouth once daily. 90 tablet 3    [DISCONTINUED] methylPREDNISolone (MEDROL DOSEPACK) 4 mg tablet use as directed 1 each 0    [DISCONTINUED] methylPREDNISolone (MEDROL) 16 MG Tab Take 3 tablets (48 mg total) by mouth once daily. for 10 days 30 tablet 0    azelastine (ASTELIN) 137 mcg (0.1 %) nasal spray 2 sprays (274 mcg total) by Nasal route 2 (two) times daily. 30 mL 11    [DISCONTINUED] hydrOXYchloroQUINE (PLAQUENIL) 200 mg tablet Take 1 tablet (200 mg total) by mouth 2 (two) times daily. 60 tablet 0    [DISCONTINUED] indomethacin (INDOCIN) 50 MG capsule Take 50 mg by mouth 3 (three) times daily.      [DISCONTINUED] meloxicam (MOBIC) 15 MG tablet Take 15 mg by mouth once daily.      [DISCONTINUED] tiZANidine (ZANAFLEX) 2 MG tablet Take 2 tablets (4 mg total) by mouth every 8 (eight) hours as needed. 30 tablet 0     No facility-administered encounter medications on file as of 1/26/2023.        Review of patient's allergies indicates:  No Known Allergies    Physical Exam      Vital Signs  Pulse: 96  SpO2: 98 %  BP: 124/80  BP Location: Left arm  Patient Position: Sitting  Pain Score:   6  Pain Loc: Wrist  Height and Weight  Height: 5' 8" (172.7 cm)  Weight: 76.1 kg (167 lb 12.8 oz)  BSA (Calculated - sq m): 1.91 sq meters  BMI (Calculated): 25.5  Weight in (lb) to have BMI = 25: 164.1]    Physical Exam  Vitals reviewed.   Constitutional:       Appearance: Normal appearance. She is normal weight.   HENT:      Head: Normocephalic and atraumatic.      Right Ear: Tympanic membrane normal.      Left Ear: Tympanic membrane normal.      Mouth/Throat:      Mouth: Mucous membranes are moist.      Pharynx: Oropharynx is clear. "   Eyes:      Extraocular Movements: Extraocular movements intact.      Conjunctiva/sclera: Conjunctivae normal.      Pupils: Pupils are equal, round, and reactive to light.   Cardiovascular:      Rate and Rhythm: Normal rate and regular rhythm.      Pulses: Normal pulses.   Pulmonary:      Breath sounds: Normal breath sounds.   Abdominal:      General: Abdomen is flat. Bowel sounds are normal.      Palpations: Abdomen is soft.   Musculoskeletal:         General: Swelling: left first and second  MCP joints.      Cervical back: Normal range of motion.   Skin:     General: Skin is warm and dry.   Neurological:      General: No focal deficit present.      Mental Status: She is alert and oriented to person, place, and time.      Sensory: No sensory deficit.   Psychiatric:         Mood and Affect: Mood normal.         Behavior: Behavior normal.        Laboratory:  CBC:  Recent Labs   Lab Result Units 01/18/23  1159   WBC K/uL 7.20   RBC M/uL 4.59   Hemoglobin g/dL 13.9   Hematocrit % 42.0   Platelets K/uL 394   MCV fL 92   MCH pg 30.3   MCHC g/dL 33.1     CMP:  Recent Labs   Lab Result Units 01/18/23  1159   Glucose mg/dL 98   Calcium mg/dL 9.9   Albumin g/dL 4.1   Total Protein g/dL 7.9   Sodium mmol/L 138   Potassium mmol/L 4.1   CO2 mmol/L 23   Chloride mmol/L 105   BUN mg/dL 12   Alkaline Phosphatase U/L 69   ALT U/L 11   AST U/L 16   Total Bilirubin mg/dL 0.5     URINALYSIS:  No results for input(s): COLORU, CLARITYU, SPECGRAV, PHUR, PROTEINUA, GLUCOSEU, BILIRUBINCON, BLOODU, WBCU, RBCU, BACTERIA, MUCUS, NITRITE, LEUKOCYTESUR, UROBILINOGEN, HYALINECASTS in the last 2160 hours.   LIPIDS:  No results for input(s): TSH, HDL, CHOL, TRIG, LDLCALC, CHOLHDL, NONHDLCHOL, TOTALCHOLEST in the last 2160 hours.  TSH:  No results for input(s): TSH in the last 2160 hours.  A1C:  No results for input(s): HGBA1C in the last 2160 hours.    Radiology:      Assessment/Plan     Carly Carmen is a 57 y.o.female with:    1.  Arthralgia hands  -unknown etiology  -all autoimmune workup negative  -XR hands/Chest essentially WNL  -currently on steroid taper  -Has established car with Dr Velarde  -Plan to start her on RINVOQ  -cw current regimen    Vaccines recommended :COVID, Flu and shingles, declined     Continue current medications and maintain follow up with specialists.        Dr Tessy Dewey MD  Internal Medicine  [facility]

## 2023-01-31 ENCOUNTER — PATIENT MESSAGE (OUTPATIENT)
Dept: FAMILY MEDICINE | Facility: CLINIC | Age: 58
End: 2023-01-31
Payer: COMMERCIAL

## 2023-02-03 NOTE — TELEPHONE ENCOUNTER
Rinvoq PA submitted.  [facility] Key: [id number]     Rinvoq PA denied due to lack of trial and failure of TNFi (Humira and Enbrel) after at least 2 months of therapy.  Case ID: [id number]

## 2023-02-06 NOTE — TELEPHONE ENCOUNTER
Hello, this is Gabriela Ferrera with [facility].  We are working on your prescription that your doctor has sent us. We will be working with your insurance to get this approved for you. We will be calling you along the way with updates on your medication.  If you have any questions, you can reach us at [phone].    Welcome call outcome: Left voicemail  Outgoing call to notify patient of Rinvoq denial and explain appeal process. No answer, LVM.    Will appeal due to needle phobia.

## 2023-02-06 NOTE — TELEPHONE ENCOUNTER
Incoming return call from patient who states she does not mind needles in the autoinjector form, just does not want syringes or infusions. Would like to proceed with Humira.    Message sent to [facility] for medication change request.

## 2023-02-15 ENCOUNTER — SPECIALTY PHARMACY (OUTPATIENT)
Dept: PHARMACY | Facility: CLINIC | Age: 58
End: 2023-02-15
Payer: COMMERCIAL

## 2023-02-15 RX ORDER — ADALIMUMAB 40MG/0.4ML
40 KIT SUBCUTANEOUS
Qty: 2 PEN | Refills: 11 | Status: SHIPPED | OUTPATIENT
Start: 2023-02-15 | End: 2023-10-16 | Stop reason: SDUPTHER

## 2023-02-15 NOTE — TELEPHONE ENCOUNTER
Benefits Investigation   Insurance name: [employer]  Rep name: MARLYN    Copay amount: $5.00 (evoucher applied)  Deductible: $100 (satisfied)  OOPmax: $3250  [facility] in network? Yes  Tier level (if applicable): N/A    Pending for initial consult.

## 2023-02-15 NOTE — TELEPHONE ENCOUNTER
Rinvoq changed to Humira per plan requirement.     Humira PA submitted.  [facility] Key: [id number]     Gracie MYRICK approved from 2/15/23 to 2/15/24.  Case ID: [id number]

## 2023-02-16 ENCOUNTER — SPECIALTY PHARMACY (OUTPATIENT)
Dept: PHARMACY | Facility: CLINIC | Age: 58
End: 2023-02-16
Payer: COMMERCIAL

## 2023-02-16 NOTE — TELEPHONE ENCOUNTER
Specialty Pharmacy - Initial Clinical Assessment    Specialty Medication Orders Linked to Encounter      Flowsheet Row Most Recent Value   Medication #1 adalimumab (HUMIRA,CF, PEN) 40 mg/0.4 mL PnKt (Order#[id number], Rx#[id number])          Patient Diagnosis   Rheumatoid Arthritis    Subjective    Carly Carmen is a 57 y.o. female, who is followed by the specialty pharmacy service for management and education.    Recent Encounters       Date Type Provider Description    02/16/2023 Specialty Pharmacy Gabriela Ferrera PharmD Initial Clinical Assessment    02/15/2023 Specialty Pharmacy Gabriela Ferrera PharmD Referral Authorization    01/26/2023 Specialty Pharmacy Gabriela Ferrera PharmD Referral Authorization          Clinical call attempts since last clinical assessment   2/16/2023  5:35 PM - Specialty Pharmacy - Clinical Assessment by Gabriela Ferrera PharmD     Current Outpatient Medications   Medication Sig    adalimumab (HUMIRA,CF, PEN) 40 mg/0.4 mL PnKt Inject 0.4 mLs (40 mg total) into the skin every 14 (fourteen) days.    atorvastatin (LIPITOR) 20 MG tablet Take 1 tablet (20 mg total) by mouth once daily.    DULoxetine (CYMBALTA) 30 MG capsule Take 1 capsule (30 mg total) by mouth once daily.    estradioL (ESTRACE) 1 MG tablet Take 1 tablet (1 mg total) by mouth once daily.    predniSONE (DELTASONE) 20 MG tablet Take 2 tablets daily for 10 days and then 1 tablet daily  and then 1/2 tablet daily    tiZANidine (ZANAFLEX) 2 MG tablet Take 2 tablets (4 mg total) by mouth every 8 (eight) hours as needed.    azelastine (ASTELIN) 137 mcg (0.1 %) nasal spray 2 sprays (274 mcg total) by Nasal route 2 (two) times daily.    busPIRone (BUSPAR) 5 MG Tab Take 1 tablet (5 mg total) by mouth 3 (three) times daily as needed (anxiety).   Last reviewed on 2/16/2023 11:43 AM by Gabriela Ferrera PharmD    Review of patient's allergies indicates:  No Known AllergiesLast reviewed on  2/16/2023 11:42 AM by Gabriela Ferrera        Adverse Effects     Arthralgias: Pos  Joint swelling: Pos       Assessment Questions - Documented Responses      Flowsheet Row Most Recent Value   Assessment    Medication Reconciliation completed for patient Yes   During the past 4 weeks, has patient missed any activities due to condition or medication? No   During the past 4 weeks, did patient have any of the following urgent care visits? None   Goals of Therapy Status Discussed (new start)   Status of the patients ability to self-administer: Is Able   All education points have been covered with patient? Yes, supplemental printed education provided   Welcome packet contents reviewed and discussed with patient? Yes   Assesment completed? Yes   Plan Therapy being initiated   Do you need to open a clinical intervention (i-vent)? No   Do you want to schedule first shipment? Yes          Refill Questions - Documented Responses      Flowsheet Row Most Recent Value   Patient Availability and HIPAA Verification    Does patient want to proceed with activity? Yes   HIPAA/medical authority confirmed? Yes   Relationship to patient of person spoken to? Self   Refill Screening Questions    When does the patient need to receive the medication? 02/17/23   Refill Delivery Questions    How will the patient receive the medication? Mail   When does the patient need to receive the medication? 02/17/23   Shipping Address Home   Address in [facility] confirmed and updated if neccessary? Yes   Expected Copay ($) 5   Is the patient able to afford the medication copay? Yes   Payment Method CC on file   Days supply of Refill 28   Supplies needed? No supplies needed   Shipment/Pickup Date: 02/16/23            Objective    She has a past medical history of Abnormal Pap smear of cervix (years ago), Allergy, Chronic hip pain, and Hidradenitis suppurativa (4/8/2016).    Tried/failed medications: HCQ    BP Readings from Last 4 Encounters:   01/26/23 124/80   01/25/23 139/82   01/18/23 135/83   12/28/22  "138/80     Ht Readings from Last 4 Encounters:   01/26/23 5' 8" (1.727 m)   01/25/23 5' 8" (1.727 m)   01/18/23 5' 8" (1.727 m)   12/28/22 5' 8" (1.727 m)     Wt Readings from Last 4 Encounters:   01/26/23 76.1 kg (167 lb 12.8 oz)   01/25/23 71.7 kg (158 lb 1.6 oz)   01/18/23 76.7 kg (169 lb)   12/28/22 77.9 kg (171 lb 11.8 oz)     Recent Labs   Lab Result Units 01/18/23  1159   Creatinine mg/dL 0.7   ALT U/L 11   AST U/L 16   Hepatitis B Surface Ag  Non-reactive   Hep B Core Total Ab  Non-reactive     The goals of rheumatoid arthritis treatment include:  Relieving pain and suppressing inflammation  Achieving remission and preventing joint and organ damage  Increasing joint mobility and strength  Preventing infection and other complications of treatment  Reducing long term complications of rheumatoid arthritis  Improving or maintaining physical function and optimal well-being  Improving or maintaining quality of life  Maintaining optimal therapy adherence  Minimizing and managing side effects    Goals of Therapy Status: Discussed (new start)    Assessment/Plan  Patient plans to start therapy on 02/17/23      Indication, dosage, appropriateness, effectiveness, safety and convenience of her specialty medication(s) were reviewed today.     Patient Education   Patient received education on the following:   Expectations and possible outcomes of therapy  Proper use, timely administration, and missed dose management  Duration of therapy  Side effects, including prevention, minimization, and management  Contraindications and safety precautions  New or changed medications, including prescribe and over the counter medications and supplements  Reviews recommended vaccinations, as appropriate  Storage, safe handling, and disposal      Tasks added this encounter   3/10/2023 - Refill Call (Auto Added)  11/16/2023 - Clinical - Follow Up Assesement (Annual)   Tasks due within next 3 months   No tasks due.     Gabriela Ferrera, " PharmD  [facility]  [address]  [facility] [address]  Phone: [phone]  Fax: [phone]

## 2023-02-17 ENCOUNTER — PATIENT MESSAGE (OUTPATIENT)
Dept: FAMILY MEDICINE | Facility: CLINIC | Age: 58
End: 2023-02-17
Payer: COMMERCIAL

## 2023-02-17 DIAGNOSIS — M25.50 POLYARTHRALGIA: ICD-10-CM

## 2023-02-17 RX ORDER — DULOXETIN HYDROCHLORIDE 30 MG/1
30 CAPSULE, DELAYED RELEASE ORAL DAILY
Qty: 90 CAPSULE | Refills: 0 | Status: SHIPPED | OUTPATIENT
Start: 2023-02-17 | End: 2023-05-01

## 2023-02-25 ENCOUNTER — HOSPITAL ENCOUNTER (OUTPATIENT)
Dept: RADIOLOGY | Facility: HOSPITAL | Age: 58
Discharge: HOME | End: 2023-02-25
Attending: INTERNAL MEDICINE
Payer: COMMERCIAL

## 2023-02-25 DIAGNOSIS — M79.642 BILATERAL HAND PAIN: ICD-10-CM

## 2023-02-25 DIAGNOSIS — M79.89 BILATERAL HAND SWELLING: ICD-10-CM

## 2023-02-25 DIAGNOSIS — M79.641 BILATERAL HAND PAIN: ICD-10-CM

## 2023-02-25 PROCEDURE — 73223 MRI HAND_WRIST W WO BILATERAL_RHEUMATOID ARTHRITIS: ICD-10-PCS | Mod: 26,,, | Performed by: RADIOLOGY

## 2023-02-25 PROCEDURE — 73223 MRI JOINT UPR EXTR W/O&W/DYE: CPT | Mod: TC,50

## 2023-02-25 PROCEDURE — 25500020 PHARM REV CODE 255: Performed by: INTERNAL MEDICINE

## 2023-02-25 PROCEDURE — A9585 GADOBUTROL INJECTION: HCPCS | Performed by: INTERNAL MEDICINE

## 2023-02-25 PROCEDURE — 73223 MRI JOINT UPR EXTR W/O&W/DYE: CPT | Mod: 26,,, | Performed by: RADIOLOGY

## 2023-02-25 RX ORDER — GADOBUTROL 604.72 MG/ML
8 INJECTION INTRAVENOUS
Status: COMPLETED | OUTPATIENT
Start: 2023-02-25 | End: 2023-02-25

## 2023-02-25 RX ADMIN — GADOBUTROL 8 ML: 604.72 INJECTION INTRAVENOUS at 04:02

## 2023-02-27 ENCOUNTER — PATIENT MESSAGE (OUTPATIENT)
Dept: RHEUMATOLOGY | Facility: CLINIC | Age: 58
End: 2023-02-27
Payer: COMMERCIAL

## 2023-03-01 ENCOUNTER — PATIENT MESSAGE (OUTPATIENT)
Dept: RHEUMATOLOGY | Facility: CLINIC | Age: 58
End: 2023-03-01
Payer: COMMERCIAL

## 2023-03-01 DIAGNOSIS — R93.89 ABNORMAL CT OF THE CHEST: Primary | ICD-10-CM

## 2023-03-01 DIAGNOSIS — E04.1 THYROID NODULE: ICD-10-CM

## 2023-03-06 ENCOUNTER — PATIENT MESSAGE (OUTPATIENT)
Dept: RHEUMATOLOGY | Facility: CLINIC | Age: 58
End: 2023-03-06
Payer: COMMERCIAL

## 2023-03-13 ENCOUNTER — SPECIALTY PHARMACY (OUTPATIENT)
Dept: PHARMACY | Facility: CLINIC | Age: 58
End: 2023-03-13
Payer: COMMERCIAL

## 2023-03-13 NOTE — TELEPHONE ENCOUNTER
Patient returned [facility]'s phone call. Patient reports she has not been cleared to start Humira post chest x-ray and MRI. Routing assigned [facility] for ivent and follow up. Patient messaged provider for permission and is okay with a call back in 2 weeks if she has not reached out sooner.

## 2023-03-15 ENCOUNTER — PATIENT MESSAGE (OUTPATIENT)
Dept: RHEUMATOLOGY | Facility: CLINIC | Age: 58
End: 2023-03-15
Payer: COMMERCIAL

## 2023-03-17 DIAGNOSIS — R93.89 ABNORMAL CT OF THE CHEST: Primary | ICD-10-CM

## 2023-03-17 RX ORDER — PREDNISONE 10 MG/1
TABLET ORAL
Qty: 60 TABLET | Refills: 2 | Status: SHIPPED | OUTPATIENT
Start: 2023-03-17 | End: 2023-05-19

## 2023-03-28 ENCOUNTER — PATIENT MESSAGE (OUTPATIENT)
Dept: FAMILY MEDICINE | Facility: CLINIC | Age: 58
End: 2023-03-28
Payer: COMMERCIAL

## 2023-04-28 ENCOUNTER — OFFICE VISIT (OUTPATIENT)
Dept: PULMONOLOGY | Facility: CLINIC | Age: 58
End: 2023-04-28
Payer: COMMERCIAL

## 2023-04-28 VITALS
WEIGHT: 178.56 LBS | BODY MASS INDEX: 27.06 KG/M2 | HEART RATE: 107 BPM | SYSTOLIC BLOOD PRESSURE: 130 MMHG | OXYGEN SATURATION: 93 % | HEIGHT: 68 IN | DIASTOLIC BLOOD PRESSURE: 76 MMHG

## 2023-04-28 DIAGNOSIS — R93.89 ABNORMAL CT OF THE CHEST: ICD-10-CM

## 2023-04-28 DIAGNOSIS — I70.0 AORTIC ATHEROSCLEROSIS: Primary | ICD-10-CM

## 2023-04-28 DIAGNOSIS — D84.821 IMMUNODEFICIENCY DUE TO LONG TERM DRUG THERAPY: ICD-10-CM

## 2023-04-28 DIAGNOSIS — J43.2 CENTRILOBULAR EMPHYSEMA: ICD-10-CM

## 2023-04-28 DIAGNOSIS — Z72.0 TOBACCO ABUSE: ICD-10-CM

## 2023-04-28 DIAGNOSIS — Z79.899 IMMUNODEFICIENCY DUE TO LONG TERM DRUG THERAPY: ICD-10-CM

## 2023-04-28 PROBLEM — Z79.60 LONG-TERM USE OF IMMUNOSUPPRESSANT MEDICATION: Status: ACTIVE | Noted: 2023-04-28

## 2023-04-28 PROCEDURE — 1159F PR MEDICATION LIST DOCUMENTED IN MEDICAL RECORD: ICD-10-PCS | Mod: CPTII,S$GLB,, | Performed by: STUDENT IN AN ORGANIZED HEALTH CARE EDUCATION/TRAINING PROGRAM

## 2023-04-28 PROCEDURE — 3078F DIAST BP <80 MM HG: CPT | Mod: CPTII,S$GLB,, | Performed by: STUDENT IN AN ORGANIZED HEALTH CARE EDUCATION/TRAINING PROGRAM

## 2023-04-28 PROCEDURE — 3078F PR MOST RECENT DIASTOLIC BLOOD PRESSURE < 80 MM HG: ICD-10-PCS | Mod: CPTII,S$GLB,, | Performed by: STUDENT IN AN ORGANIZED HEALTH CARE EDUCATION/TRAINING PROGRAM

## 2023-04-28 PROCEDURE — 99204 OFFICE O/P NEW MOD 45 MIN: CPT | Mod: S$GLB,,, | Performed by: STUDENT IN AN ORGANIZED HEALTH CARE EDUCATION/TRAINING PROGRAM

## 2023-04-28 PROCEDURE — 3008F BODY MASS INDEX DOCD: CPT | Mod: CPTII,S$GLB,, | Performed by: STUDENT IN AN ORGANIZED HEALTH CARE EDUCATION/TRAINING PROGRAM

## 2023-04-28 PROCEDURE — 3075F SYST BP GE 130 - 139MM HG: CPT | Mod: CPTII,S$GLB,, | Performed by: STUDENT IN AN ORGANIZED HEALTH CARE EDUCATION/TRAINING PROGRAM

## 2023-04-28 PROCEDURE — 3008F PR BODY MASS INDEX (BMI) DOCUMENTED: ICD-10-PCS | Mod: CPTII,S$GLB,, | Performed by: STUDENT IN AN ORGANIZED HEALTH CARE EDUCATION/TRAINING PROGRAM

## 2023-04-28 PROCEDURE — 1159F MED LIST DOCD IN RCRD: CPT | Mod: CPTII,S$GLB,, | Performed by: STUDENT IN AN ORGANIZED HEALTH CARE EDUCATION/TRAINING PROGRAM

## 2023-04-28 PROCEDURE — 3075F PR MOST RECENT SYSTOLIC BLOOD PRESS GE 130-139MM HG: ICD-10-PCS | Mod: CPTII,S$GLB,, | Performed by: STUDENT IN AN ORGANIZED HEALTH CARE EDUCATION/TRAINING PROGRAM

## 2023-04-28 PROCEDURE — 99999 PR PBB SHADOW E&M-EST. PATIENT-LVL IV: ICD-10-PCS | Mod: PBBFAC,,, | Performed by: STUDENT IN AN ORGANIZED HEALTH CARE EDUCATION/TRAINING PROGRAM

## 2023-04-28 PROCEDURE — 99204 PR OFFICE/OUTPT VISIT, NEW, LEVL IV, 45-59 MIN: ICD-10-PCS | Mod: S$GLB,,, | Performed by: STUDENT IN AN ORGANIZED HEALTH CARE EDUCATION/TRAINING PROGRAM

## 2023-04-28 PROCEDURE — 99999 PR PBB SHADOW E&M-EST. PATIENT-LVL IV: CPT | Mod: PBBFAC,,, | Performed by: STUDENT IN AN ORGANIZED HEALTH CARE EDUCATION/TRAINING PROGRAM

## 2023-04-28 RX ORDER — MEDROXYPROGESTERONE ACETATE 2.5 MG/1
2.5 TABLET ORAL DAILY
COMMUNITY
Start: 2023-03-17

## 2023-04-28 NOTE — PROGRESS NOTES
"Subjective:     Reason for visit:  Abnormal CT scan    Patient ID:  Carly Carmen is a 57 y.o. female with PMS on HRT, anxiety, hyperlipidemia, chronic right hip/leg pain 2/2 piriformis syndrome and polyarthralgias being followed by Rheumatology for an unknown connective tissue disorder being treated as RA    Interval History:  Incidentally found RML opacity on screening CT chest as part of workup prior to immuno modulating therapy for suspected rheumatoid arthritis.  No systemic symptoms.  Denies respiratory complaints or chronic cough.  Actively vaping.      Additional Pulmonary History:  Childhood Illnesses:  None  Occupational/Environmental:  None  Tobacco/Smokin pack per day for 30 years.  Quit 4 years ago.  Now vaping.    Objective:     Vitals:    23 1255   BP: 130/76   Pulse: 107   SpO2: (!) 93%   Weight: 81 kg (178 lb 9.2 oz)   Height: 5' 8" (1.727 m)         Physical Exam  Vitals and nursing note reviewed.   Constitutional:       General: She is not in acute distress.     Appearance: She is not ill-appearing, toxic-appearing or diaphoretic.   Cardiovascular:      Rate and Rhythm: Normal rate.   Pulmonary:      Effort: No tachypnea, accessory muscle usage, respiratory distress or retractions.   Abdominal:      General: There is no distension.   Skin:     General: Skin is warm and dry.      Coloration: Skin is not jaundiced.      Findings: No rash.   Neurological:      General: No focal deficit present.      Mental Status: Mental status is at baseline.        Personal Diagnostic Review and Interpretation  2023 CT chest without contrast:  Aortic atherosclerosis.  Centrilobular emphysema with biapical scarring; stable appearing RML scar vs atelectasis; bibasilar atelectatic changes    2023 CT chest without contrast:  Upper lung zone predominant centrilobular emphysema; RML irregularly shaped opacity      Pertinent Studies Reviewed & Interpreted:     Pulmonary Function Tests: "   None    6 Minute Walk Tests:   None    Echocardiograms:   None      Assessment & Plan:       Problem List Items Addressed This Visit          Pulmonary    Centrilobular emphysema    Overview     CT chest with upper lung zone predominant centrilobular emphysema and biapical scarring.  30 pack year history.  Quit 4 years ago.  Now vaping.  PFTs ordered.              Cardiac/Vascular    Aortic atherosclerosis - Primary    Overview     Seen on 03/13/2023 CT chest.  Known hyperlipidemia.  On atorvastatin.              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  JUSTINO, RF, CCP negative but with polyarthralgias and reportedly clinically consistent with RA.  On prednisone taper currently.  Getting ready to start adalimumab. Patient will be at greater risk for opportunistic infections           Relevant Orders    CT Chest Without Contrast       Other    Abnormal CT of the chest    Overview     Incidentally discovered RML irregular opacity.  Stable between 02/27 and 03/13.  Possibly scar.  Is a greater risk for malignancy due to longstanding tobacco use history but given unusual appearance, would favor three-month follow-up CT.    Also noted is bibasilar reticular appearing infiltrates.  Being treated as clinical RA, however no evidence of this time that it represents a progressive fibrotic process           Relevant Orders    DLCO-Carbon Monoxide Diffusing Capacity    Lung Volumes    Spirometry with/without bronchodilator    CT Chest Without Contrast    Tobacco abuse    Overview     30 pack year smoking history.  Quit 4 years ago and now vaping.  Counseled extensively on the need for smoking cessation.  Patient in denial about the amount of nicotine usage in the dangers of vaping.  Currently vaping the equivalent of 20 cigarettes per day    Meets criteria for yearly screening CT chest which can be followed in ordered per primary.                Portions of the record may have been created with voice-recognition software. Occasional wrong-word or sound-a-like substitutions may have occurred due to the inherent limitations of voice-recognition software. Read the chart carefully and recognize, using context, where substitutions have occurred.

## 2023-05-01 DIAGNOSIS — M25.50 POLYARTHRALGIA: ICD-10-CM

## 2023-05-01 RX ORDER — DULOXETIN HYDROCHLORIDE 30 MG/1
CAPSULE, DELAYED RELEASE ORAL
Qty: 90 CAPSULE | Refills: 3 | Status: SHIPPED | OUTPATIENT
Start: 2023-05-01 | End: 2023-06-09

## 2023-05-02 RX ORDER — INDOMETHACIN 50 MG/1
50 CAPSULE ORAL 2 TIMES DAILY PRN
Qty: 60 CAPSULE | Refills: 0 | Status: SHIPPED | OUTPATIENT
Start: 2023-05-02 | End: 2023-06-16

## 2023-05-17 ENCOUNTER — OFFICE VISIT (OUTPATIENT)
Dept: RHEUMATOLOGY | Facility: CLINIC | Age: 58
End: 2023-05-17
Payer: COMMERCIAL

## 2023-05-17 VITALS
HEART RATE: 103 BPM | WEIGHT: 181 LBS | HEIGHT: 68 IN | SYSTOLIC BLOOD PRESSURE: 144 MMHG | BODY MASS INDEX: 27.43 KG/M2 | DIASTOLIC BLOOD PRESSURE: 86 MMHG

## 2023-05-17 DIAGNOSIS — M06.9 RHEUMATOID ARTHRITIS FLARE: Primary | ICD-10-CM

## 2023-05-17 DIAGNOSIS — R93.89 ABNORMAL CT OF THE CHEST: ICD-10-CM

## 2023-05-17 DIAGNOSIS — M25.50 POLYARTHRALGIA: Primary | ICD-10-CM

## 2023-05-17 PROCEDURE — 1159F PR MEDICATION LIST DOCUMENTED IN MEDICAL RECORD: ICD-10-PCS | Mod: CPTII,S$GLB,, | Performed by: INTERNAL MEDICINE

## 2023-05-17 PROCEDURE — 99215 PR OFFICE/OUTPT VISIT, EST, LEVL V, 40-54 MIN: ICD-10-PCS | Mod: S$GLB,,, | Performed by: INTERNAL MEDICINE

## 2023-05-17 PROCEDURE — 3008F BODY MASS INDEX DOCD: CPT | Mod: CPTII,S$GLB,, | Performed by: INTERNAL MEDICINE

## 2023-05-17 PROCEDURE — 3077F PR MOST RECENT SYSTOLIC BLOOD PRESSURE >= 140 MM HG: ICD-10-PCS | Mod: CPTII,S$GLB,, | Performed by: INTERNAL MEDICINE

## 2023-05-17 PROCEDURE — 99999 PR PBB SHADOW E&M-EST. PATIENT-LVL III: CPT | Mod: PBBFAC,,, | Performed by: INTERNAL MEDICINE

## 2023-05-17 PROCEDURE — 3079F DIAST BP 80-89 MM HG: CPT | Mod: CPTII,S$GLB,, | Performed by: INTERNAL MEDICINE

## 2023-05-17 PROCEDURE — 3079F PR MOST RECENT DIASTOLIC BLOOD PRESSURE 80-89 MM HG: ICD-10-PCS | Mod: CPTII,S$GLB,, | Performed by: INTERNAL MEDICINE

## 2023-05-17 PROCEDURE — 99999 PR PBB SHADOW E&M-EST. PATIENT-LVL III: ICD-10-PCS | Mod: PBBFAC,,, | Performed by: INTERNAL MEDICINE

## 2023-05-17 PROCEDURE — 3077F SYST BP >= 140 MM HG: CPT | Mod: CPTII,S$GLB,, | Performed by: INTERNAL MEDICINE

## 2023-05-17 PROCEDURE — 3008F PR BODY MASS INDEX (BMI) DOCUMENTED: ICD-10-PCS | Mod: CPTII,S$GLB,, | Performed by: INTERNAL MEDICINE

## 2023-05-17 PROCEDURE — 1159F MED LIST DOCD IN RCRD: CPT | Mod: CPTII,S$GLB,, | Performed by: INTERNAL MEDICINE

## 2023-05-17 PROCEDURE — 99215 OFFICE O/P EST HI 40 MIN: CPT | Mod: S$GLB,,, | Performed by: INTERNAL MEDICINE

## 2023-05-17 RX ORDER — AMOXICILLIN 500 MG
CAPSULE ORAL DAILY
COMMUNITY

## 2023-05-17 NOTE — PROGRESS NOTES
Subjective:       Patient ID: Carly Carmen is a 57 y.o. female.    Chief Complaint: Disease Management    HPI 57 year old F with no significant PMH here for evaluation. She started to have joint pain beginning of September. She has pain in shoulders, wrists, and hands.  She gets swelling in hands and wrists for the last several months. She is stiff in hands  and wrists all day.  She takes indomethacin 50mg every 6 months.  She is taking plaquenil 200mg po BID for last month.  Pain level is 8/10. She has trouble getting dressed, opening door knob, and driving.  Denies any rashes, oral ulcers, or alopecia.  She smoked 1 ppd for about 30 years. She quit smoking about 4 years ago and now is vaping.    Family hx: negative for RA or SLE     Interval history: She takes indomethacin 50mg po TID.  She still has swelling in wrists and hands.  She saw pulmonary for abnormal CT and will have repeat scan in 3 months.    Past Medical History:   Diagnosis Date    Abnormal Pap smear of cervix years ago    colpo done    Allergy     Chronic hip pain     Hidradenitis suppurativa 4/8/2016       Review of Systems   Constitutional:  Negative for activity change, appetite change, chills, diaphoresis, fatigue and fever.   HENT:  Negative for congestion, ear discharge, ear pain, facial swelling, mouth sores, sinus pressure, sneezing, sore throat, tinnitus and trouble swallowing.    Eyes:  Negative for photophobia, pain, discharge, redness, itching and visual disturbance.   Respiratory:  Negative for apnea, cough, chest tightness, shortness of breath, wheezing and stridor.    Cardiovascular:  Negative for chest pain and leg swelling.   Gastrointestinal:  Negative for abdominal distention, abdominal pain, anal bleeding, blood in stool, constipation, diarrhea and nausea.   Endocrine: Negative for cold intolerance and heat intolerance.   Genitourinary:  Negative for difficulty urinating, dysuria and genital sores.   Musculoskeletal:   "Positive for arthralgias, back pain, gait problem and joint swelling. Negative for myalgias, neck pain and neck stiffness.   Skin:  Negative for color change, pallor, rash and wound.   Neurological:  Negative for dizziness, seizures, light-headedness, numbness and headaches.   Hematological:  Negative for adenopathy. Does not bruise/bleed easily.   Psychiatric/Behavioral:  Negative for sleep disturbance. The patient is not nervous/anxious.          Objective:   /83   Pulse 94   Ht 5' 8" (1.727 m)   Wt 76.7 kg (169 lb)   BMI 25.70 kg/m²      Physical Exam   Constitutional: She is oriented to person, place, and time.   HENT:   Head: Normocephalic and atraumatic.   Right Ear: External ear normal.   Left Ear: External ear normal.   Nose: Nose normal.   Mouth/Throat: Oropharynx is clear and moist. No oropharyngeal exudate.   Eyes: Pupils are equal, round, and reactive to light. Conjunctivae are normal. Right eye exhibits no discharge. Left eye exhibits no discharge. No scleral icterus.   Neck: No JVD present. No thyromegaly present.   Cardiovascular: Normal rate, regular rhythm and normal heart sounds. Exam reveals no gallop and no friction rub.   No murmur heard.  Pulmonary/Chest: Effort normal and breath sounds normal. No respiratory distress. She has no wheezes. She has no rales. She exhibits no tenderness.   Abdominal: Soft. Bowel sounds are normal. She exhibits no distension and no mass. There is no abdominal tenderness. There is no rebound and no guarding.   Musculoskeletal:         General: No tenderness.      Cervical back: Neck supple.   Lymphadenopathy:     She has no cervical adenopathy.   Neurological: She is alert and oriented to person, place, and time. No cranial nerve deficit. Gait normal. Coordination normal.   Skin: Skin is dry. No bruising and no rash noted. No erythema. No jaundice or pallor.   Psychiatric: Affect and judgment normal.      No data to display     Assessment:     57 year old F " with no significant PMH here for follow up of new diagnosis of seronegative RA.  Clinical exam consistent with RA.  She is flaring so will start her on Humira.  She is to follow up with pulmonary for abnormal CT chest.     1. Arthralgia of left hand         Plan:       Problem List Items Addressed This Visit    None  Visit Diagnoses       Arthralgia of left hand            Start Humira 40mg sub q every 14 days (Risks of TNF inhibitor discussed with patient and not limited to cell count abnormalities, malignancy, allergic  reaction to medication, and increased risk of infection. Patient agrees with starting medication.  Labs in 10 weeks  Avoid steroids due to weight gain  Rtc in 12 weeks    45 minutes of total time spent on the encounter, which includes face to face time and non-face to face time preparing to see the patient (eg, review of tests), Obtaining and/or reviewing separately obtained history, Documenting clinical information in the electronic or other health record, Independently interpreting results (not separately reported) and communicating results to the patient/family/caregiver, or Care coordination (not separately reported).

## 2023-05-18 ENCOUNTER — PATIENT MESSAGE (OUTPATIENT)
Dept: RHEUMATOLOGY | Facility: CLINIC | Age: 58
End: 2023-05-18
Payer: COMMERCIAL

## 2023-05-18 RX ORDER — TIZANIDINE 2 MG/1
4 TABLET ORAL EVERY 8 HOURS PRN
Qty: 30 TABLET | Refills: 0 | Status: SHIPPED | OUTPATIENT
Start: 2023-05-18 | End: 2023-06-16

## 2023-05-19 ENCOUNTER — OFFICE VISIT (OUTPATIENT)
Dept: FAMILY MEDICINE | Facility: CLINIC | Age: 58
End: 2023-05-19
Payer: COMMERCIAL

## 2023-05-19 ENCOUNTER — PATIENT MESSAGE (OUTPATIENT)
Dept: FAMILY MEDICINE | Facility: CLINIC | Age: 58
End: 2023-05-19

## 2023-05-19 VITALS
DIASTOLIC BLOOD PRESSURE: 78 MMHG | HEIGHT: 68 IN | WEIGHT: 178.19 LBS | BODY MASS INDEX: 27.01 KG/M2 | HEART RATE: 97 BPM | OXYGEN SATURATION: 96 % | SYSTOLIC BLOOD PRESSURE: 128 MMHG

## 2023-05-19 DIAGNOSIS — E66.9 ABDOMINAL OBESITY AND METABOLIC SYNDROME: ICD-10-CM

## 2023-05-19 DIAGNOSIS — M06.00 SERONEGATIVE RHEUMATOID ARTHRITIS: ICD-10-CM

## 2023-05-19 DIAGNOSIS — M76.31 ILIOTIBIAL BAND SYNDROME OF RIGHT SIDE: ICD-10-CM

## 2023-05-19 DIAGNOSIS — Z79.620 LONG TERM (CURRENT) USE OF IMMUNOSUPPRESSIVE BIOLOGIC: ICD-10-CM

## 2023-05-19 DIAGNOSIS — Z23 NEED FOR PNEUMOCOCCAL VACCINE: ICD-10-CM

## 2023-05-19 DIAGNOSIS — E66.9 ABDOMINAL OBESITY AND METABOLIC SYNDROME: Primary | ICD-10-CM

## 2023-05-19 DIAGNOSIS — E88.810 ABDOMINAL OBESITY AND METABOLIC SYNDROME: Primary | ICD-10-CM

## 2023-05-19 DIAGNOSIS — E88.810 ABDOMINAL OBESITY AND METABOLIC SYNDROME: ICD-10-CM

## 2023-05-19 DIAGNOSIS — E78.2 MIXED HYPERLIPIDEMIA: ICD-10-CM

## 2023-05-19 DIAGNOSIS — Z13.1 SCREENING FOR DIABETES MELLITUS (DM): ICD-10-CM

## 2023-05-19 DIAGNOSIS — Z12.31 ENCOUNTER FOR SCREENING MAMMOGRAM FOR MALIGNANT NEOPLASM OF BREAST: ICD-10-CM

## 2023-05-19 PROCEDURE — 90471 PNEUMOCOCCAL CONJUGATE VACCINE 20-VALENT: ICD-10-PCS | Mod: S$GLB,,, | Performed by: STUDENT IN AN ORGANIZED HEALTH CARE EDUCATION/TRAINING PROGRAM

## 2023-05-19 PROCEDURE — 1159F MED LIST DOCD IN RCRD: CPT | Mod: CPTII,S$GLB,, | Performed by: STUDENT IN AN ORGANIZED HEALTH CARE EDUCATION/TRAINING PROGRAM

## 2023-05-19 PROCEDURE — 99999 PR PBB SHADOW E&M-EST. PATIENT-LVL IV: ICD-10-PCS | Mod: PBBFAC,,, | Performed by: STUDENT IN AN ORGANIZED HEALTH CARE EDUCATION/TRAINING PROGRAM

## 2023-05-19 PROCEDURE — 3074F SYST BP LT 130 MM HG: CPT | Mod: CPTII,S$GLB,, | Performed by: STUDENT IN AN ORGANIZED HEALTH CARE EDUCATION/TRAINING PROGRAM

## 2023-05-19 PROCEDURE — 1160F RVW MEDS BY RX/DR IN RCRD: CPT | Mod: CPTII,S$GLB,, | Performed by: STUDENT IN AN ORGANIZED HEALTH CARE EDUCATION/TRAINING PROGRAM

## 2023-05-19 PROCEDURE — 3078F DIAST BP <80 MM HG: CPT | Mod: CPTII,S$GLB,, | Performed by: STUDENT IN AN ORGANIZED HEALTH CARE EDUCATION/TRAINING PROGRAM

## 2023-05-19 PROCEDURE — 90677 PNEUMOCOCCAL CONJUGATE VACCINE 20-VALENT: ICD-10-PCS | Mod: S$GLB,,, | Performed by: STUDENT IN AN ORGANIZED HEALTH CARE EDUCATION/TRAINING PROGRAM

## 2023-05-19 PROCEDURE — 3074F PR MOST RECENT SYSTOLIC BLOOD PRESSURE < 130 MM HG: ICD-10-PCS | Mod: CPTII,S$GLB,, | Performed by: STUDENT IN AN ORGANIZED HEALTH CARE EDUCATION/TRAINING PROGRAM

## 2023-05-19 PROCEDURE — 3008F PR BODY MASS INDEX (BMI) DOCUMENTED: ICD-10-PCS | Mod: CPTII,S$GLB,, | Performed by: STUDENT IN AN ORGANIZED HEALTH CARE EDUCATION/TRAINING PROGRAM

## 2023-05-19 PROCEDURE — 1159F PR MEDICATION LIST DOCUMENTED IN MEDICAL RECORD: ICD-10-PCS | Mod: CPTII,S$GLB,, | Performed by: STUDENT IN AN ORGANIZED HEALTH CARE EDUCATION/TRAINING PROGRAM

## 2023-05-19 PROCEDURE — 99999 PR PBB SHADOW E&M-EST. PATIENT-LVL IV: CPT | Mod: PBBFAC,,, | Performed by: STUDENT IN AN ORGANIZED HEALTH CARE EDUCATION/TRAINING PROGRAM

## 2023-05-19 PROCEDURE — 99214 OFFICE O/P EST MOD 30 MIN: CPT | Mod: 25,S$GLB,, | Performed by: STUDENT IN AN ORGANIZED HEALTH CARE EDUCATION/TRAINING PROGRAM

## 2023-05-19 PROCEDURE — 90677 PCV20 VACCINE IM: CPT | Mod: S$GLB,,, | Performed by: STUDENT IN AN ORGANIZED HEALTH CARE EDUCATION/TRAINING PROGRAM

## 2023-05-19 PROCEDURE — 99214 PR OFFICE/OUTPT VISIT, EST, LEVL IV, 30-39 MIN: ICD-10-PCS | Mod: 25,S$GLB,, | Performed by: STUDENT IN AN ORGANIZED HEALTH CARE EDUCATION/TRAINING PROGRAM

## 2023-05-19 PROCEDURE — 1160F PR REVIEW ALL MEDS BY PRESCRIBER/CLIN PHARMACIST DOCUMENTED: ICD-10-PCS | Mod: CPTII,S$GLB,, | Performed by: STUDENT IN AN ORGANIZED HEALTH CARE EDUCATION/TRAINING PROGRAM

## 2023-05-19 PROCEDURE — 90471 IMMUNIZATION ADMIN: CPT | Mod: S$GLB,,, | Performed by: STUDENT IN AN ORGANIZED HEALTH CARE EDUCATION/TRAINING PROGRAM

## 2023-05-19 PROCEDURE — 3078F PR MOST RECENT DIASTOLIC BLOOD PRESSURE < 80 MM HG: ICD-10-PCS | Mod: CPTII,S$GLB,, | Performed by: STUDENT IN AN ORGANIZED HEALTH CARE EDUCATION/TRAINING PROGRAM

## 2023-05-19 PROCEDURE — 3008F BODY MASS INDEX DOCD: CPT | Mod: CPTII,S$GLB,, | Performed by: STUDENT IN AN ORGANIZED HEALTH CARE EDUCATION/TRAINING PROGRAM

## 2023-05-19 RX ORDER — TIRZEPATIDE 5 MG/.5ML
5 INJECTION, SOLUTION SUBCUTANEOUS
Qty: 12 PEN | Refills: 3 | Status: SHIPPED | OUTPATIENT
Start: 2023-05-19 | End: 2023-05-22 | Stop reason: SDUPTHER

## 2023-05-19 NOTE — PROGRESS NOTES
[facility] Note    Chief Complaint      Chief Complaint   Patient presents with    Follow-up     History of Present Illness      Follow-up  Pertinent negatives include no abdominal pain, arthralgias, chest pain, coughing, fatigue, fever, headaches, joint swelling, neck pain, numbness, rash, vomiting or weakness.     Carly Carmen is a 57 y.o. female with HLD, PMS on HRT, anxiety and chronic right hip/leg pain with sciatica 2/2 right lumbar disc radiculitis, centrilobular emphysema and recurrent joints pain recently diagnosed with seronegative rheumatoid arthritis now on humira for follow up today. She endorses remarkable improvement in recurrent joint pain however she has gained over 20lbs since she has been on chronic steroid use and interested in going back to her baseline. She denies any CP, SOB, leg swelling and her joint swelling are improving. She has also been evaluated by pulmonology . She described her mood to be great today.     Rheum : Dr Syd Jarquin MD  Ortho : Dr Mirza Singletary  Gyn: Dr Ofelia Everett  Pulm: Dr Joana Zavala MD    Problem List Addressed This Visit:  1. Seronegative rheumatoid arthritis    2. Long term (current) use of immunosuppressive biologic    3. Screening for diabetes mellitus (DM)  -     HEMOGLOBIN A1C; Future; Expected date: 05/19/2023    4. Need for pneumococcal vaccine  -     (In Office Administered) Pneumococcal Conjugate Vaccine (20 Valent) (IM)    5. Mixed hyperlipidemia  Overview:  Lab Results   Component Value Date    LDLCALC 72.0 07/01/2022     - 12/2/21 LDL improved from 164.6 to 72 since restarting statin  - well controlled  - continue current management plan   - patient encouraged to notify me with any changes      6. Iliotibial band syndrome of right side  Overview:  - reviewed imaging  - suspect IT band syndrome  - instructed on stretches  - recommend trial prednisone 50 mg daily for 5 days  - recommend evaluation by Sports  Medicine if no improvement      7. Encounter for screening mammogram for malignant neoplasm of breast  -     Mammo Digital Screening Bilat w/ Titus; Future; Expected date: 07/24/2023        Health Maintenance   Topic Date Due    Mammogram  07/11/2023    High Dose Statin  05/17/2024    TETANUS VACCINE  12/01/2026    Lipid Panel  07/01/2027    Hepatitis C Screening  Completed       Past Medical History:   Diagnosis Date    Abnormal Pap smear of cervix years ago    colpo done    Allergy     Chronic hip pain     Hidradenitis suppurativa 4/8/2016       Past Surgical History:   Procedure Laterality Date    BREAST BIOPSY Left 2015    BENIGN    COLONOSCOPY N/A 1/8/2016    Procedure: COLONOSCOPY;  Surgeon: Luis Santana Jr., MD;  Location: [facility];  Service: Endoscopy;  Laterality: N/A;    CYST REMOVAL      subcutaneous cyst to right chest wall    DILATION AND CURETTAGE OF UTERUS         family history includes Cancer (age of onset: 69) in her mother; Heart disease in her father; No Known Problems in her brother and brother.    Social History     Tobacco Use    Smoking status: Former     Packs/day: 0.50     Years: 30.00     Pack years: 15.00     Types: Cigarettes    Smokeless tobacco: Never   Substance Use Topics    Alcohol use: Yes     Alcohol/week: 8.0 standard drinks     Types: 8 Glasses of wine per week     Comment: weekends    Drug use: Yes     Types: Amphetamines       Review of Systems   Constitutional:  Negative for fatigue and fever.   HENT:  Negative for hearing loss, rhinorrhea and trouble swallowing.    Eyes:  Negative for discharge and visual disturbance.   Respiratory:  Negative for cough, chest tightness and wheezing.    Cardiovascular:  Negative for chest pain and palpitations.   Gastrointestinal:  Negative for abdominal pain, blood in stool, constipation, diarrhea and vomiting.   Endocrine: Negative for polydipsia, polyphagia and polyuria.   Genitourinary:  Negative for difficulty urinating, dysuria,  frequency, hematuria and menstrual problem.   Musculoskeletal:  Negative for arthralgias, back pain, gait problem, joint swelling and neck pain.   Skin:  Negative for rash.   Neurological:  Negative for seizures, weakness, numbness and headaches.   Psychiatric/Behavioral:  Negative for confusion, dysphoric mood and sleep disturbance.      Outpatient Encounter Medications as of 5/19/2023   Medication Sig Dispense Refill    adalimumab (HUMIRA,CF, PEN) 40 mg/0.4 mL PnKt Inject 0.4 mLs (40 mg total) into the skin every 14 (fourteen) days. 2 pen 11    atorvastatin (LIPITOR) 20 MG tablet Take 1 tablet (20 mg total) by mouth once daily. 90 tablet 3    azelastine (ASTELIN) 137 mcg (0.1 %) nasal spray 2 sprays (274 mcg total) by Nasal route 2 (two) times daily. 30 mL 11    busPIRone (BUSPAR) 5 MG Tab Take 1 tablet (5 mg total) by mouth 3 (three) times daily as needed (anxiety). 90 tablet 0    DULoxetine (CYMBALTA) 30 MG capsule TAKE 1 CAPSULE DAILY 90 capsule 3    estradioL (ESTRACE) 1 MG tablet Take 1 tablet (1 mg total) by mouth once daily. 90 tablet 3    indomethacin (INDOCIN) 50 MG capsule Take 1 capsule (50 mg total) by mouth 2 (two) times daily as needed. 60 capsule 0    medroxyPROGESTERone (PROVERA) 2.5 MG tablet Take 2.5 mg by mouth once daily.      omega-3 fatty acids/fish oil (FISH OIL-OMEGA-3 FATTY ACIDS) 300-1,000 mg capsule Take by mouth once daily.      tiZANidine (ZANAFLEX) 2 MG tablet Take 2 tablets (4 mg total) by mouth every 8 (eight) hours as needed. 30 tablet 0    [DISCONTINUED] predniSONE (DELTASONE) 10 MG tablet Take 2 tablets daily for 14 days and then 1 tablet daily (Patient not taking: Reported on 5/17/2023) 60 tablet 2    [DISCONTINUED] predniSONE (DELTASONE) 20 MG tablet Take 2 tablets daily for 10 days and then 1 tablet daily  and then 1/2 tablet daily 120 tablet 0     No facility-administered encounter medications on file as of 5/19/2023.        Review of patient's allergies indicates:  No Known  "Allergies    Physical Exam      Vital Signs  Pulse: 97  SpO2: 96 %  BP: 128/78  Pain Score: 0-No pain  Height and Weight  Height: 5' 8" (172.7 cm)  Weight: 80.8 kg (178 lb 3.2 oz)  BSA (Calculated - sq m): 1.97 sq meters  BMI (Calculated): 27.1  Weight in (lb) to have BMI = 25: 164.1]    Physical Exam  Vitals reviewed.   Constitutional:       Appearance: Normal appearance. She is normal weight.   HENT:      Head: Normocephalic and atraumatic.      Right Ear: Tympanic membrane normal.      Left Ear: Tympanic membrane normal.      Mouth/Throat:      Mouth: Mucous membranes are moist.      Pharynx: Oropharynx is clear.   Eyes:      Extraocular Movements: Extraocular movements intact.      Conjunctiva/sclera: Conjunctivae normal.      Pupils: Pupils are equal, round, and reactive to light.   Cardiovascular:      Rate and Rhythm: Normal rate and regular rhythm.      Pulses: Normal pulses.   Pulmonary:      Breath sounds: Normal breath sounds.   Abdominal:      General: Abdomen is flat. Bowel sounds are normal.      Palpations: Abdomen is soft.   Musculoskeletal:         General: Swelling: left first and second  MCP joints.      Cervical back: Normal range of motion.   Skin:     General: Skin is warm and dry.   Neurological:      General: No focal deficit present.      Mental Status: She is alert and oriented to person, place, and time.      Sensory: No sensory deficit.   Psychiatric:         Mood and Affect: Mood normal.         Behavior: Behavior normal.        Laboratory:  CBC:  No results for input(s): WBC, RBC, HGB, HCT, PLT, MCV, MCH, MCHC in the last 2160 hours.    CMP:  No results for input(s): GLU, CALCIUM, ALBUMIN, PROT, NA, K, CO2, CL, BUN, ALKPHOS, ALT, AST, BILITOT in the last 2160 hours.    Invalid input(s): CREATININ    URINALYSIS:  No results for input(s): COLORU, CLARITYU, SPECGRAV, PHUR, PROTEINUA, GLUCOSEU, BILIRUBINCON, BLOODU, WBCU, RBCU, BACTERIA, MUCUS, NITRITE, LEUKOCYTESUR, UROBILINOGEN, " HYALINECASTS in the last 2160 hours.   LIPIDS:  No results for input(s): TSH, HDL, CHOL, TRIG, LDLCALC, CHOLHDL, NONHDLCHOL, TOTALCHOLEST in the last 2160 hours.  TSH:  No results for input(s): TSH in the last 2160 hours.  A1C:  No results for input(s): HGBA1C in the last 2160 hours.    Radiology:      Assessment/Plan     Carly Carmen is a 57 y.o.female with:    1. Seronegative RA  -s/p rheum eval  -now on humira [id number] and steroid taper  -NSAIDS PRN  -symptoms improving  -c/w current regimen    2. Steroid induced weight gain  -has gained over 20lbs since last clinic visit  -on steroid taper  -denies any personal or family history of thyroid cancer, melanoma, pancreatic or GB disease  -will start on GLP-1 agonist  -med side effects explained to patient   -will titrate based on tolerability    3. PMS  -well controlled on current regimen    4. AGUSTO  -well controlled on current regimen    5. Centrilobular emphysema  -s/p Pulm eval  -quit smoking 4 years ago but still vapes  -continued vaping cessation advised  -asympomatic for now  -will monitor    Vaccines recommended :COVID, Flu and shingles, declined, will get [id number] in office today     Continue current medications and maintain follow up with specialists.        Dr Tessy Dewey MD  Internal Medicine  [facility]

## 2023-05-22 ENCOUNTER — PATIENT MESSAGE (OUTPATIENT)
Dept: FAMILY MEDICINE | Facility: CLINIC | Age: 58
End: 2023-05-22
Payer: COMMERCIAL

## 2023-05-22 RX ORDER — TIRZEPATIDE 5 MG/.5ML
5 INJECTION, SOLUTION SUBCUTANEOUS
Qty: 12 PEN | Refills: 3 | Status: SHIPPED | OUTPATIENT
Start: 2023-05-22 | End: 2023-08-23

## 2023-05-23 ENCOUNTER — PATIENT MESSAGE (OUTPATIENT)
Dept: FAMILY MEDICINE | Facility: CLINIC | Age: 58
End: 2023-05-23
Payer: COMMERCIAL

## 2023-06-07 ENCOUNTER — SPECIALTY PHARMACY (OUTPATIENT)
Dept: PHARMACY | Facility: CLINIC | Age: 58
End: 2023-06-07
Payer: COMMERCIAL

## 2023-06-07 NOTE — TELEPHONE ENCOUNTER
Incoming call regarding Humira refill, pt stated she is due to inject on 6/13/23, Pt stated she was at [facility] and to call her back in about 30 minutes to 1 hour and she will be home. Wouldn't let me complete refill due to stop sign. Routing to assigned pharmacist.

## 2023-06-09 NOTE — TELEPHONE ENCOUNTER
Specialty Pharmacy - Refill Coordination    Specialty Medication Orders Linked to Encounter      Flowsheet Row Most Recent Value   Medication #1 adalimumab (HUMIRA,CF, PEN) 40 mg/0.4 mL PnKt (Order#[id number], Rx#[id number])            Refill Questions - Documented Responses      Flowsheet Row Most Recent Value   Patient Availability and HIPAA Verification    Does patient want to proceed with activity? Yes   HIPAA/medical authority confirmed? Yes   Relationship to patient of person spoken to? Self   Refill Screening Questions    Changes to allergies? No   Changes to medications? No   New conditions since last clinic visit? No   Unplanned office visit, urgent care, ED, or hospital admission in the last 4 weeks? No   How does patient/caregiver feel medication is working? Very good   Financial problems or insurance changes? No   How many doses of your specialty medications were missed in the last 4 weeks? 0   Would patient like to speak to a pharmacist? No   When does the patient need to receive the medication? 06/13/23   Refill Delivery Questions    How will the patient receive the medication? Pickup   When does the patient need to receive the medication? 06/13/23   Address in [facility] confirmed and updated if neccessary? No   Expected Copay ($) 5   Is the patient able to afford the medication copay? Yes   Payment Method at pickup   Days supply of Refill 28   Supplies needed? Alcohol Swabs   Refill activity completed? Yes   Refill activity plan Refill scheduled   Shipment/Pickup Date: 06/12/23            Current Outpatient Medications   Medication Sig    adalimumab (HUMIRA,CF, PEN) 40 mg/0.4 mL PnKt Inject 0.4 mLs (40 mg total) into the skin every 14 (fourteen) days.    atorvastatin (LIPITOR) 20 MG tablet Take 1 tablet (20 mg total) by mouth once daily.    busPIRone (BUSPAR) 5 MG Tab Take 1 tablet (5 mg total) by mouth 3 (three) times daily as needed (anxiety).    estradioL (ESTRACE) 1 MG tablet Take 1 tablet  (1 mg total) by mouth once daily.    medroxyPROGESTERone (PROVERA) 2.5 MG tablet Take 2.5 mg by mouth once daily.    omega-3 fatty acids/fish oil (FISH OIL-OMEGA-3 FATTY ACIDS) 300-1,000 mg capsule Take by mouth once daily.    tirzepatide (MOUNJARO) 5 mg/0.5 mL PnIj Inject 5 mg into the skin every 7 days.    tiZANidine (ZANAFLEX) 2 MG tablet Take 2 tablets (4 mg total) by mouth every 8 (eight) hours as needed.   Last reviewed on 5/19/2023  5:14 PM by Tessy Dewey MD    Review of patient's allergies indicates:  No Known Allergies Last reviewed on  5/22/2023 9:07 AM by Tessy Dewey      Tasks added this encounter   6/13/2023 - Pickup Reminder   Tasks due within next 3 months   No tasks due.     Marcus Barreto, PharmD  [facility]  [address] [place] [id number]  Phone: [phone]  Fax: [phone]

## 2023-06-16 ENCOUNTER — PATIENT MESSAGE (OUTPATIENT)
Dept: PODIATRY | Facility: CLINIC | Age: 58
End: 2023-06-16
Payer: COMMERCIAL

## 2023-06-16 RX ORDER — INDOMETHACIN 50 MG/1
CAPSULE ORAL
Qty: 60 CAPSULE | Refills: 3 | Status: SHIPPED | OUTPATIENT
Start: 2023-06-16 | End: 2023-08-12 | Stop reason: SDUPTHER

## 2023-06-16 RX ORDER — TIZANIDINE 2 MG/1
TABLET ORAL
Qty: 90 TABLET | Refills: 3 | Status: SHIPPED | OUTPATIENT
Start: 2023-06-16 | End: 2023-08-12 | Stop reason: SDUPTHER

## 2023-07-03 ENCOUNTER — SPECIALTY PHARMACY (OUTPATIENT)
Dept: PHARMACY | Facility: CLINIC | Age: 58
End: 2023-07-03
Payer: COMMERCIAL

## 2023-07-03 NOTE — TELEPHONE ENCOUNTER
Specialty Pharmacy - Refill Coordination    Specialty Medication Orders Linked to Encounter      Flowsheet Row Most Recent Value   Medication #1 adalimumab (HUMIRA,CF, PEN) 40 mg/0.4 mL PnKt (Order#[id number], Rx#[id number])            Refill Questions - Documented Responses      Flowsheet Row Most Recent Value   Patient Availability and HIPAA Verification    Does patient want to proceed with activity? Yes   HIPAA/medical authority confirmed? Yes   Relationship to patient of person spoken to? Self   Refill Screening Questions    Changes to allergies? No   Changes to medications? No   New conditions since last clinic visit? No   Unplanned office visit, urgent care, ED, or hospital admission in the last 4 weeks? No   How does patient/caregiver feel medication is working? Good   Financial problems or insurance changes? No   How many doses of your specialty medications were missed in the last 4 weeks? 0   Would patient like to speak to a pharmacist? No   When does the patient need to receive the medication? 07/11/23   Refill Delivery Questions    How will the patient receive the medication? MEDRx   When does the patient need to receive the medication? 07/11/23   Shipping Address Home   Address in [facility] confirmed and updated if neccessary? Yes   Expected Copay ($) 5   Is the patient able to afford the medication copay? Yes   Payment Method CC on file   Days supply of Refill 28   Supplies needed? No supplies needed   Refill activity completed? Yes   Refill activity plan Refill scheduled   Shipment/Pickup Date: 07/06/23            Current Outpatient Medications   Medication Sig    adalimumab (HUMIRA,CF, PEN) 40 mg/0.4 mL PnKt Inject 0.4 mLs (40 mg total) into the skin every 14 (fourteen) days.    atorvastatin (LIPITOR) 20 MG tablet Take 1 tablet (20 mg total) by mouth once daily.    busPIRone (BUSPAR) 5 MG Tab Take 1 tablet (5 mg total) by mouth 3 (three) times daily as needed (anxiety).    estradioL (ESTRACE)  1 MG tablet Take 1 tablet (1 mg total) by mouth once daily.    indomethacin (INDOCIN) 50 MG capsule TAKE 1 CAPSULE TWICE A DAY AS NEEDED    medroxyPROGESTERone (PROVERA) 2.5 MG tablet Take 2.5 mg by mouth once daily.    omega-3 fatty acids/fish oil (FISH OIL-OMEGA-3 FATTY ACIDS) 300-1,000 mg capsule Take by mouth once daily.    tirzepatide (MOUNJARO) 5 mg/0.5 mL PnIj Inject 5 mg into the skin every 7 days.    tiZANidine (ZANAFLEX) 2 MG tablet TAKE 2 TABLETS EVERY 8 HOURS AS NEEDED   Last reviewed on 5/19/2023  5:14 PM by Tessy Dewey MD    Review of patient's allergies indicates:  No Known Allergies Last reviewed on  5/22/2023 9:07 AM by Tessy Dewey      Tasks added this encounter   No tasks added.   Tasks due within next 3 months   7/6/2023 - Refill Coordination Outreach (1 time occurrence)     Babs Haskins, PharmD  [facility]  [address] [place] [id number]  Phone: [phone]  Fax: [phone]

## 2023-07-12 ENCOUNTER — PATIENT MESSAGE (OUTPATIENT)
Dept: OBSTETRICS AND GYNECOLOGY | Facility: CLINIC | Age: 58
End: 2023-07-12

## 2023-07-12 ENCOUNTER — OFFICE VISIT (OUTPATIENT)
Dept: OBSTETRICS AND GYNECOLOGY | Facility: CLINIC | Age: 58
End: 2023-07-12
Payer: COMMERCIAL

## 2023-07-12 VITALS — BODY MASS INDEX: 27.6 KG/M2 | SYSTOLIC BLOOD PRESSURE: 130 MMHG | WEIGHT: 181.5 LBS | DIASTOLIC BLOOD PRESSURE: 82 MMHG

## 2023-07-12 DIAGNOSIS — Z79.890 HORMONE REPLACEMENT THERAPY (HRT): ICD-10-CM

## 2023-07-12 DIAGNOSIS — Z01.419 WELL WOMAN EXAM WITH ROUTINE GYNECOLOGICAL EXAM: Primary | ICD-10-CM

## 2023-07-12 PROCEDURE — 3079F DIAST BP 80-89 MM HG: CPT | Mod: CPTII,S$GLB,, | Performed by: STUDENT IN AN ORGANIZED HEALTH CARE EDUCATION/TRAINING PROGRAM

## 2023-07-12 PROCEDURE — 3079F PR MOST RECENT DIASTOLIC BLOOD PRESSURE 80-89 MM HG: ICD-10-PCS | Mod: CPTII,S$GLB,, | Performed by: STUDENT IN AN ORGANIZED HEALTH CARE EDUCATION/TRAINING PROGRAM

## 2023-07-12 PROCEDURE — 3075F SYST BP GE 130 - 139MM HG: CPT | Mod: CPTII,S$GLB,, | Performed by: STUDENT IN AN ORGANIZED HEALTH CARE EDUCATION/TRAINING PROGRAM

## 2023-07-12 PROCEDURE — 99999 PR PBB SHADOW E&M-EST. PATIENT-LVL III: CPT | Mod: PBBFAC,,, | Performed by: STUDENT IN AN ORGANIZED HEALTH CARE EDUCATION/TRAINING PROGRAM

## 2023-07-12 PROCEDURE — 99396 PR PREVENTIVE VISIT,EST,40-64: ICD-10-PCS | Mod: S$GLB,,, | Performed by: STUDENT IN AN ORGANIZED HEALTH CARE EDUCATION/TRAINING PROGRAM

## 2023-07-12 PROCEDURE — 99999 PR PBB SHADOW E&M-EST. PATIENT-LVL III: ICD-10-PCS | Mod: PBBFAC,,, | Performed by: STUDENT IN AN ORGANIZED HEALTH CARE EDUCATION/TRAINING PROGRAM

## 2023-07-12 PROCEDURE — 3008F PR BODY MASS INDEX (BMI) DOCUMENTED: ICD-10-PCS | Mod: CPTII,S$GLB,, | Performed by: STUDENT IN AN ORGANIZED HEALTH CARE EDUCATION/TRAINING PROGRAM

## 2023-07-12 PROCEDURE — 1159F MED LIST DOCD IN RCRD: CPT | Mod: CPTII,S$GLB,, | Performed by: STUDENT IN AN ORGANIZED HEALTH CARE EDUCATION/TRAINING PROGRAM

## 2023-07-12 PROCEDURE — 3044F PR MOST RECENT HEMOGLOBIN A1C LEVEL <7.0%: ICD-10-PCS | Mod: CPTII,S$GLB,, | Performed by: STUDENT IN AN ORGANIZED HEALTH CARE EDUCATION/TRAINING PROGRAM

## 2023-07-12 PROCEDURE — 1159F PR MEDICATION LIST DOCUMENTED IN MEDICAL RECORD: ICD-10-PCS | Mod: CPTII,S$GLB,, | Performed by: STUDENT IN AN ORGANIZED HEALTH CARE EDUCATION/TRAINING PROGRAM

## 2023-07-12 PROCEDURE — 3075F PR MOST RECENT SYSTOLIC BLOOD PRESS GE 130-139MM HG: ICD-10-PCS | Mod: CPTII,S$GLB,, | Performed by: STUDENT IN AN ORGANIZED HEALTH CARE EDUCATION/TRAINING PROGRAM

## 2023-07-12 PROCEDURE — 99396 PREV VISIT EST AGE 40-64: CPT | Mod: S$GLB,,, | Performed by: STUDENT IN AN ORGANIZED HEALTH CARE EDUCATION/TRAINING PROGRAM

## 2023-07-12 PROCEDURE — 3044F HG A1C LEVEL LT 7.0%: CPT | Mod: CPTII,S$GLB,, | Performed by: STUDENT IN AN ORGANIZED HEALTH CARE EDUCATION/TRAINING PROGRAM

## 2023-07-12 PROCEDURE — 3008F BODY MASS INDEX DOCD: CPT | Mod: CPTII,S$GLB,, | Performed by: STUDENT IN AN ORGANIZED HEALTH CARE EDUCATION/TRAINING PROGRAM

## 2023-07-12 RX ORDER — ESTERIFIED ESTROGEN AND METHYLTESTOSTERONE .625; 1.25 MG/1; MG/1
1 TABLET ORAL DAILY
Qty: 90 TABLET | Refills: 3 | Status: SHIPPED | OUTPATIENT
Start: 2023-07-12 | End: 2023-12-26

## 2023-07-12 NOTE — PROGRESS NOTES
CC: Well woman exam    HPI:  Carly Carmen is a 57 y.o. female  presents for a well woman exam.  She reports continued weight gain and hair loss in menopause, has been on estrogen+progesterone       Patient history:   Past Medical History:   Diagnosis Date    Abnormal Pap smear of cervix years ago    colpo done    Allergy     Chronic hip pain     Hidradenitis suppurativa 2016     Past Surgical History:   Procedure Laterality Date    BREAST BIOPSY Left 2015    BENIGN    COLONOSCOPY N/A 2016    Procedure: COLONOSCOPY;  Surgeon: Luis Santana Jr., MD;  Location: [facility];  Service: Endoscopy;  Laterality: N/A;    CYST REMOVAL      subcutaneous cyst to right chest wall    DILATION AND CURETTAGE OF UTERUS       OB History    Para Term  AB Living   3 1 1   2 1   SAB IAB Ectopic Multiple Live Births   2       1      # Outcome Date GA Lbr Regino/2nd Weight Sex Delivery Anes PTL Lv   3 SAB            2 SAB            1 Term      Vag-Spont   JOSE       GYN  Menopausal: Yes  History of abnormal paps: DENIES  Abnormal or postmenopausal bleeding: DENIES  History of abnormal mammograms: has biopsy years ago with subsequent normal mammograms     Family history of breast or ovarian cancer: DENIES  Any breast masses, pain, skin changes, or nipple discharge: DENIES  Possible recent STD exposure: denies  Contraception: N/A    Pap: 2021, NILM/HPV(-)  Mammogram:  ordered      Family History   Problem Relation Age of Onset    Cancer Mother 69        Lung Cancer with Brain Mets -  72    Heart disease Father         defibrillator    No Known Problems Brother     No Known Problems Brother     Breast cancer Neg Hx     Colon cancer Neg Hx     Ovarian cancer Neg Hx      Social History     Tobacco Use    Smoking status: Former     Packs/day: 0.50     Years: 30.00     Pack years: 15.00     Types: Cigarettes    Smokeless tobacco: Never   Substance Use Topics    Alcohol use: Yes     Alcohol/week: 8.0  standard drinks     Types: 8 Glasses of wine per week     Comment: weekends    Drug use: Yes     Types: Amphetamines     Allergies: Patient has no known allergies.    Current Outpatient Medications:     adalimumab (HUMIRA,CF, PEN) 40 mg/0.4 mL PnKt, Inject 0.4 mLs (40 mg total) into the skin every 14 (fourteen) days., Disp: 2 pen, Rfl: 11    atorvastatin (LIPITOR) 20 MG tablet, Take 1 tablet (20 mg total) by mouth once daily., Disp: 90 tablet, Rfl: 3    busPIRone (BUSPAR) 5 MG Tab, Take 1 tablet (5 mg total) by mouth 3 (three) times daily as needed (anxiety)., Disp: 90 tablet, Rfl: 0    estrogens,esterified,-methyltestosterone 0.625-1.25mg (ESTRATEST HS) per tablet, Take 1 tablet by mouth once daily., Disp: 90 tablet, Rfl: 3    indomethacin (INDOCIN) 50 MG capsule, TAKE 1 CAPSULE TWICE A DAY AS NEEDED, Disp: 60 capsule, Rfl: 3    medroxyPROGESTERone (PROVERA) 2.5 MG tablet, Take 2.5 mg by mouth once daily., Disp: , Rfl:     omega-3 fatty acids/fish oil (FISH OIL-OMEGA-3 FATTY ACIDS) 300-1,000 mg capsule, Take by mouth once daily., Disp: , Rfl:     tirzepatide (MOUNJARO) 5 mg/0.5 mL PnIj, Inject 5 mg into the skin every 7 days., Disp: 12 pen, Rfl: 3    tiZANidine (ZANAFLEX) 2 MG tablet, TAKE 2 TABLETS EVERY 8 HOURS AS NEEDED, Disp: 90 tablet, Rfl: 3       ROS:  GENERAL: Denies weight loss. Feeling well overall.   SKIN: Denies rash or lesions.   HEAD: Denies head injury or headache.   NODES: Denies enlarged lymph nodes.   CHEST: Denies chest pain or shortness of breath.   CARDIOVASCULAR: Denies palpitations or left sided chest pain.   ABDOMEN: No abdominal pain, constipation, diarrhea, nausea, vomiting or rectal bleeding.   URINARY: No frequency, dysuria, hematuria, or burning on urination.  REPRODUCTIVE: See HPI.   BREASTS: The patient performs breast self-examination and denies pain, lumps, or nipple discharge.   HEMATOLOGIC: No easy bruisability or excessive bleeding.  MUSCULOSKELETAL: Denies joint pain or  swelling.   NEUROLOGIC: Denies syncope or weakness.   PSYCHIATRIC: Denies depression, anxiety or mood swings.    Objective:   /82   Wt 82.3 kg (181 lb 8 oz)   BMI 27.60 kg/m²       Physical Exam:  APPEARANCE: Well nourished, well developed, in no acute distress.  AFFECT: WNL, alert and oriented x 3  SKIN: No acne or hirsutism  NECK: Neck symmetric without masses or thyromegaly  NODES: No inguinal, cervical, axillary, or femoral lymph node enlargement  CHEST: Good respiratory effect  ABDOMEN: Soft.  No tenderness or masses.  No hepatosplenomegaly.  No hernias.  BREASTS: Symmetrical, no skin changes or visible lesions.  No palpable masses, nipple discharge bilaterally.  PELVIC: Normal external genitalia without lesions.  Normal hair distribution.  Small perineal body, normal urethral meatus.  Vagina atrophic without lesions or discharge.  Cervix pink, without lesions, discharge or tenderness.  No significant cystocele or rectocele.  Bimanual exam shows uterus to be normal size, regular, mobile and nontender.  Adnexa without masses or tenderness.  EXTREMITIES: No edema.    ASSESSMENT AND PLAN  1. Well woman exam with routine gynecological exam        2. Hormone replacement therapy (HRT)  estrogens,esterified,-methyltestosterone 0.625-1.25mg (ESTRATEST HS) per tablet          Annual exam  Breast and pelvic exam: WNL  Patient counseled on ASCCP guidelines for cervical cytology screening  Cervical screening: UTD due by 2026  Patient counseled on current recommendations for breast cancer screening  Mammogram screening: scheduled  STD testing: not requested today   HRT: discussed trial of estrogen+testosterone combination to address continued symptoms, continue progesterone for endometrial protection   Osteoporosis screening at 65  Tobacco cessation counseling: former smoker quit     She was counseled to follow up with her PCP for other routine health maintenance      Follow up in about 1 year (around  7/12/2024).      Stephanie Heaney, MD OBGYN [facility]

## 2023-07-16 DIAGNOSIS — I70.0 AORTIC ATHEROSCLEROSIS: ICD-10-CM

## 2023-07-16 DIAGNOSIS — E78.2 MIXED HYPERLIPIDEMIA: ICD-10-CM

## 2023-07-17 RX ORDER — ATORVASTATIN CALCIUM 20 MG/1
TABLET, FILM COATED ORAL
Qty: 90 TABLET | Refills: 3 | Status: SHIPPED | OUTPATIENT
Start: 2023-07-17

## 2023-07-17 NOTE — TELEPHONE ENCOUNTER
Refill Routing Note   Medication(s) are not appropriate for processing by [facility] for the following reason(s):      Non-participating provider    [facility] action(s):  Route Care Due:  None identified              Appointments  past 12m or future 3m with PCP    Date Provider   Last Visit   5/19/2023 Tessy Dewey MD   Next Visit   8/23/2023 Tessy Dewey MD   ED visits in past 90 days: 0        Note composed:2:23 PM 07/17/2023

## 2023-07-22 ENCOUNTER — PATIENT MESSAGE (OUTPATIENT)
Dept: RHEUMATOLOGY | Facility: CLINIC | Age: 58
End: 2023-07-22
Payer: COMMERCIAL

## 2023-07-24 ENCOUNTER — PATIENT MESSAGE (OUTPATIENT)
Dept: PULMONOLOGY | Facility: CLINIC | Age: 58
End: 2023-07-24
Payer: COMMERCIAL

## 2023-07-27 ENCOUNTER — SPECIALTY PHARMACY (OUTPATIENT)
Dept: PHARMACY | Facility: CLINIC | Age: 58
End: 2023-07-27
Payer: COMMERCIAL

## 2023-07-27 NOTE — TELEPHONE ENCOUNTER
Specialty Pharmacy - Refill Coordination    Specialty Medication Orders Linked to Encounter      Flowsheet Row Most Recent Value   Medication #1 adalimumab (HUMIRA,CF, PEN) 40 mg/0.4 mL PnKt (Order#[id number], Rx#[id number])            Refill Questions - Documented Responses      Flowsheet Row Most Recent Value   Patient Availability and HIPAA Verification    Does patient want to proceed with activity? Yes   HIPAA/medical authority confirmed? Yes   Relationship to patient of person spoken to? Self   Refill Screening Questions    Changes to allergies? No   Changes to medications? No   New conditions since last clinic visit? No   Unplanned office visit, urgent care, ED, or hospital admission in the last 4 weeks? No   How does patient/caregiver feel medication is working? Good   Financial problems or insurance changes? No   How many doses of your specialty medications were missed in the last 4 weeks? 0   Would patient like to speak to a pharmacist? No   When does the patient need to receive the medication? 08/06/23   Refill Delivery Questions    How will the patient receive the medication? MEDRx   When does the patient need to receive the medication? 08/06/23   Shipping Address Home   Address in [facility] confirmed and updated if neccessary? Yes   Expected Copay ($) 5   Is the patient able to afford the medication copay? Yes   Payment Method CC on file   Days supply of Refill 28   Refill activity completed? Yes   Refill activity plan Refill scheduled   Shipment/Pickup Date: 08/02/23            Current Outpatient Medications   Medication Sig    adalimumab (HUMIRA,CF, PEN) 40 mg/0.4 mL PnKt Inject 0.4 mLs (40 mg total) into the skin every 14 (fourteen) days.    atorvastatin (LIPITOR) 20 MG tablet TAKE 1 TABLET DAILY    busPIRone (BUSPAR) 5 MG Tab Take 1 tablet (5 mg total) by mouth 3 (three) times daily as needed (anxiety).    estrogens,esterified,-methyltestosterone 0.625-1.25mg (ESTRATEST HS) per tablet Take 1  tablet by mouth once daily.    indomethacin (INDOCIN) 50 MG capsule TAKE 1 CAPSULE TWICE A DAY AS NEEDED    medroxyPROGESTERone (PROVERA) 2.5 MG tablet Take 2.5 mg by mouth once daily.    omega-3 fatty acids/fish oil (FISH OIL-OMEGA-3 FATTY ACIDS) 300-1,000 mg capsule Take by mouth once daily.    tirzepatide (MOUNJARO) 5 mg/0.5 mL PnIj Inject 5 mg into the skin every 7 days.    tiZANidine (ZANAFLEX) 2 MG tablet TAKE 2 TABLETS EVERY 8 HOURS AS NEEDED   Last reviewed on 7/12/2023  1:30 PM by Jacinta Jerome MA    Review of patient's allergies indicates:  No Known Allergies Last reviewed on  7/12/2023 1:30 PM by Jacinta Jerome      Tasks added this encounter   No tasks added.   Tasks due within next 3 months   No tasks due.     [facility]ty [facility]  [address] [place] [id number]  Phone: [phone]  Fax: [phone]

## 2023-08-09 ENCOUNTER — PATIENT MESSAGE (OUTPATIENT)
Dept: PHARMACY | Facility: CLINIC | Age: 58
End: 2023-08-09
Payer: COMMERCIAL

## 2023-08-09 ENCOUNTER — SPECIALTY PHARMACY (OUTPATIENT)
Dept: PHARMACY | Facility: CLINIC | Age: 58
End: 2023-08-09
Payer: COMMERCIAL

## 2023-08-09 DIAGNOSIS — M06.9 RHEUMATOID ARTHRITIS FLARE: Primary | ICD-10-CM

## 2023-08-09 RX ORDER — ADALIMUMAB 40MG/0.4ML
40 KIT SUBCUTANEOUS
Qty: 1 PEN | Refills: 0 | Status: SHIPPED | OUTPATIENT
Start: 2023-08-09 | End: 2023-11-01

## 2023-08-09 NOTE — TELEPHONE ENCOUNTER
Incoming call from patient. Reports during Humira injection 8/8 she does not believe she received the full dose. Transferred to assigned [facility].

## 2023-08-10 ENCOUNTER — PATIENT MESSAGE (OUTPATIENT)
Dept: RHEUMATOLOGY | Facility: CLINIC | Age: 58
End: 2023-08-10
Payer: COMMERCIAL

## 2023-08-14 RX ORDER — TIZANIDINE 2 MG/1
2 TABLET ORAL 2 TIMES DAILY PRN
Qty: 90 TABLET | Refills: 3 | Status: SHIPPED | OUTPATIENT
Start: 2023-08-14 | End: 2023-08-23

## 2023-08-14 RX ORDER — INDOMETHACIN 50 MG/1
50 CAPSULE ORAL 2 TIMES DAILY PRN
Qty: 60 CAPSULE | Refills: 3 | Status: SHIPPED | OUTPATIENT
Start: 2023-08-14

## 2023-08-23 ENCOUNTER — OFFICE VISIT (OUTPATIENT)
Dept: FAMILY MEDICINE | Facility: CLINIC | Age: 58
End: 2023-08-23
Payer: COMMERCIAL

## 2023-08-23 VITALS
BODY MASS INDEX: 27.32 KG/M2 | SYSTOLIC BLOOD PRESSURE: 126 MMHG | HEART RATE: 84 BPM | DIASTOLIC BLOOD PRESSURE: 80 MMHG | OXYGEN SATURATION: 97 % | HEIGHT: 68 IN | WEIGHT: 180.25 LBS

## 2023-08-23 DIAGNOSIS — M06.00 SERONEGATIVE RHEUMATOID ARTHRITIS: ICD-10-CM

## 2023-08-23 DIAGNOSIS — F51.04 CHRONIC INSOMNIA: ICD-10-CM

## 2023-08-23 DIAGNOSIS — Z79.620 LONG TERM (CURRENT) USE OF IMMUNOSUPPRESSIVE BIOLOGIC: ICD-10-CM

## 2023-08-23 DIAGNOSIS — M54.31 SCIATICA OF RIGHT SIDE: ICD-10-CM

## 2023-08-23 DIAGNOSIS — N95.1 POST MENOPAUSAL SYNDROME: ICD-10-CM

## 2023-08-23 DIAGNOSIS — Z00.00 ANNUAL PHYSICAL EXAM: Primary | ICD-10-CM

## 2023-08-23 PROCEDURE — 3074F PR MOST RECENT SYSTOLIC BLOOD PRESSURE < 130 MM HG: ICD-10-PCS | Mod: CPTII,S$GLB,, | Performed by: STUDENT IN AN ORGANIZED HEALTH CARE EDUCATION/TRAINING PROGRAM

## 2023-08-23 PROCEDURE — 3044F HG A1C LEVEL LT 7.0%: CPT | Mod: CPTII,S$GLB,, | Performed by: STUDENT IN AN ORGANIZED HEALTH CARE EDUCATION/TRAINING PROGRAM

## 2023-08-23 PROCEDURE — 3079F DIAST BP 80-89 MM HG: CPT | Mod: CPTII,S$GLB,, | Performed by: STUDENT IN AN ORGANIZED HEALTH CARE EDUCATION/TRAINING PROGRAM

## 2023-08-23 PROCEDURE — 99396 PREV VISIT EST AGE 40-64: CPT | Mod: S$GLB,,, | Performed by: STUDENT IN AN ORGANIZED HEALTH CARE EDUCATION/TRAINING PROGRAM

## 2023-08-23 PROCEDURE — 1160F PR REVIEW ALL MEDS BY PRESCRIBER/CLIN PHARMACIST DOCUMENTED: ICD-10-PCS | Mod: CPTII,S$GLB,, | Performed by: STUDENT IN AN ORGANIZED HEALTH CARE EDUCATION/TRAINING PROGRAM

## 2023-08-23 PROCEDURE — 99999 PR PBB SHADOW E&M-EST. PATIENT-LVL IV: CPT | Mod: PBBFAC,,, | Performed by: STUDENT IN AN ORGANIZED HEALTH CARE EDUCATION/TRAINING PROGRAM

## 2023-08-23 PROCEDURE — 3008F BODY MASS INDEX DOCD: CPT | Mod: CPTII,S$GLB,, | Performed by: STUDENT IN AN ORGANIZED HEALTH CARE EDUCATION/TRAINING PROGRAM

## 2023-08-23 PROCEDURE — 3044F PR MOST RECENT HEMOGLOBIN A1C LEVEL <7.0%: ICD-10-PCS | Mod: CPTII,S$GLB,, | Performed by: STUDENT IN AN ORGANIZED HEALTH CARE EDUCATION/TRAINING PROGRAM

## 2023-08-23 PROCEDURE — 99999 PR PBB SHADOW E&M-EST. PATIENT-LVL IV: ICD-10-PCS | Mod: PBBFAC,,, | Performed by: STUDENT IN AN ORGANIZED HEALTH CARE EDUCATION/TRAINING PROGRAM

## 2023-08-23 PROCEDURE — 3008F PR BODY MASS INDEX (BMI) DOCUMENTED: ICD-10-PCS | Mod: CPTII,S$GLB,, | Performed by: STUDENT IN AN ORGANIZED HEALTH CARE EDUCATION/TRAINING PROGRAM

## 2023-08-23 PROCEDURE — 1160F RVW MEDS BY RX/DR IN RCRD: CPT | Mod: CPTII,S$GLB,, | Performed by: STUDENT IN AN ORGANIZED HEALTH CARE EDUCATION/TRAINING PROGRAM

## 2023-08-23 PROCEDURE — 1159F MED LIST DOCD IN RCRD: CPT | Mod: CPTII,S$GLB,, | Performed by: STUDENT IN AN ORGANIZED HEALTH CARE EDUCATION/TRAINING PROGRAM

## 2023-08-23 PROCEDURE — 3079F PR MOST RECENT DIASTOLIC BLOOD PRESSURE 80-89 MM HG: ICD-10-PCS | Mod: CPTII,S$GLB,, | Performed by: STUDENT IN AN ORGANIZED HEALTH CARE EDUCATION/TRAINING PROGRAM

## 2023-08-23 PROCEDURE — 1159F PR MEDICATION LIST DOCUMENTED IN MEDICAL RECORD: ICD-10-PCS | Mod: CPTII,S$GLB,, | Performed by: STUDENT IN AN ORGANIZED HEALTH CARE EDUCATION/TRAINING PROGRAM

## 2023-08-23 PROCEDURE — 99396 PR PREVENTIVE VISIT,EST,40-64: ICD-10-PCS | Mod: S$GLB,,, | Performed by: STUDENT IN AN ORGANIZED HEALTH CARE EDUCATION/TRAINING PROGRAM

## 2023-08-23 PROCEDURE — 3074F SYST BP LT 130 MM HG: CPT | Mod: CPTII,S$GLB,, | Performed by: STUDENT IN AN ORGANIZED HEALTH CARE EDUCATION/TRAINING PROGRAM

## 2023-08-23 RX ORDER — TRAZODONE HYDROCHLORIDE 50 MG/1
50 TABLET ORAL NIGHTLY
Qty: 90 TABLET | Refills: 3 | Status: SHIPPED | OUTPATIENT
Start: 2023-08-23 | End: 2023-09-20

## 2023-08-23 RX ORDER — DULOXETIN HYDROCHLORIDE 30 MG/1
CAPSULE, DELAYED RELEASE ORAL
COMMUNITY
Start: 2023-07-30

## 2023-08-23 NOTE — PROGRESS NOTES
[facility] Note    Chief Complaint      Chief Complaint   Patient presents with    Follow-up on chronic conditions     History of Present Illness      Carly Carmen is a 57 y.o. female with HLD, PMS on HRT, anxiety and chronic right hip/leg pain with sciatica 2/2 right lumbar disc radiculitis, centrilobular emphysema and recurrent joints pain recently diagnosed with seronegative rheumatoid arthritis now on humira [id number] for follow up and annual physicals. She endorses remarkable improvement in recurrent joint pain . She has been having difficulty initiating and maintaining sleep that is why she takes the tizanidine nightly.  She denies any CP, SOB, leg swelling and her joint swelling are improving. She has also been evaluated by pulmonology . She described her mood to be great today.     Rheum : Dr Syd Jarquin MD  Ortho : Dr Mirza Singletary  Gyn: Dr Ofelia Everett  Pulm: Dr Joana Zavala MD    Problem List Addressed This Visit:    1. Annual physical exam  -     TSH; Future; Expected date: 08/23/2023  -     LIPID PANEL; Future; Expected date: 08/23/2023    2. Chronic insomnia  -     traZODone (DESYREL) 50 MG tablet; Take 1 tablet (50 mg total) by mouth every evening.  Dispense: 90 tablet; Refill: 3    3. Seronegative rheumatoid arthritis    4. Long term (current) use of immunosuppressive biologic    5. Sciatica of right side    6. Post menopausal syndrome        Health Maintenance   Topic Date Due    Shingles Vaccine (1 of 2) 12/08/2023 (Originally 9/21/1984)    Mammogram  07/31/2024    High Dose Statin  08/23/2024    Colorectal Cancer Screening  01/08/2026    TETANUS VACCINE  12/01/2026    Lipid Panel  07/01/2027    Hepatitis C Screening  Completed       Past Medical History:   Diagnosis Date    Abnormal Pap smear of cervix years ago    colpo done    Allergy     Chronic hip pain     Hidradenitis suppurativa 4/8/2016       Past Surgical History:   Procedure Laterality Date     BREAST BIOPSY Left 2015    BENIGN    COLONOSCOPY N/A 1/8/2016    Procedure: COLONOSCOPY;  Surgeon: Luis Santana Jr., MD;  Location: [facility];  Service: Endoscopy;  Laterality: N/A;    CYST REMOVAL      subcutaneous cyst to right chest wall    DILATION AND CURETTAGE OF UTERUS         family history includes Cancer (age of onset: 69) in her mother; Heart disease in her father; No Known Problems in her brother and brother.    Social History     Tobacco Use    Smoking status: Former     Current packs/day: 0.50     Average packs/day: 0.5 packs/day for 30.0 years (15.0 ttl pk-yrs)     Types: Cigarettes, Vaping with nicotine     Passive exposure: Current    Smokeless tobacco: Never   Substance Use Topics    Alcohol use: Yes     Alcohol/week: 8.0 standard drinks of alcohol     Types: 8 Glasses of wine per week     Comment: weekends    Drug use: Yes     Types: Amphetamines       Review of Systems   Constitutional:  Negative for fatigue and fever.   HENT:  Negative for hearing loss, rhinorrhea and trouble swallowing.    Eyes:  Negative for discharge and visual disturbance.   Respiratory:  Negative for cough, chest tightness and wheezing.    Cardiovascular:  Negative for chest pain and palpitations.   Gastrointestinal:  Negative for abdominal pain, blood in stool, constipation, diarrhea and vomiting.   Endocrine: Negative for polydipsia, polyphagia and polyuria.   Genitourinary:  Negative for difficulty urinating, dysuria, frequency, hematuria and menstrual problem.   Musculoskeletal:  Negative for arthralgias, back pain, gait problem, joint swelling and neck pain.   Skin:  Negative for rash.   Neurological:  Negative for seizures, weakness, numbness and headaches.   Psychiatric/Behavioral:  Negative for confusion, dysphoric mood and sleep disturbance.        Outpatient Encounter Medications as of 8/23/2023   Medication Sig Dispense Refill    adalimumab (HUMIRA,CF, PEN) 40 mg/0.4 mL PnKt Inject 0.4 mLs (40 mg total)  "into the skin every 14 (fourteen) days. 2 pen 11    adalimumab (HUMIRA,CF, PEN) 40 mg/0.4 mL PnKt Inject 0.4 mLs (40 mg total) into the skin every 14 (fourteen) days. 1 pen 0    atorvastatin (LIPITOR) 20 MG tablet TAKE 1 TABLET DAILY 90 tablet 3    busPIRone (BUSPAR) 5 MG Tab Take 1 tablet (5 mg total) by mouth 3 (three) times daily as needed (anxiety). 90 tablet 0    DULoxetine (CYMBALTA) 30 MG capsule       estrogens,esterified,-methyltestosterone 0.625-1.25mg (ESTRATEST HS) per tablet Take 1 tablet by mouth once daily. 90 tablet 3    indomethacin (INDOCIN) 50 MG capsule Take 1 capsule (50 mg total) by mouth 2 (two) times daily as needed. 60 capsule 3    medroxyPROGESTERone (PROVERA) 2.5 MG tablet Take 2.5 mg by mouth once daily.      omega-3 fatty acids/fish oil (FISH OIL-OMEGA-3 FATTY ACIDS) 300-1,000 mg capsule Take by mouth once daily.      [DISCONTINUED] tiZANidine (ZANAFLEX) 2 MG tablet Take 1 tablet (2 mg total) by mouth 2 (two) times daily as needed. 90 tablet 3    traZODone (DESYREL) 50 MG tablet Take 1 tablet (50 mg total) by mouth every evening. 90 tablet 3    [DISCONTINUED] indomethacin (INDOCIN) 50 MG capsule TAKE 1 CAPSULE TWICE A DAY AS NEEDED 60 capsule 3    [DISCONTINUED] tirzepatide (MOUNJARO) 5 mg/0.5 mL PnIj Inject 5 mg into the skin every 7 days. (Patient not taking: Reported on 8/23/2023) 12 pen 3    [DISCONTINUED] tiZANidine (ZANAFLEX) 2 MG tablet TAKE 2 TABLETS EVERY 8 HOURS AS NEEDED 90 tablet 3     No facility-administered encounter medications on file as of 8/23/2023.        Review of patient's allergies indicates:  No Known Allergies    Physical Exam      Vital Signs  Pulse: 84  SpO2: 97 %  BP: 126/80  BP Location: Left arm  Patient Position: Sitting  Pain Score: 0-No pain  Height and Weight  Height: 5' 8" (172.7 cm)  Weight: 81.8 kg (180 lb 3.6 oz)  BSA (Calculated - sq m): 1.98 sq meters  BMI (Calculated): 27.4  Weight in (lb) to have BMI = 25: 164.1]    Physical Exam  Vitals " "reviewed.   Constitutional:       Appearance: Normal appearance. She is normal weight.   HENT:      Head: Normocephalic and atraumatic.      Right Ear: Tympanic membrane normal.      Left Ear: Tympanic membrane normal.      Mouth/Throat:      Mouth: Mucous membranes are moist.      Pharynx: Oropharynx is clear.   Eyes:      Extraocular Movements: Extraocular movements intact.      Conjunctiva/sclera: Conjunctivae normal.      Pupils: Pupils are equal, round, and reactive to light.   Cardiovascular:      Rate and Rhythm: Normal rate and regular rhythm.      Pulses: Normal pulses.   Pulmonary:      Breath sounds: Normal breath sounds.   Abdominal:      General: Abdomen is flat. Bowel sounds are normal.      Palpations: Abdomen is soft.   Musculoskeletal:         General: Swelling: left first and second  MCP joints.      Cervical back: Normal range of motion.   Skin:     General: Skin is warm and dry.   Neurological:      General: No focal deficit present.      Mental Status: She is alert and oriented to person, place, and time.      Sensory: No sensory deficit.   Psychiatric:         Mood and Affect: Mood normal.         Behavior: Behavior normal.          Laboratory:  CBC:  Recent Labs   Lab Result Units 07/12/23  1402   WBC K/uL 8.22   RBC M/uL 4.59   Hemoglobin g/dL 14.2   Hematocrit % 40.7   Platelets K/uL 355   MCV fL 89   MCH pg 30.9   MCHC g/dL 34.9       CMP:  Recent Labs   Lab Result Units 07/12/23  1402   Glucose mg/dL 89   Calcium mg/dL 9.1   Albumin g/dL 4.3   Total Protein g/dL 7.6   Sodium mmol/L 138   Potassium mmol/L 4.3   CO2 mmol/L 25   Chloride mmol/L 103   BUN mg/dL 12   Alkaline Phosphatase U/L 70   ALT U/L 18   AST U/L 26   Total Bilirubin mg/dL 0.7       URINALYSIS:  No results for input(s): "COLORU", "CLARITYU", "SPECGRAV", "PHUR", "PROTEINUA", "GLUCOSEU", "BILIRUBINCON", "BLOODU", "WBCU", "RBCU", "BACTERIA", "MUCUS", "NITRITE", "LEUKOCYTESUR", "UROBILINOGEN", "HYALINECASTS" in the last 2160 " "hours.   LIPIDS:  No results for input(s): "TSH", "HDL", "CHOL", "TRIG", "LDLCALC", "CHOLHDL", "NONHDLCHOL", "TOTALCHOLEST" in the last 2160 hours.  TSH:  No results for input(s): "TSH" in the last 2160 hours.  A1C:  No results for input(s): "HGBA1C" in the last 2160 hours.    Radiology:      Assessment/Plan     Carly Carmen is a 57 y.o.female with:    1. Annual physical  -preventive counseling provided  -due for shingles , flu and COVID , she declined for now  -UTD on pap smear, mammogram and colonoscopy    2. Seronegative RA  -s/p rheum eval  -now on humira [id number]  -NSAIDS PRN  -symptoms improving  -c/w current regimen    3. PMS  -well controlled on current regimen    4. AGUSTO  -well controlled on current regimen    5. Centrilobular emphysema  -s/p Pulm eval  -quit smoking 4 years ago but still vapes  -continued vaping cessation advised  -asympomatic for now  -will monitor    6. Chronic insomnia  -patient advised to stop tizanidine due to side effect profile  -sleep hygiene counseling   -trial of trazodone nightly      Vaccines recommended :COVID, Flu and shingles, declined     Continue current medications and maintain follow up with specialists.        Dr Tessy Dewey MD  Internal Medicine  [facility]"

## 2023-09-19 DIAGNOSIS — F51.04 CHRONIC INSOMNIA: ICD-10-CM

## 2023-09-20 RX ORDER — TRAZODONE HYDROCHLORIDE 50 MG/1
50 TABLET ORAL NIGHTLY PRN
Qty: 90 TABLET | Refills: 0 | Status: SHIPPED | OUTPATIENT
Start: 2023-09-20 | End: 2024-01-09

## 2023-10-16 ENCOUNTER — OFFICE VISIT (OUTPATIENT)
Dept: RHEUMATOLOGY | Facility: CLINIC | Age: 58
End: 2023-10-16
Payer: COMMERCIAL

## 2023-10-16 VITALS
DIASTOLIC BLOOD PRESSURE: 90 MMHG | SYSTOLIC BLOOD PRESSURE: 150 MMHG | HEART RATE: 87 BPM | WEIGHT: 181 LBS | HEIGHT: 68 IN | BODY MASS INDEX: 27.43 KG/M2

## 2023-10-16 DIAGNOSIS — M06.00 SERONEGATIVE RHEUMATOID ARTHRITIS: ICD-10-CM

## 2023-10-16 DIAGNOSIS — D84.821 IMMUNODEFICIENCY DUE TO LONG TERM DRUG THERAPY: Primary | ICD-10-CM

## 2023-10-16 DIAGNOSIS — Z79.899 IMMUNODEFICIENCY DUE TO LONG TERM DRUG THERAPY: Primary | ICD-10-CM

## 2023-10-16 DIAGNOSIS — R93.89 ABNORMAL CT OF THE CHEST: ICD-10-CM

## 2023-10-16 PROCEDURE — 3080F PR MOST RECENT DIASTOLIC BLOOD PRESSURE >= 90 MM HG: ICD-10-PCS | Mod: CPTII,S$GLB,, | Performed by: STUDENT IN AN ORGANIZED HEALTH CARE EDUCATION/TRAINING PROGRAM

## 2023-10-16 PROCEDURE — 99999 PR PBB SHADOW E&M-EST. PATIENT-LVL III: ICD-10-PCS | Mod: PBBFAC,,, | Performed by: STUDENT IN AN ORGANIZED HEALTH CARE EDUCATION/TRAINING PROGRAM

## 2023-10-16 PROCEDURE — 3008F BODY MASS INDEX DOCD: CPT | Mod: CPTII,S$GLB,, | Performed by: STUDENT IN AN ORGANIZED HEALTH CARE EDUCATION/TRAINING PROGRAM

## 2023-10-16 PROCEDURE — 99215 OFFICE O/P EST HI 40 MIN: CPT | Mod: S$GLB,,, | Performed by: STUDENT IN AN ORGANIZED HEALTH CARE EDUCATION/TRAINING PROGRAM

## 2023-10-16 PROCEDURE — 3044F HG A1C LEVEL LT 7.0%: CPT | Mod: CPTII,S$GLB,, | Performed by: STUDENT IN AN ORGANIZED HEALTH CARE EDUCATION/TRAINING PROGRAM

## 2023-10-16 PROCEDURE — 3044F PR MOST RECENT HEMOGLOBIN A1C LEVEL <7.0%: ICD-10-PCS | Mod: CPTII,S$GLB,, | Performed by: STUDENT IN AN ORGANIZED HEALTH CARE EDUCATION/TRAINING PROGRAM

## 2023-10-16 PROCEDURE — 3077F PR MOST RECENT SYSTOLIC BLOOD PRESSURE >= 140 MM HG: ICD-10-PCS | Mod: CPTII,S$GLB,, | Performed by: STUDENT IN AN ORGANIZED HEALTH CARE EDUCATION/TRAINING PROGRAM

## 2023-10-16 PROCEDURE — 1159F PR MEDICATION LIST DOCUMENTED IN MEDICAL RECORD: ICD-10-PCS | Mod: CPTII,S$GLB,, | Performed by: STUDENT IN AN ORGANIZED HEALTH CARE EDUCATION/TRAINING PROGRAM

## 2023-10-16 PROCEDURE — 3008F PR BODY MASS INDEX (BMI) DOCUMENTED: ICD-10-PCS | Mod: CPTII,S$GLB,, | Performed by: STUDENT IN AN ORGANIZED HEALTH CARE EDUCATION/TRAINING PROGRAM

## 2023-10-16 PROCEDURE — 1159F MED LIST DOCD IN RCRD: CPT | Mod: CPTII,S$GLB,, | Performed by: STUDENT IN AN ORGANIZED HEALTH CARE EDUCATION/TRAINING PROGRAM

## 2023-10-16 PROCEDURE — 3080F DIAST BP >= 90 MM HG: CPT | Mod: CPTII,S$GLB,, | Performed by: STUDENT IN AN ORGANIZED HEALTH CARE EDUCATION/TRAINING PROGRAM

## 2023-10-16 PROCEDURE — 99215 PR OFFICE/OUTPT VISIT, EST, LEVL V, 40-54 MIN: ICD-10-PCS | Mod: S$GLB,,, | Performed by: STUDENT IN AN ORGANIZED HEALTH CARE EDUCATION/TRAINING PROGRAM

## 2023-10-16 PROCEDURE — 3077F SYST BP >= 140 MM HG: CPT | Mod: CPTII,S$GLB,, | Performed by: STUDENT IN AN ORGANIZED HEALTH CARE EDUCATION/TRAINING PROGRAM

## 2023-10-16 PROCEDURE — 99999 PR PBB SHADOW E&M-EST. PATIENT-LVL III: CPT | Mod: PBBFAC,,, | Performed by: STUDENT IN AN ORGANIZED HEALTH CARE EDUCATION/TRAINING PROGRAM

## 2023-10-16 RX ORDER — TIZANIDINE 2 MG/1
2 TABLET ORAL 2 TIMES DAILY
COMMUNITY
Start: 2023-10-07

## 2023-10-16 RX ORDER — ADALIMUMAB 40MG/0.4ML
40 KIT SUBCUTANEOUS
Qty: 3 PEN | Refills: 6 | Status: CANCELLED | OUTPATIENT
Start: 2023-10-16 | End: 2024-05-13

## 2023-10-16 RX ORDER — ADALIMUMAB 40MG/0.4ML
40 KIT SUBCUTANEOUS
Qty: 3 PEN | Refills: 3 | Status: SHIPPED | OUTPATIENT
Start: 2023-10-16 | End: 2023-10-20 | Stop reason: SDUPTHER

## 2023-10-16 RX ORDER — HYDROXYCHLOROQUINE SULFATE 200 MG/1
200 TABLET, FILM COATED ORAL 2 TIMES DAILY
Qty: 180 TABLET | Refills: 2 | Status: SHIPPED | OUTPATIENT
Start: 2023-10-16

## 2023-10-16 RX ORDER — HYDROXYCHLOROQUINE SULFATE 200 MG/1
200 TABLET, FILM COATED ORAL 2 TIMES DAILY
COMMUNITY
End: 2023-10-16 | Stop reason: SDUPTHER

## 2023-10-16 NOTE — PROGRESS NOTES
Subjective:      Patient ID: Carly Carmen is a 57 y.o. female.    Chief Complaint: Disease Management and Establish Care.     HPI     58 year old F with no significant PMH here for follow up in RA    She is new to me. Used to follow with Dr. Velarde at [facility]. Last visit in 5/2023.     She started to have joint pain beginning of September 2022. She has pain in shoulders, wrists, and hands.  She gets swelling in hands and wrists for the last several months. She is stiff in hands  and wrists all day.  She takes indomethacin 50 mg every 6 months.She is taking plaquenil 200mg po BID for last month.  Pain level is 8/10. She has trouble getting dressed, opening door knob, and driving.  Denies any rashes, oral ulcers, or alopecia.  She smoked 1 ppd for about 30 years. She quit smoking about 4 years ago and now is vaping.    MRI bilateral hands 2/2023:  1. Constellation of findings most suggestive of sequela of rheumatoid arthritis with moderate to severe disease activity and high risk of future aggressiveness.    She has hx of one episode of hydradenitis suppurative in groin that required excision in 2016.     Family hx: negative for RA or SLE     Interval history:   -Last visit with Dr. Velarde in 5/2023.   -Humira started in May 2023. Working great for her joint pain and swelling but since August she had been noticing that it was not lasting 14 days but more 11 days or so. When effect wears off she reports pain and swelling in ankles, feet, shoulders, R 2nd MCP and left thumb. MS 1 hour. Had been taking ibuprofen sparingly to help with pain.   -She is taking HCQ but only 200 mg daily.   -denies side effects or injection reaction from Humira.   -Labs in 7/2023 were normal  -repeat CT chest showed stable findings that are likely related to prior hx of smoking. No further workup needed except follow up CT in 6 months.     Answers submitted by the patient for this visit:  Rheumatology Questionnaire (Submitted on  10/16/2023)  fever: No  eye redness: No  mouth sores: No  headaches: No  shortness of breath: No  chest pain: No  trouble swallowing: No  diarrhea: No  constipation: No  unexpected weight change: No  genital sore: No  dysuria: No  During the last 3 days, have you had a skin rash?: No  Bruises or bleeds easily: No  cough: No        Past Medical History:   Diagnosis Date    Abnormal Pap smear of cervix years ago    colpo done    Allergy     Chronic hip pain     Hidradenitis suppurativa 4/8/2016       Review of Systems   Constitutional:  Negative for activity change, appetite change, chills, diaphoresis, fatigue and fever.   HENT:  Negative for congestion, ear discharge, ear pain, facial swelling, mouth sores, sinus pressure, sneezing, sore throat, tinnitus and trouble swallowing.    Eyes:  Negative for photophobia, pain, discharge, redness, itching and visual disturbance.   Respiratory:  Negative for apnea, cough, chest tightness, shortness of breath, wheezing and stridor.    Cardiovascular:  Negative for chest pain and leg swelling.   Gastrointestinal:  Negative for abdominal distention, abdominal pain, anal bleeding, blood in stool, constipation, diarrhea and nausea.   Endocrine: Negative for cold intolerance and heat intolerance.   Genitourinary:  Negative for difficulty urinating, dysuria and genital sores.   Musculoskeletal:  Positive for arthralgias, back pain, gait problem and joint swelling. Negative for myalgias, neck pain and neck stiffness.   Skin:  Negative for color change, pallor, rash and wound.   Neurological:  Negative for dizziness, seizures, light-headedness, numbness and headaches.   Hematological:  Negative for adenopathy. Does not bruise/bleed easily.   Psychiatric/Behavioral:  Negative for sleep disturbance. The patient is not nervous/anxious.          Objective:     Vitals:    10/16/23 1010   BP: (!) 150/90   Pulse: 87         Physical Exam   Constitutional: She is oriented to person, place,  and time.   HENT:   Head: Normocephalic and atraumatic.   Nose: Nose normal.   Mouth/Throat: Oropharynx is clear and moist.  Neck: Supple  Pulmonary/Chest: Effort normal   Musculoskeletal:         General: No tenderness.      Cervical back: Neck supple.      Tenderness to palpation in R 2nd MCP, no synovitis.   Lymphadenopathy:     She has no cervical adenopathy.   Neurological: She is alert and oriented to person, place, and time. No cranial nerve deficit.   Skin: Skin is dry. No bruising and no rash noted. No erythema.   Psychiatric: Affect and judgment normal.           Assessment:   58 year old F with no significant PMH here for follow up of seronegative RA.  Clinical exam consistent with RA. Humira started on 5/2023 due to high disease activity which has helped significantly but effect wears off at 10 days. She is also taking  mg QD which is under the appropiate dose for her weight.     She is to follow up with pulmonary for abnormal CT chest.     DEXA scan 12/2021 normal    Plan:      Increase Humira 40 mg SC to every 10 days. New rx sent to pharmacy  Patient aware of risks of TNF inhibitor discussed with patient and not limited to cell count abnormalities, malignancy, allergic  reaction to medication, and increased risk of infection. Patient agrees with continuing medication   Increase  mg to twice daily. She voiced understanding and agrees   Labs now and in 3 months   RTC in 3 months.   Avoid steroids due to weight gain  In the future can consider other TNFs, IL-6 or Antonio  Will refer to ophthalmology during next visit.   Will address bone health during next visit. She will need repeat DEXA scan after 12/2023       60 minutes of total time spent on the encounter, which includes face to face time and non-face to face time preparing to see the patient (eg, review of tests), Obtaining and/or reviewing separately obtained history, Documenting clinical information in the electronic or other health  record, Independently interpreting results (not separately reported) and communicating results to the patient/family/caregiver, or Care coordination (not separately reported).

## 2023-10-16 NOTE — PATIENT INSTRUCTIONS
Take hydroxychloroquine 200 mg twice daily  Humira injection every 10 days.   Labs now and then in 3 months  Follow up in 3 months

## 2023-10-17 ENCOUNTER — PATIENT MESSAGE (OUTPATIENT)
Dept: RHEUMATOLOGY | Facility: CLINIC | Age: 58
End: 2023-10-17
Payer: COMMERCIAL

## 2023-10-17 ENCOUNTER — TELEPHONE (OUTPATIENT)
Dept: RHEUMATOLOGY | Facility: CLINIC | Age: 58
End: 2023-10-17
Payer: COMMERCIAL

## 2023-10-17 NOTE — TELEPHONE ENCOUNTER
Prior authorization approved Case ID: [id number]      Payer:  [employer] Patient's Payer    [phone]    An active PA is already on file with expiration date of 02/15/2024. Please wait to resubmit request within 60 days of that expiration date to obtain a PA renewal.   Electronic appeal:  Not supported   View History

## 2023-10-19 RX ORDER — ADALIMUMAB 40MG/0.4ML
40 KIT SUBCUTANEOUS
Qty: 2 PEN | Refills: 11 | Status: CANCELLED | OUTPATIENT
Start: 2023-10-19 | End: 2024-01-11

## 2023-10-20 DIAGNOSIS — M06.00 SERONEGATIVE RHEUMATOID ARTHRITIS: Primary | ICD-10-CM

## 2023-10-20 RX ORDER — ADALIMUMAB 40MG/0.4ML
40 KIT SUBCUTANEOUS
Qty: 3 PEN | Refills: 3 | OUTPATIENT
Start: 2023-10-20 | End: 2024-02-17

## 2023-10-23 RX ORDER — ADALIMUMAB 40MG/0.4ML
40 KIT SUBCUTANEOUS
Qty: 2 PEN | Refills: 11 | Status: CANCELLED | OUTPATIENT
Start: 2023-10-23 | End: 2024-01-15

## 2023-11-01 ENCOUNTER — PATIENT MESSAGE (OUTPATIENT)
Dept: FAMILY MEDICINE | Facility: CLINIC | Age: 58
End: 2023-11-01
Payer: COMMERCIAL

## 2023-11-14 ENCOUNTER — PATIENT MESSAGE (OUTPATIENT)
Dept: FAMILY MEDICINE | Facility: CLINIC | Age: 58
End: 2023-11-14
Payer: COMMERCIAL

## 2023-11-15 NOTE — TELEPHONE ENCOUNTER
Pt was called and sent a detail portal message.   pt with allergy to Azactam in ER, had episode of hives/rash   meropenem, zosyn and cefepime all concern for cross sensitivity reaction  pt also with ESRD with a GFR 7 and already received dose of azactam and vanco this evening   awaiting further recommendations from ID  d/w pharmacy, dr clements and dr spaulding (ID) via teams

## 2023-12-23 DIAGNOSIS — Z79.890 HORMONE REPLACEMENT THERAPY (HRT): ICD-10-CM

## 2023-12-26 RX ORDER — ESTERIFIED ESTROGEN AND METHYLTESTOSTERONE .625; 1.25 MG/1; MG/1
1 TABLET ORAL
Qty: 90 TABLET | Refills: 3 | Status: SHIPPED | OUTPATIENT
Start: 2023-12-26

## 2024-01-08 DIAGNOSIS — F51.04 CHRONIC INSOMNIA: ICD-10-CM

## 2024-01-09 RX ORDER — TRAZODONE HYDROCHLORIDE 50 MG/1
50 TABLET ORAL NIGHTLY
Qty: 90 TABLET | Refills: 3 | Status: SHIPPED | OUTPATIENT
Start: 2024-01-09

## 2024-01-18 ENCOUNTER — PATIENT MESSAGE (OUTPATIENT)
Dept: FAMILY MEDICINE | Facility: CLINIC | Age: 59
End: 2024-01-18
Payer: COMMERCIAL

## 2024-01-19 ENCOUNTER — OFFICE VISIT (OUTPATIENT)
Dept: FAMILY MEDICINE | Facility: CLINIC | Age: 59
End: 2024-01-19
Payer: COMMERCIAL

## 2024-01-19 DIAGNOSIS — K59.09 CHRONIC CONSTIPATION: Primary | ICD-10-CM

## 2024-01-19 DIAGNOSIS — F51.04 CHRONIC INSOMNIA: ICD-10-CM

## 2024-01-19 DIAGNOSIS — I70.0 AORTIC ATHEROSCLEROSIS: ICD-10-CM

## 2024-01-19 DIAGNOSIS — Z79.899 IMMUNODEFICIENCY DUE TO LONG TERM DRUG THERAPY: ICD-10-CM

## 2024-01-19 DIAGNOSIS — J43.2 CENTRILOBULAR EMPHYSEMA: ICD-10-CM

## 2024-01-19 DIAGNOSIS — M06.00 SERONEGATIVE RHEUMATOID ARTHRITIS: ICD-10-CM

## 2024-01-19 DIAGNOSIS — D84.821 IMMUNODEFICIENCY DUE TO LONG TERM DRUG THERAPY: ICD-10-CM

## 2024-01-19 DIAGNOSIS — N95.1 POST MENOPAUSAL SYNDROME: ICD-10-CM

## 2024-01-19 PROCEDURE — 99214 OFFICE O/P EST MOD 30 MIN: CPT | Mod: 95,,, | Performed by: STUDENT IN AN ORGANIZED HEALTH CARE EDUCATION/TRAINING PROGRAM

## 2024-01-19 NOTE — PROGRESS NOTES
[facility] Note    Chief Complaint      Chief Complaint   Patient presents with    Worsening Constipation  Follow-up on chronic conditions       The patient location is: Home  The chief complaint leading to consultation is: Worsening constipation , f/u on chronic conditions    Visit type: audiovisual    Face to Face time with patient: 20 minutes  30  minutes of total time spent on the encounter, which includes face to face time and non-face to face time preparing to see the patient (eg, review of tests), Obtaining and/or reviewing separately obtained history, Documenting clinical information in the electronic or other health record, Independently interpreting results (not separately reported) and communicating results to the patient/family/caregiver, or Care coordination (not separately reported).         Each patient to whom he or she provides medical services by telemedicine is:  (1) informed of the relationship between the physician and patient and the respective role of any other health care provider with respect to management of the patient; and (2) notified that he or she may decline to receive medical services by telemedicine and may withdraw from such care at any time.    Notes:    History of Present Illness      Carly Carmen is a 58 y.o. female with HLD, PMS on HRT, anxiety and chronic right hip/leg pain with sciatica 2/2 right lumbar disc radiculitis, centrilobular emphysema and recurrent joints pain recently diagnosed with seronegative rheumatoid arthritis now on humira qweekly for follow up on chronic conditions in addition c/o worsening constipations in the past few months despite increasing her fiber intake, water intake , fiber supplement, miralalx and docusate OTC, she feels bloated but pass flatus, no abdominal swelling.    Rheum : Dr Jake MD  Ortho : Dr Mirza Singletary  Gyn: Dr Ofelia Everett  Pulm: Dr Joana Zavala MD    Problem List Addressed This  Visit:    1. Chronic constipation  -     linaCLOtide (LINZESS) 145 mcg Cap capsule; Take 1 capsule (145 mcg total) by mouth before breakfast.  Dispense: 30 capsule; Refill: 2    2. Chronic insomnia    3. Centrilobular emphysema  Overview:  CT chest with upper lung zone predominant centrilobular emphysema and biapical scarring.  30 pack year history.  Quit 4 years ago.  Now vaping.  PFTs ordered.      4. Seronegative rheumatoid arthritis    5. 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  JUSTINO, RF, CCP negative but with polyarthralgias and reportedly clinically consistent with RA.  On prednisone taper currently and humira. Patient will be at greater risk for opportunistic infections      6. Aortic atherosclerosis  Overview:  Seen on 03/13/2023 CT chest.  Known hyperlipidemia.  On atorvastatin.      7. Post menopausal syndrome        Health Maintenance   Topic Date Due    Shingles Vaccine (1 of 2) Never done    Mammogram  07/31/2024    High Dose Statin  10/16/2024    Colorectal Cancer Screening  01/08/2026    TETANUS VACCINE  12/01/2026    Lipid Panel  08/23/2028    Hepatitis C Screening  Completed       Past Medical History:   Diagnosis Date    Abnormal Pap smear of cervix years ago    colpo done    Allergy     Chronic hip pain     Hidradenitis suppurativa 4/8/2016       Past Surgical History:   Procedure Laterality Date    BREAST BIOPSY Left 2015    BENIGN    COLONOSCOPY N/A 1/8/2016    Procedure: COLONOSCOPY;  Surgeon: Luis Santana Jr., MD;  Location: [facility];  Service: Endoscopy;  Laterality: N/A;    CYST REMOVAL      subcutaneous cyst to right chest wall    DILATION AND CURETTAGE OF UTERUS         family history includes Cancer (age of onset: 69) in her mother; Heart disease in her father; No Known Problems in her brother and brother.    Social History     Tobacco Use    Smoking status: Former     Current packs/day: 0.50     Average packs/day: 0.5 packs/day for  30.0 years (15.0 ttl pk-yrs)     Types: Cigarettes, Vaping with nicotine     Passive exposure: Current    Smokeless tobacco: Never   Substance Use Topics    Alcohol use: Yes     Alcohol/week: 8.0 standard drinks of alcohol     Types: 8 Glasses of wine per week     Comment: weekends    Drug use: Yes     Types: Amphetamines       Review of Systems   Constitutional:  Negative for activity change, fatigue, fever and unexpected weight change.   HENT:  Negative for hearing loss, rhinorrhea and trouble swallowing.    Eyes:  Negative for discharge and visual disturbance.   Respiratory:  Negative for cough, chest tightness and wheezing.    Cardiovascular:  Negative for chest pain and palpitations.   Gastrointestinal:  Positive for constipation. Negative for abdominal distention, abdominal pain, blood in stool, diarrhea and vomiting.   Endocrine: Negative for polydipsia, polyphagia and polyuria.   Genitourinary:  Negative for difficulty urinating, dysuria, frequency, hematuria and menstrual problem.   Musculoskeletal:  Negative for arthralgias, back pain, gait problem, joint swelling and neck pain.   Skin:  Negative for rash.   Neurological:  Negative for seizures, weakness, numbness and headaches.   Psychiatric/Behavioral:  Negative for confusion, dysphoric mood and sleep disturbance.        Outpatient Encounter Medications as of 1/19/2024   Medication Sig Dispense Refill    adalimumab (HUMIRA,CF, PEN) 40 mg/0.4 mL PnKt Inject 0.4 mLs (40 mg total) into the skin every 10 days. 3 pen 3    adalimumab 40 mg/0.4 mL PnKt Inject 0.4 mLs (40 mg total) into the skin every 7 days. 4 Pen 3    atorvastatin (LIPITOR) 20 MG tablet TAKE 1 TABLET DAILY 90 tablet 3    busPIRone (BUSPAR) 5 MG Tab Take 1 tablet (5 mg total) by mouth 3 (three) times daily as needed (anxiety). 90 tablet 0    DULoxetine (CYMBALTA) 30 MG capsule       estrogens,esterified,-methyltestosterone 0.625-1.25mg (ESTRATEST HS) per tablet TAKE 1 TABLET DAILY 90 tablet 3  "   hydroxychloroquine (PLAQUENIL) 200 mg tablet Take 1 tablet (200 mg total) by mouth 2 (two) times a day. 180 tablet 2    indomethacin (INDOCIN) 50 MG capsule Take 1 capsule (50 mg total) by mouth 2 (two) times daily as needed. 60 capsule 3    linaCLOtide (LINZESS) 145 mcg Cap capsule Take 1 capsule (145 mcg total) by mouth before breakfast. 30 capsule 2    medroxyPROGESTERone (PROVERA) 2.5 MG tablet Take 2.5 mg by mouth once daily.      omega-3 fatty acids/fish oil (FISH OIL-OMEGA-3 FATTY ACIDS) 300-1,000 mg capsule Take by mouth once daily.      tiZANidine (ZANAFLEX) 2 MG tablet Take 2 mg by mouth 2 (two) times daily.      traZODone (DESYREL) 50 MG tablet TAKE 1 TABLET NIGHTLY AS NEEDED FOR INSOMNIA 90 tablet 3     No facility-administered encounter medications on file as of 1/19/2024.        Review of patient's allergies indicates:  No Known Allergies    Physical Exam       ]       NA    Laboratory:  CBC:  Recent Labs   Lab Result Units 01/13/24  1016   WBC K/uL 4.60   RBC M/uL 4.96   Hemoglobin g/dL 15.3   Hematocrit % 43.7   Platelets K/uL 308   MCV fL 88   MCH pg 30.8   MCHC g/dL 35.0       CMP:  Recent Labs   Lab Result Units 01/13/24  1016   Glucose mg/dL 111*   Calcium mg/dL 9.4   Albumin g/dL 4.4   Total Protein g/dL 7.6   Sodium mmol/L 143   Potassium mmol/L 3.9   CO2 mmol/L 26   Chloride mmol/L 108   BUN mg/dL 11   Alkaline Phosphatase U/L 76   ALT U/L 27   AST U/L 31   Total Bilirubin mg/dL 0.8       URINALYSIS:  No results for input(s): "COLORU", "CLARITYU", "SPECGRAV", "PHUR", "PROTEINUA", "GLUCOSEU", "BILIRUBINCON", "BLOODU", "WBCU", "RBCU", "BACTERIA", "MUCUS", "NITRITE", "LEUKOCYTESUR", "UROBILINOGEN", "HYALINECASTS" in the last 2160 hours.   LIPIDS:  No results for input(s): "TSH", "HDL", "CHOL", "TRIG", "LDLCALC", "CHOLHDL", "NONHDLCHOL", "TOTALCHOLEST" in the last 2160 hours.  TSH:  No results for input(s): "TSH" in the last 2160 hours.  A1C:  No results for input(s): "HGBA1C" in the last 2160 " hours.    Radiology:      Assessment/Plan     Carly Carmen is a 58 y.o.female with:    1. Chronic constipation  -worsening, likely iatrogenic(tizanidine/trazodone) and PMS  -OTC docusate, psyllium, miralax not helpful  -can not d/c above medications due to benefits outweigh risks  -start on linzess    2. Seronegative RA  -s/p rheum eval  -now on humira qweekly  -NSAIDS PRN  -symptoms improving  -c/w current regimen    3. PMS  -well controlled on current regimen    4. AGUSTO  -well controlled on current regimen    5. Centrilobular emphysema  -s/p Pulm eval  -quit smoking 4 years ago but still vapes  -continued vaping cessation advised  -asympomatic for now  -will monitor    6. Chronic insomnia  -patient advised to stop tizanidine due to side effect profile  -sleep hygiene counseling   -trial of trazodone nightly    7. HLD/aortic atherosclerosis  -c/w lipitor  -c/w life style modifications      Vaccines recommended :COVID, Flu and shingles, declined     Continue current medications and maintain follow up with specialists.        Dr Tessy Dewey MD  Internal Medicine  [facility]

## 2024-02-15 ENCOUNTER — PATIENT MESSAGE (OUTPATIENT)
Dept: FAMILY MEDICINE | Facility: CLINIC | Age: 59
End: 2024-02-15
Payer: COMMERCIAL

## 2024-02-15 ENCOUNTER — TELEPHONE (OUTPATIENT)
Dept: FAMILY MEDICINE | Facility: CLINIC | Age: 59
End: 2024-02-15
Payer: COMMERCIAL

## 2024-02-15 VITALS — SYSTOLIC BLOOD PRESSURE: 128 MMHG | DIASTOLIC BLOOD PRESSURE: 76 MMHG

## 2024-02-15 DIAGNOSIS — K21.9 GASTROESOPHAGEAL REFLUX DISEASE WITHOUT ESOPHAGITIS: Primary | ICD-10-CM

## 2024-02-15 RX ORDER — PANTOPRAZOLE SODIUM 40 MG/1
40 TABLET, DELAYED RELEASE ORAL DAILY
Qty: 40 TABLET | Refills: 3 | Status: SHIPPED | OUTPATIENT
Start: 2024-02-15 | End: 2024-03-18 | Stop reason: SDUPTHER

## 2024-03-18 DIAGNOSIS — K21.9 GASTROESOPHAGEAL REFLUX DISEASE WITHOUT ESOPHAGITIS: ICD-10-CM

## 2024-03-18 RX ORDER — PANTOPRAZOLE SODIUM 40 MG/1
40 TABLET, DELAYED RELEASE ORAL DAILY
Qty: 40 TABLET | Refills: 3 | Status: SHIPPED | OUTPATIENT
Start: 2024-03-18 | End: 2024-04-16 | Stop reason: SDUPTHER

## 2024-03-26 RX ORDER — ADALIMUMAB 40MG/0.4ML
40 KIT SUBCUTANEOUS
Qty: 4 PEN | Refills: 3 | OUTPATIENT
Start: 2024-03-26

## 2024-03-27 ENCOUNTER — PATIENT MESSAGE (OUTPATIENT)
Dept: RHEUMATOLOGY | Facility: CLINIC | Age: 59
End: 2024-03-27
Payer: COMMERCIAL

## 2024-03-27 DIAGNOSIS — M06.00 SERONEGATIVE RHEUMATOID ARTHRITIS: Primary | ICD-10-CM

## 2024-04-09 ENCOUNTER — OFFICE VISIT (OUTPATIENT)
Dept: FAMILY MEDICINE | Facility: CLINIC | Age: 59
End: 2024-04-09
Payer: COMMERCIAL

## 2024-04-09 VITALS
WEIGHT: 185.44 LBS | HEIGHT: 68 IN | DIASTOLIC BLOOD PRESSURE: 78 MMHG | HEART RATE: 94 BPM | OXYGEN SATURATION: 98 % | BODY MASS INDEX: 28.1 KG/M2 | SYSTOLIC BLOOD PRESSURE: 130 MMHG

## 2024-04-09 DIAGNOSIS — Z00.00 ANNUAL PHYSICAL EXAM: Primary | ICD-10-CM

## 2024-04-09 DIAGNOSIS — M06.00 SERONEGATIVE RHEUMATOID ARTHRITIS: ICD-10-CM

## 2024-04-09 DIAGNOSIS — J43.2 CENTRILOBULAR EMPHYSEMA: Chronic | ICD-10-CM

## 2024-04-09 DIAGNOSIS — I70.0 AORTIC ATHEROSCLEROSIS: Chronic | ICD-10-CM

## 2024-04-09 DIAGNOSIS — E78.2 MIXED HYPERLIPIDEMIA: Chronic | ICD-10-CM

## 2024-04-09 PROBLEM — Z79.899 IMMUNODEFICIENCY DUE TO LONG TERM DRUG THERAPY: Chronic | Status: ACTIVE | Noted: 2023-04-28

## 2024-04-09 PROBLEM — D84.821 IMMUNODEFICIENCY DUE TO LONG TERM DRUG THERAPY: Chronic | Status: ACTIVE | Noted: 2023-04-28

## 2024-04-09 PROCEDURE — 3075F SYST BP GE 130 - 139MM HG: CPT | Mod: CPTII,S$GLB,, | Performed by: FAMILY MEDICINE

## 2024-04-09 PROCEDURE — 1159F MED LIST DOCD IN RCRD: CPT | Mod: CPTII,S$GLB,, | Performed by: FAMILY MEDICINE

## 2024-04-09 PROCEDURE — 99999 PR PBB SHADOW E&M-EST. PATIENT-LVL IV: CPT | Mod: PBBFAC,,, | Performed by: FAMILY MEDICINE

## 2024-04-09 PROCEDURE — 99396 PREV VISIT EST AGE 40-64: CPT | Mod: 25,S$GLB,, | Performed by: FAMILY MEDICINE

## 2024-04-09 PROCEDURE — 3078F DIAST BP <80 MM HG: CPT | Mod: CPTII,S$GLB,, | Performed by: FAMILY MEDICINE

## 2024-04-09 PROCEDURE — 3008F BODY MASS INDEX DOCD: CPT | Mod: CPTII,S$GLB,, | Performed by: FAMILY MEDICINE

## 2024-04-09 RX ORDER — TIRZEPATIDE 2.5 MG/.5ML
2.5 INJECTION, SOLUTION SUBCUTANEOUS
Qty: 4 PEN | Refills: 0 | Status: SHIPPED | OUTPATIENT
Start: 2024-04-09 | End: 2024-04-09

## 2024-04-09 RX ORDER — TIRZEPATIDE 2.5 MG/.5ML
2.5 INJECTION, SOLUTION SUBCUTANEOUS
Qty: 4 PEN | Refills: 0 | Status: SHIPPED | OUTPATIENT
Start: 2024-04-09 | End: 2024-05-09

## 2024-04-09 NOTE — PROGRESS NOTES
PATIENT VISIT FAMILY MEDICINE    CC:   Chief Complaint   Patient presents with    [facility]     6 mnth f/u       HPI: Carly Carmen  is a 58 y.o. female:    Patient is new to me.  Patient presents for annual    Patient doing well overall, taking medications as prescribed and with no acute concerns.         PMHX:   Past Medical History:   Diagnosis Date    Abnormal Pap smear of cervix years ago    colpo done    Allergy     Chronic hip pain     Hidradenitis suppurativa 2016       PSHX:   Past Surgical History:   Procedure Laterality Date    BREAST BIOPSY Left     BENIGN    COLONOSCOPY N/A 2016    Procedure: COLONOSCOPY;  Surgeon: Luis Santana Jr., MD;  Location: [facility];  Service: Endoscopy;  Laterality: N/A;    CYST REMOVAL      subcutaneous cyst to right chest wall    DILATION AND CURETTAGE OF UTERUS         FAMHX:   Family History   Problem Relation Age of Onset    Cancer Mother 69        Lung Cancer with Brain Mets -  72    Heart disease Father         defibrillator    No Known Problems Brother     No Known Problems Brother     Breast cancer Neg Hx     Colon cancer Neg Hx     Ovarian cancer Neg Hx        SOCHX:   Social History     Socioeconomic History    Marital status:     Number of children: 1   Tobacco Use    Smoking status: Former     Current packs/day: 0.50     Average packs/day: 0.5 packs/day for 30.0 years (15.0 ttl pk-yrs)     Types: Cigarettes, Vaping with nicotine     Passive exposure: Current    Smokeless tobacco: Never   Substance and Sexual Activity    Alcohol use: Yes     Alcohol/week: 8.0 standard drinks of alcohol     Types: 8 Glasses of wine per week     Comment: weekends    Drug use: Yes     Types: Amphetamines    Sexual activity: Yes     Partners: Male     Birth control/protection: Post-menopausal     Comment:    Social History Narrative    She is from [place].  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   She is twice .  Her first   in .  She remarried in .  She has a 25 year-old daughter from her first marriage, who is getting  in 2014.  She lives in [place] with her  and her 18-year-old step son.         Social Determinants of Health     Financial Resource Strain: Low Risk  (2024)    Overall Financial Resource Strain (CARDIA)     Difficulty of Paying Living Expenses: Not hard at all   Food Insecurity: No Food Insecurity (2024)    Hunger Vital Sign     Worried About Running Out of Food in the Last Year: Never true     Ran Out of Food in the Last Year: Never true   Transportation Needs: No Transportation Needs (2024)    PRAPARE - Transportation     Lack of Transportation (Medical): No     Lack of Transportation (Non-Medical): No   Physical Activity: Insufficiently Active (2024)    Exercise Vital Sign     Days of Exercise per Week: 1 day     Minutes of Exercise per Session: 20 min   Stress: No Stress Concern Present (2024)    [place] [employer] - Occupational Stress Questionnaire     Feeling of Stress : Not at all   Social Connections: Unknown (2024)    Social Connection and Isolation Panel [NHANES]     Frequency of Communication with Friends and Family: More than three times a week     Frequency of Social Gatherings with Friends and Family: More than three times a week     Active Member of Clubs or Organizations: No     Attends Club or Organization Meetings: Never     Marital Status:    Housing Stability: Low Risk  (2024)    Housing Stability Vital Sign     Unable to Pay for Housing in the Last Year: No     Number of Places Lived in the Last Year: 1     Unstable Housing in the Last Year: No       ALLERGIES: Review of patient's allergies indicates:  No Known Allergies    MEDS:   Current Outpatient Medications:     busPIRone (BUSPAR) 5 MG Tab, Take 1 tablet (5 mg total) by mouth 3 (three) times  "daily as needed (anxiety)., Disp: 90 tablet, Rfl: 0    DULoxetine (CYMBALTA) 30 MG capsule, , Disp: , Rfl:     estrogens,esterified,-methyltestosterone 0.625-1.25mg (ESTRATEST HS) per tablet, TAKE 1 TABLET DAILY, Disp: 90 tablet, Rfl: 3    hydroxychloroquine (PLAQUENIL) 200 mg tablet, Take 1 tablet (200 mg total) by mouth 2 (two) times a day., Disp: 180 tablet, Rfl: 2    indomethacin (INDOCIN) 50 MG capsule, Take 1 capsule (50 mg total) by mouth 2 (two) times daily as needed., Disp: 60 capsule, Rfl: 3    medroxyPROGESTERone (PROVERA) 2.5 MG tablet, Take 2.5 mg by mouth once daily., Disp: , Rfl:     pantoprazole (PROTONIX) 40 MG tablet, Take 1 tablet (40 mg total) by mouth once daily., Disp: 40 tablet, Rfl: 3    tiZANidine (ZANAFLEX) 2 MG tablet, Take 2 mg by mouth 2 (two) times daily., Disp: , Rfl:     traZODone (DESYREL) 50 MG tablet, TAKE 1 TABLET NIGHTLY AS NEEDED FOR INSOMNIA, Disp: 90 tablet, Rfl: 3    adalimumab 40 mg/0.4 mL PnKt, Inject 0.4 mLs (40 mg total) into the skin every 7 days., Disp: 4 Pen, Rfl: 6    atorvastatin (LIPITOR) 20 MG tablet, TAKE 1 TABLET DAILY, Disp: 90 tablet, Rfl: 3    tirzepatide, weight loss, (ZEPBOUND) 2.5 mg/0.5 mL PnIj, Inject 2.5 mg into the skin every 7 days., Disp: 4 Pen, Rfl: 0    OBJECTIVE:   Vitals:    04/09/24 1018   BP: 130/78   BP Location: Left arm   Patient Position: Sitting   BP Method: Medium (Manual)   Pulse: 94   SpO2: 98%   Weight: 84.1 kg (185 lb 6.5 oz)   Height: 5' 8" (1.727 m)     Body mass index is 28.19 kg/m².      Physical Exam  Vitals and nursing note reviewed.   Constitutional:       Appearance: Normal appearance.   HENT:      Head: Normocephalic.   Eyes:      General:         Right eye: No discharge.         Left eye: No discharge.      Extraocular Movements: Extraocular movements intact.   Cardiovascular:      Rate and Rhythm: Normal rate and regular rhythm.      Heart sounds: Normal heart sounds.   Pulmonary:      Effort: Pulmonary effort is normal.     "  Breath sounds: Normal breath sounds.   Abdominal:      General: Bowel sounds are normal. There is no distension.      Palpations: Abdomen is soft. There is no mass.      Tenderness: There is no abdominal tenderness. There is no guarding or rebound.      Hernia: No hernia is present.   Skin:     Comments: No obvious rash on exposed skin   Neurological:      Mental Status: She is alert.   Psychiatric:         Behavior: Behavior normal.           LABS:   A1C:  Recent Labs   Lab 05/19/23  1650   Hemoglobin A1C 5.6     CBC:  Recent Labs   Lab 01/13/24  1016   WBC 4.60   RBC 4.96   Hemoglobin 15.3   Hematocrit 43.7   Platelets 308   MCV 88   MCH 30.8   MCHC 35.0     CMP:  Recent Labs   Lab 01/13/24  1016   Glucose 111 H   Calcium 9.4   Albumin 4.4   Total Protein 7.6   Sodium 143   Potassium 3.9   CO2 26   Chloride 108   BUN 11   Creatinine 0.70   Alkaline Phosphatase 76   ALT 27   AST 31   Total Bilirubin 0.8     LIPIDS:  Recent Labs   Lab 08/23/23  1035   TSH 0.622   HDL 40   Cholesterol 127   Triglycerides 85   LDL Cholesterol 70.0   HDL/Cholesterol Ratio 31.5   Non-HDL Cholesterol 87   Total Cholesterol/HDL Ratio 3.2     TSH:  Recent Labs   Lab 08/23/23  1035   TSH 0.622         ASSESSMENT & PLAN:    Problem List Items Addressed This Visit          Pulmonary    Centrilobular emphysema (Chronic)    Overview     CT chest with upper lung zone predominant centrilobular emphysema and biapical scarring.  30 pack year history.  Quit 4 years ago. Followed by Pulm            Cardiac/Vascular    Aortic atherosclerosis (Chronic)    Overview     Seen on 03/13/2023 CT chest.  Known hyperlipidemia.  On atorvastatin.         Mixed hyperlipidemia (Chronic)    Overview     - well controlled  - continue current management plan   - patient encouraged to notify me with any changes            Immunology/Multi System    Seronegative rheumatoid arthritis (Chronic)    Overview     Stable. Continue current regimen. Followed by Rheumatology             Endocrine    BMI 28.0-28.9,adult Body mass index is 28.19 kg/m².  Okay to try GLP1 agonist if covered by insurance. Continue diet/exercise efforts     Other Visit Diagnoses       Annual physical exam    -  Preventative cares discussed and updated as needed. Lifestyle modifications discussed as needed.                Follow up in about 1 year (around 4/9/2025) for annual.      RTC/ED precautions discussed where applicable.   Encouraged patient to let me know if there are any further questions/concerns.     Advise patient/caretaker to check with insurance regarding orders to avoid unexpected fees/costs.     The patient/caretaker indicates understanding of these issues and agrees with the plan.    Dr. Allen Rao MD  Family Medicine

## 2024-04-09 NOTE — PATIENT INSTRUCTIONS
High intensity interval training (HIIT)    High thermic foods - foods that burn calories as your body digests them  *eggs  *lean meat  *fish  *nuts    Intermittent fasting - 16 hours    Carb cycling

## 2024-04-16 DIAGNOSIS — K21.9 GASTROESOPHAGEAL REFLUX DISEASE WITHOUT ESOPHAGITIS: ICD-10-CM

## 2024-04-16 RX ORDER — PANTOPRAZOLE SODIUM 40 MG/1
40 TABLET, DELAYED RELEASE ORAL DAILY
Qty: 90 TABLET | Refills: 3 | Status: SHIPPED | OUTPATIENT
Start: 2024-04-16 | End: 2025-04-16

## 2024-04-16 NOTE — TELEPHONE ENCOUNTER
Refill Routing Note   Medication(s) are not appropriate for processing by [facility] for the following reason(s):        No active prescription written by provider    [facility] action(s):  Defer     Requires labs : Yes      Medication Therapy Plan: FOVS      Appointments  past 12m or future 3m with PCP    Date Provider   Last Visit   4/9/2024 Allen Rao MD   Next Visit   10/10/2024 Allen Rao MD   ED visits in past 90 days: 0        Note composed:11:09 AM 04/16/2024

## 2024-04-16 NOTE — TELEPHONE ENCOUNTER
Care Due:                  Date            Visit Type   Department     Provider  --------------------------------------------------------------------------------                                             [facility]  Last Visit: 04-      [facility] Melba Rao                              [facility]  Next Visit: 10-      [facility]   [facility]                                                            Last  Test          Frequency    Reason                     Performed    Due Date  --------------------------------------------------------------------------------    HBA1C.......  6 months...  tirzepatide,.............  05- 11-    [facility] Embedded Care Due Messages. Reference number: [id number].   4/16/2024 8:45:37 AM CDT

## 2024-04-24 RX ORDER — INDOMETHACIN 50 MG/1
50 CAPSULE ORAL 2 TIMES DAILY PRN
Qty: 60 CAPSULE | Refills: 11 | Status: SHIPPED | OUTPATIENT
Start: 2024-04-24 | End: 2024-05-07 | Stop reason: SDUPTHER

## 2024-04-24 NOTE — TELEPHONE ENCOUNTER
No care due was identified.  [facility] Embedded Care Due Messages. Reference number: [id number].   4/23/2024 7:24:06 PM CDT

## 2024-04-25 RX ORDER — DULOXETIN HYDROCHLORIDE 30 MG/1
30 CAPSULE, DELAYED RELEASE ORAL
Qty: 90 CAPSULE | Refills: 3 | Status: SHIPPED | OUTPATIENT
Start: 2024-04-25 | End: 2024-05-19 | Stop reason: SDUPTHER

## 2024-04-25 NOTE — TELEPHONE ENCOUNTER
No care due was identified.  [facility] Embedded Care Due Messages. Reference number: [id number].   4/24/2024 11:39:29 PM CDT

## 2024-04-25 NOTE — TELEPHONE ENCOUNTER
Refill Routing Note   Medication(s) are not appropriate for processing by [facility] for the following reason(s):        No active prescription written by provider    [facility] action(s):  Defer               Appointments  past 12m or future 3m with PCP    Date Provider   Last Visit   4/9/2024 Allen Rao MD   Next Visit   10/10/2024 Allen Rao MD   ED visits in past 90 days: 0        Note composed:6:52 AM 04/25/2024

## 2024-05-01 ENCOUNTER — PATIENT MESSAGE (OUTPATIENT)
Dept: FAMILY MEDICINE | Facility: CLINIC | Age: 59
End: 2024-05-01
Payer: COMMERCIAL

## 2024-05-01 DIAGNOSIS — K59.09 CHRONIC CONSTIPATION: Primary | ICD-10-CM

## 2024-05-06 ENCOUNTER — OFFICE VISIT (OUTPATIENT)
Dept: RHEUMATOLOGY | Facility: CLINIC | Age: 59
End: 2024-05-06
Payer: COMMERCIAL

## 2024-05-06 ENCOUNTER — PATIENT MESSAGE (OUTPATIENT)
Dept: RHEUMATOLOGY | Facility: CLINIC | Age: 59
End: 2024-05-06

## 2024-05-06 VITALS
BODY MASS INDEX: 27.46 KG/M2 | HEIGHT: 68 IN | WEIGHT: 181.19 LBS | HEART RATE: 102 BPM | SYSTOLIC BLOOD PRESSURE: 117 MMHG | DIASTOLIC BLOOD PRESSURE: 74 MMHG

## 2024-05-06 DIAGNOSIS — M06.00 SERONEGATIVE RHEUMATOID ARTHRITIS: Primary | ICD-10-CM

## 2024-05-06 DIAGNOSIS — D84.821 IMMUNODEFICIENCY DUE TO LONG TERM DRUG THERAPY: ICD-10-CM

## 2024-05-06 DIAGNOSIS — Z79.899 IMMUNODEFICIENCY DUE TO LONG TERM DRUG THERAPY: ICD-10-CM

## 2024-05-06 PROCEDURE — 1159F MED LIST DOCD IN RCRD: CPT | Mod: CPTII,S$GLB,, | Performed by: STUDENT IN AN ORGANIZED HEALTH CARE EDUCATION/TRAINING PROGRAM

## 2024-05-06 PROCEDURE — 99214 OFFICE O/P EST MOD 30 MIN: CPT | Mod: S$GLB,,, | Performed by: STUDENT IN AN ORGANIZED HEALTH CARE EDUCATION/TRAINING PROGRAM

## 2024-05-06 PROCEDURE — 99999 PR PBB SHADOW E&M-EST. PATIENT-LVL IV: CPT | Mod: PBBFAC,,, | Performed by: STUDENT IN AN ORGANIZED HEALTH CARE EDUCATION/TRAINING PROGRAM

## 2024-05-06 PROCEDURE — 3074F SYST BP LT 130 MM HG: CPT | Mod: CPTII,S$GLB,, | Performed by: STUDENT IN AN ORGANIZED HEALTH CARE EDUCATION/TRAINING PROGRAM

## 2024-05-06 PROCEDURE — 3078F DIAST BP <80 MM HG: CPT | Mod: CPTII,S$GLB,, | Performed by: STUDENT IN AN ORGANIZED HEALTH CARE EDUCATION/TRAINING PROGRAM

## 2024-05-06 PROCEDURE — 3008F BODY MASS INDEX DOCD: CPT | Mod: CPTII,S$GLB,, | Performed by: STUDENT IN AN ORGANIZED HEALTH CARE EDUCATION/TRAINING PROGRAM

## 2024-05-06 NOTE — PATIENT INSTRUCTIONS
Take plaquenil once a day for a month and carefully monitor if any worsening symptoms. If no changes in symptoms, stop plaquenil completely and monitor symptoms too. Let me know if any changes   Labs in June 2024  Follow up in 6 months

## 2024-05-06 NOTE — PROGRESS NOTES
Subjective:      Patient ID: Carly Carmen is a 57 y.o. female.    Chief Complaint: Disease Management and Establish Care.     HPI     58 year old F with no significant PMH here for follow up in RA    She is new to me. Used to follow with Dr. Velarde at [facility]. Last visit in 5/2023.     She started to have joint pain beginning of September 2022. She has pain in shoulders, wrists, and hands.  She gets swelling in hands and wrists for the last several months. She is stiff in hands  and wrists all day.  She takes indomethacin 50 mg every 6 months.She is taking plaquenil 200mg po BID for last month.  Pain level is 8/10. She has trouble getting dressed, opening door knob, and driving.  Denies any rashes, oral ulcers, or alopecia.  She smoked 1 ppd for about 30 years. She quit smoking about 4 years ago and now is vaping.    MRI bilateral hands 2/2023:  1. Constellation of findings most suggestive of sequela of rheumatoid arthritis with moderate to severe disease activity and high risk of future aggressiveness.    She has hx of one episode of hydradenitis suppurative in groin that required excision in 2016.     Family hx: negative for RA or SLE     Interval history  -Last visit with me on 10/2023  -We increased Humira to every 10 days and increase HCQ to 200 mg BID.   -She has felt improvement on her symptoms, with occasional soreness in ankles and wrist in the morning that last for about 5 minutes.   -She takes indocin 50 mg at night as needed.   -denies side effects or injection reaction from Humira.   -Labs in 1/2024 were normal.  -repeat CT chest showed stable findings that are likely related to prior hx of smoking. No further workup needed except follow up CT in 6 months.     Answers submitted by the patient for this visit:  Rheumatology Questionnaire (Submitted on 5/2/2024)  fever: No  eye redness: No  mouth sores: No  headaches: No  shortness of breath: No  chest pain: No  trouble swallowing: No  diarrhea:  No  constipation: No  unexpected weight change: No  genital sore: No  dysuria: No  During the last 3 days, have you had a skin rash?: No  Bruises or bleeds easily: No  cough: No    Past Medical History:   Diagnosis Date    Abnormal Pap smear of cervix years ago    colpo done    Allergy     Chronic hip pain     Hidradenitis suppurativa 4/8/2016       Review of Systems   Constitutional:  Negative for activity change, appetite change, chills, diaphoresis, fatigue and fever.   HENT:  Negative for congestion, ear discharge, ear pain, facial swelling, mouth sores, sinus pressure, sneezing, sore throat, tinnitus and trouble swallowing.    Eyes:  Negative for photophobia, pain, discharge, redness, itching and visual disturbance.   Respiratory:  Negative for apnea, cough, chest tightness, shortness of breath, wheezing and stridor.    Cardiovascular:  Negative for chest pain and leg swelling.   Gastrointestinal:  Negative for abdominal distention, abdominal pain, anal bleeding, blood in stool, constipation, diarrhea and nausea.   Endocrine: Negative for cold intolerance and heat intolerance.   Genitourinary:  Negative for difficulty urinating, dysuria and genital sores.   Musculoskeletal:  Positive for arthralgias, back pain, gait problem and joint swelling. Negative for myalgias, neck pain and neck stiffness.   Skin:  Negative for color change, pallor, rash and wound.   Neurological:  Negative for dizziness, seizures, light-headedness, numbness and headaches.   Hematological:  Negative for adenopathy. Does not bruise/bleed easily.   Psychiatric/Behavioral:  Negative for sleep disturbance. The patient is not nervous/anxious.          Objective:     Vitals:    05/06/24 1447   BP: 117/74   Pulse: 102     Physical Exam   Constitutional: She is oriented to person, place, and time.   HENT:   Head: Normocephalic and atraumatic.   Nose: Nose normal.   Mouth/Throat: Oropharynx is clear and moist.  Neck: Supple  Pulmonary/Chest:  Effort normal   Musculoskeletal:         General: No tenderness.      Cervical back: Neck supple.      No synovitis noted on examination today.   Lymphadenopathy:     She has no cervical adenopathy.   Neurological: She is alert and oriented to person, place, and time. No cranial nerve deficit.   Skin: Skin is dry. No bruising and no rash noted. No erythema.   Psychiatric: Affect and judgment normal.        Assessment:   58 year old F with no significant PMH here for follow up of seronegative RA.  Clinical exam consistent with RA. Humira started on 5/2023 due to high disease activity which has helped significantly but effect was wearing off at 10 days. She was also taking  mg daily (under dosed). Symptoms improved after changing Humira to every 10 days and increasing HCQ to 200 mg BID. Her symptoms are likely improved due to Humira rather than HCQ so will do trial of taper and assess response   She is to follow up with pulmonary for abnormal CT chest.   DEXA scan 12/2021 normal    Plan:     Continue Humira 40 mg SC every 10 days  Decrease HCQ to 200 mg daily for 1 month. If no changes in symptoms, stop completely and reassess. If worsening symptoms, she will resume immediately and let me know  Hold off on referral to Ophthalmology until above.   Labs in June 2024.   In the future can consider other TNFs, IL-6 or Antonio  Will address bone health during next visit. She will need repeat DEXA scan after 12/2023     20 minutes of total time spent on the encounter, which includes face to face time and non-face to face time preparing to see the patient (eg, review of tests), Obtaining and/or reviewing separately obtained history, Documenting clinical information in the electronic or other health record, Independently interpreting results (not separately reported) and communicating results to the patient/family/caregiver, or Care coordination (not separately reported).       Argentina Phan M.D.  Rheumatology  Department   [facility]

## 2024-05-06 NOTE — PROGRESS NOTES
5/2/2024     1:32 PM   Rapid3 Question Responses and Scores   MDHAQ Score 0   Psychologic Score 0   Pain Score 0   When you awakened in the morning OVER THE LAST WEEK, did you feel stiff? Yes   If Yes, please indicate the number of hours until you are as limber as you will be for the day 1   Fatigue Score 0   Global Health Score 0.5   RAPID3 Score 0.17

## 2024-05-07 RX ORDER — INDOMETHACIN 50 MG/1
50 CAPSULE ORAL DAILY PRN
Qty: 90 CAPSULE | Refills: 1 | Status: SHIPPED | OUTPATIENT
Start: 2024-05-07

## 2024-05-08 ENCOUNTER — PATIENT MESSAGE (OUTPATIENT)
Dept: FAMILY MEDICINE | Facility: CLINIC | Age: 59
End: 2024-05-08
Payer: COMMERCIAL

## 2024-05-08 ENCOUNTER — PATIENT MESSAGE (OUTPATIENT)
Dept: RHEUMATOLOGY | Facility: CLINIC | Age: 59
End: 2024-05-08
Payer: COMMERCIAL

## 2024-05-09 RX ORDER — PHENTERMINE HYDROCHLORIDE 15 MG/1
15 CAPSULE ORAL EVERY MORNING
Qty: 30 CAPSULE | Refills: 0 | Status: SHIPPED | OUTPATIENT
Start: 2024-05-09 | End: 2024-06-08

## 2024-05-19 ENCOUNTER — PATIENT MESSAGE (OUTPATIENT)
Dept: FAMILY MEDICINE | Facility: CLINIC | Age: 59
End: 2024-05-19
Payer: COMMERCIAL

## 2024-05-19 NOTE — TELEPHONE ENCOUNTER
No care due was identified.  [facility] Embedded Care Due Messages. Reference number: [id number].   5/19/2024 4:57:37 PM CDT

## 2024-05-20 RX ORDER — DULOXETIN HYDROCHLORIDE 30 MG/1
30 CAPSULE, DELAYED RELEASE ORAL DAILY
Qty: 90 CAPSULE | Refills: 3 | Status: SHIPPED | OUTPATIENT
Start: 2024-05-20

## 2024-06-10 ENCOUNTER — PATIENT MESSAGE (OUTPATIENT)
Dept: FAMILY MEDICINE | Facility: CLINIC | Age: 59
End: 2024-06-10
Payer: COMMERCIAL

## 2024-06-11 ENCOUNTER — OFFICE VISIT (OUTPATIENT)
Dept: FAMILY MEDICINE | Facility: CLINIC | Age: 59
End: 2024-06-11
Payer: COMMERCIAL

## 2024-06-11 VITALS
OXYGEN SATURATION: 96 % | SYSTOLIC BLOOD PRESSURE: 138 MMHG | HEART RATE: 84 BPM | WEIGHT: 183.44 LBS | HEIGHT: 68 IN | DIASTOLIC BLOOD PRESSURE: 78 MMHG | BODY MASS INDEX: 27.8 KG/M2

## 2024-06-11 DIAGNOSIS — E78.2 MIXED HYPERLIPIDEMIA: Primary | Chronic | ICD-10-CM

## 2024-06-11 DIAGNOSIS — Z79.899 IMMUNODEFICIENCY DUE TO LONG TERM DRUG THERAPY: Chronic | ICD-10-CM

## 2024-06-11 DIAGNOSIS — I70.0 AORTIC ATHEROSCLEROSIS: Chronic | ICD-10-CM

## 2024-06-11 DIAGNOSIS — D84.821 IMMUNODEFICIENCY DUE TO LONG TERM DRUG THERAPY: Chronic | ICD-10-CM

## 2024-06-11 PROCEDURE — 99214 OFFICE O/P EST MOD 30 MIN: CPT | Mod: S$GLB,,, | Performed by: FAMILY MEDICINE

## 2024-06-11 PROCEDURE — 3075F SYST BP GE 130 - 139MM HG: CPT | Mod: CPTII,S$GLB,, | Performed by: FAMILY MEDICINE

## 2024-06-11 PROCEDURE — 99999 PR PBB SHADOW E&M-EST. PATIENT-LVL IV: CPT | Mod: PBBFAC,,, | Performed by: FAMILY MEDICINE

## 2024-06-11 PROCEDURE — 1160F RVW MEDS BY RX/DR IN RCRD: CPT | Mod: CPTII,S$GLB,, | Performed by: FAMILY MEDICINE

## 2024-06-11 PROCEDURE — 3078F DIAST BP <80 MM HG: CPT | Mod: CPTII,S$GLB,, | Performed by: FAMILY MEDICINE

## 2024-06-11 PROCEDURE — 1159F MED LIST DOCD IN RCRD: CPT | Mod: CPTII,S$GLB,, | Performed by: FAMILY MEDICINE

## 2024-06-11 PROCEDURE — 3008F BODY MASS INDEX DOCD: CPT | Mod: CPTII,S$GLB,, | Performed by: FAMILY MEDICINE

## 2024-06-11 RX ORDER — PHENTERMINE HYDROCHLORIDE 37.5 MG/1
37.5 TABLET ORAL
Qty: 30 TABLET | Refills: 0 | Status: SHIPPED | OUTPATIENT
Start: 2024-06-11

## 2024-06-11 NOTE — PATIENT INSTRUCTIONS
High intensity interval training videos     [email or web address]    Make sure you are getting enough protein - 60-100g    Make sure you are getting enough healthy carbs   -chickpeas  -lentils  -multigrain bread

## 2024-06-19 ENCOUNTER — PATIENT MESSAGE (OUTPATIENT)
Dept: FAMILY MEDICINE | Facility: CLINIC | Age: 59
End: 2024-06-19
Payer: COMMERCIAL

## 2024-07-11 DIAGNOSIS — I70.0 AORTIC ATHEROSCLEROSIS: ICD-10-CM

## 2024-07-11 DIAGNOSIS — E78.2 MIXED HYPERLIPIDEMIA: ICD-10-CM

## 2024-07-11 RX ORDER — ATORVASTATIN CALCIUM 20 MG/1
TABLET, FILM COATED ORAL
Qty: 90 TABLET | Refills: 3 | Status: SHIPPED | OUTPATIENT
Start: 2024-07-11

## 2024-07-11 NOTE — TELEPHONE ENCOUNTER
Care Due:                  Date            Visit Type   Department     Provider  --------------------------------------------------------------------------------                                EP -                              PRIMARY      [facility]  Last Visit: 06-      [facility]   [facility]                               -                              PRIMARY      [facility]  Next Visit: 09-      [facility] ([facility])   [facility]                                                            Last  Test          Frequency    Reason                     Performed    Due Date  --------------------------------------------------------------------------------    Lipid Panel.  12 months..  atorvastatin.............  08- 08-    [facility] Embedded Care Due Messages. Reference number: [id number].   7/11/2024 12:12:02 AM CDT

## 2024-07-11 NOTE — TELEPHONE ENCOUNTER
Refill Routing Note   Medication(s) are not appropriate for processing by [facility] for the following reason(s):        No active prescription written by provider    [facility] action(s):  Defer     Requires labs : Yes             Appointments  past 12m or future 3m with PCP    Date Provider   Last Visit   6/11/2024 Allen Rao MD   Next Visit   9/11/2024 Allen Rao MD   ED visits in past 90 days: 0        Note composed:5:03 AM 07/11/2024

## 2024-07-17 ENCOUNTER — PATIENT MESSAGE (OUTPATIENT)
Dept: OBSTETRICS AND GYNECOLOGY | Facility: CLINIC | Age: 59
End: 2024-07-17
Payer: COMMERCIAL

## 2024-07-17 DIAGNOSIS — Z79.890 HORMONE REPLACEMENT THERAPY (HRT): ICD-10-CM

## 2024-07-17 DIAGNOSIS — M06.00 SERONEGATIVE RHEUMATOID ARTHRITIS: ICD-10-CM

## 2024-07-17 RX ORDER — ESTERIFIED ESTROGEN AND METHYLTESTOSTERONE .625; 1.25 MG/1; MG/1
1 TABLET ORAL DAILY
Qty: 90 TABLET | Refills: 3 | Status: SHIPPED | OUTPATIENT
Start: 2024-07-17

## 2024-07-17 NOTE — TELEPHONE ENCOUNTER
Refill Routing Note   Medication(s) are not appropriate for processing by [facility] for the following reason(s):        Outside of protocol    [facility] action(s):  Route               Appointments  past 12m or future 3m with PCP    Date Provider   Last Visit   7/12/2023 Ruthie Block MD   Next Visit   Visit date not found Ruthie Block MD   ED visits in past 90 days: 0        Note composed:1:28 PM 07/17/2024

## 2024-07-18 RX ORDER — INDOMETHACIN 50 MG/1
50 CAPSULE ORAL DAILY PRN
Qty: 90 CAPSULE | Refills: 1 | Status: SHIPPED | OUTPATIENT
Start: 2024-07-18

## 2024-07-24 RX ORDER — PHENTERMINE HYDROCHLORIDE 37.5 MG/1
37.5 TABLET ORAL
Qty: 30 TABLET | Refills: 0 | Status: SHIPPED | OUTPATIENT
Start: 2024-07-24

## 2024-08-29 ENCOUNTER — PATIENT MESSAGE (OUTPATIENT)
Dept: RHEUMATOLOGY | Facility: CLINIC | Age: 59
End: 2024-08-29
Payer: COMMERCIAL

## 2024-09-07 NOTE — TELEPHONE ENCOUNTER
No care due was identified.  [facility] Embedded Care Due Messages. Reference number: [id number].   9/07/2024 4:33:14 PM CDT

## 2024-09-09 ENCOUNTER — PATIENT MESSAGE (OUTPATIENT)
Dept: FAMILY MEDICINE | Facility: CLINIC | Age: 59
End: 2024-09-09
Payer: COMMERCIAL

## 2024-09-09 RX ORDER — TIZANIDINE 2 MG/1
2 TABLET ORAL 2 TIMES DAILY
Qty: 90 TABLET | Refills: 0 | Status: SHIPPED | OUTPATIENT
Start: 2024-09-09

## 2024-09-09 NOTE — TELEPHONE ENCOUNTER
[facility]. Patient's next appointment is 10/09.

## 2024-09-10 RX ORDER — PHENTERMINE HYDROCHLORIDE 37.5 MG/1
37.5 TABLET ORAL
Qty: 30 TABLET | Refills: 0 | Status: SHIPPED | OUTPATIENT
Start: 2024-09-10

## 2024-09-11 RX ORDER — PHENTERMINE HYDROCHLORIDE 37.5 MG/1
37.5 TABLET ORAL
Qty: 30 TABLET | Refills: 0 | Status: SHIPPED | OUTPATIENT
Start: 2024-09-11

## 2024-09-21 ENCOUNTER — PATIENT MESSAGE (OUTPATIENT)
Dept: RHEUMATOLOGY | Facility: CLINIC | Age: 59
End: 2024-09-21
Payer: COMMERCIAL

## 2024-09-24 ENCOUNTER — OFFICE VISIT (OUTPATIENT)
Dept: RHEUMATOLOGY | Facility: CLINIC | Age: 59
End: 2024-09-24
Payer: COMMERCIAL

## 2024-09-24 VITALS
DIASTOLIC BLOOD PRESSURE: 99 MMHG | HEART RATE: 90 BPM | WEIGHT: 184.06 LBS | SYSTOLIC BLOOD PRESSURE: 164 MMHG | HEIGHT: 68 IN | BODY MASS INDEX: 27.9 KG/M2

## 2024-09-24 DIAGNOSIS — M06.00 SERONEGATIVE RHEUMATOID ARTHRITIS: Primary | ICD-10-CM

## 2024-09-24 DIAGNOSIS — R93.89 ABNORMAL CT OF THE CHEST: ICD-10-CM

## 2024-09-24 DIAGNOSIS — Z79.899 IMMUNODEFICIENCY DUE TO LONG TERM DRUG THERAPY: ICD-10-CM

## 2024-09-24 DIAGNOSIS — D84.821 IMMUNODEFICIENCY DUE TO LONG TERM DRUG THERAPY: ICD-10-CM

## 2024-09-24 PROCEDURE — 99999 PR PBB SHADOW E&M-EST. PATIENT-LVL III: CPT | Mod: PBBFAC,,, | Performed by: STUDENT IN AN ORGANIZED HEALTH CARE EDUCATION/TRAINING PROGRAM

## 2024-09-24 PROCEDURE — 3080F DIAST BP >= 90 MM HG: CPT | Mod: CPTII,S$GLB,, | Performed by: STUDENT IN AN ORGANIZED HEALTH CARE EDUCATION/TRAINING PROGRAM

## 2024-09-24 PROCEDURE — 1159F MED LIST DOCD IN RCRD: CPT | Mod: CPTII,S$GLB,, | Performed by: STUDENT IN AN ORGANIZED HEALTH CARE EDUCATION/TRAINING PROGRAM

## 2024-09-24 PROCEDURE — 96372 THER/PROPH/DIAG INJ SC/IM: CPT | Mod: S$GLB,,, | Performed by: STUDENT IN AN ORGANIZED HEALTH CARE EDUCATION/TRAINING PROGRAM

## 2024-09-24 PROCEDURE — 3077F SYST BP >= 140 MM HG: CPT | Mod: CPTII,S$GLB,, | Performed by: STUDENT IN AN ORGANIZED HEALTH CARE EDUCATION/TRAINING PROGRAM

## 2024-09-24 PROCEDURE — 99215 OFFICE O/P EST HI 40 MIN: CPT | Mod: 25,S$GLB,, | Performed by: STUDENT IN AN ORGANIZED HEALTH CARE EDUCATION/TRAINING PROGRAM

## 2024-09-24 PROCEDURE — 3008F BODY MASS INDEX DOCD: CPT | Mod: CPTII,S$GLB,, | Performed by: STUDENT IN AN ORGANIZED HEALTH CARE EDUCATION/TRAINING PROGRAM

## 2024-09-24 RX ORDER — KETOROLAC TROMETHAMINE 30 MG/ML
30 INJECTION, SOLUTION INTRAMUSCULAR; INTRAVENOUS
Status: COMPLETED | OUTPATIENT
Start: 2024-09-24 | End: 2024-09-24

## 2024-09-24 RX ORDER — HYDROXYCHLOROQUINE SULFATE 200 MG/1
200 TABLET, FILM COATED ORAL 2 TIMES DAILY
Qty: 60 TABLET | Refills: 5 | Status: SHIPPED | OUTPATIENT
Start: 2024-09-24

## 2024-09-24 RX ADMIN — KETOROLAC TROMETHAMINE 30 MG: 30 INJECTION, SOLUTION INTRAMUSCULAR; INTRAVENOUS at 09:09

## 2024-09-24 NOTE — PROGRESS NOTES
9/23/2024     5:27 PM   Rapid3 Question Responses and Scores   MDHAQ Score 1   Psychologic Score 1.1   Pain Score 10   When you awakened in the morning OVER THE LAST WEEK, did you feel stiff? Yes   If Yes, please indicate the number of hours until you are as limber as you will be for the day 24   Fatigue Score 7   Global Health Score 8   RAPID3 Score 7.11

## 2024-09-24 NOTE — PROGRESS NOTES
Subjective:      Patient ID: Carly Carmen is a 57 y.o. female.    Chief Complaint: Disease Management and Establish Care.     HPI     59 y.o.  F with no significant PMH here for follow up in RA    She is new to me. Used to follow with Dr. Velarde at [facility]. Last visit in 5/2023.     She started to have joint pain beginning of September 2022. She has pain in shoulders, wrists, and hands.  She gets swelling in hands and wrists for the last several months. She is stiff in hands  and wrists all day.  She takes indomethacin 50 mg every 6 months.She is taking plaquenil 200mg po BID for last month.  Pain level is 8/10. She has trouble getting dressed, opening door knob, and driving.  Denies any rashes, oral ulcers, or alopecia.  She smoked 1 ppd for about 30 years. She quit smoking about 4 years ago and now is vaping.    MRI bilateral hands 2/2023:  1. Constellation of findings most suggestive of sequela of rheumatoid arthritis with moderate to severe disease activity and high risk of future aggressiveness.    She has hx of one episode of hydradenitis suppurative in groin that required excision in 2016.     Family hx: negative for RA or SLE     Interval history  -Last visit on 5/2024  -We tapered off HCQ to once daily since last visit.   -She is on Humira 40 mg SC Q10 days.   -2 weeks ago she developed pain and swelling in right 2nd MCP, wrists and shoulder. Improved with indocin and tizanidine which helped her symptoms. However now she is having pain in left shoulder, unable to lift her arm that started about 3 days ago. She took indocin and tizanidine and it helped somewhat. She feels that flare is improving already.   -she has noted that she has to take indocin 50 mg QHS as if she misses it, she wakes up stiff.   -denies any side effects or injection reaction from Humira.    Answers submitted by the patient for this visit:  Rheumatology Questionnaire (Submitted on 9/23/2024)  fever: No  eye redness: No  mouth  sores: No  headaches: No  shortness of breath: No  chest pain: No  trouble swallowing: No  diarrhea: No  constipation: No  unexpected weight change: No  genital sore: No  dysuria: No  During the last 3 days, have you had a skin rash?: No  Bruises or bleeds easily: No  cough: No    Past Medical History:   Diagnosis Date    Abnormal Pap smear of cervix years ago    colpo done    Allergy     Chronic hip pain     Hidradenitis suppurativa 4/8/2016     Review of Systems   Constitutional:  Negative for activity change, appetite change, chills, diaphoresis, fatigue and fever.   HENT:  Negative for congestion, ear discharge, ear pain, facial swelling, mouth sores, sinus pressure, sneezing, sore throat, tinnitus and trouble swallowing.    Eyes:  Negative for photophobia, pain, discharge, redness, itching and visual disturbance.   Respiratory:  Negative for apnea, cough, chest tightness, shortness of breath, wheezing and stridor.    Cardiovascular:  Negative for chest pain and leg swelling.   Gastrointestinal:  Negative for abdominal distention, abdominal pain, anal bleeding, blood in stool, constipation, diarrhea and nausea.   Endocrine: Negative for cold intolerance and heat intolerance.   Genitourinary:  Negative for difficulty urinating, dysuria and genital sores.   Musculoskeletal:  Positive for arthralgias, back pain, gait problem and joint swelling. Negative for myalgias, neck pain and neck stiffness.   Skin:  Negative for color change, pallor, rash and wound.   Neurological:  Negative for dizziness, seizures, light-headedness, numbness and headaches.   Hematological:  Negative for adenopathy. Does not bruise/bleed easily.   Psychiatric/Behavioral:  Negative for sleep disturbance. The patient is not nervous/anxious.          Objective:     Vitals:    09/24/24 0845   BP: (!) 164/99   Pulse: 90     Physical Exam   Constitutional: She is oriented to person, place, and time.   HENT:   Head: Normocephalic and atraumatic.    Nose: Nose normal.   Mouth/Throat: Oropharynx is clear and moist.  Neck: Supple  Pulmonary/Chest: Effort normal   Musculoskeletal:         General: No tenderness.      Cervical back: Neck supple.      Right 2nd MCP, right wrist with mild synovitis. Right shoulder with normal ROM     Left shoulder with positive signs of impingement.   Lymphadenopathy:     She has no cervical adenopathy.   Neurological: She is alert and oriented to person, place, and time. No cranial nerve deficit.   Skin: Skin is dry. No bruising and no rash noted. No erythema.   Psychiatric: Affect and judgment normal.        Assessment:   59 y.o.  F with no significant PMH here for follow up of seronegative RA.  Clinical exam consistent with RA. Humira started on 5/2023 due to high disease activity which has helped significantly but effect was wearing off at 10 days. She was also taking  mg daily (under dosed). Symptoms improved after changing Humira to every 10 days and increasing HCQ to 200 mg BID. We tapered HCQ to 200 mg daily after last visit as she was stable.   Has been having a flare for the past 2 weeks involving multiple joints. Declined steroids due to weight gain.     She is to follow up with pulmonary for abnormal CT chest.     DEXA scan 12/2021 normal    Drug induced immunodeficiency  No live vaccines. Vaccines per guidelines. immunosuppression/infectious precautions reinforced      Plan:   Change Humira 40 mg SC every 7 days  Increase HCQ back to 200 mg BID   Ketorolac IM now.   Continue indomethacin as needed but will do BID for 5 days due to flare. She will resume tomorrow night   Labs now to monitor for humira and plaquenil.     In the future can consider other TNFs, IL-6 or Antonio  Will address bone health during next visit. She will need repeat DEXA scan after 12/2023     Follow up in about 3 months (around 12/24/2024).     Method of contact with patient concerns: [employer] attn Rheumatology    Disclaimer:  This note is  prepared using voice recognition software and as such is likely to have errors and has not been proof read. Please contact me for questions.     Time spent: 30 minutes in face to face discussion concerning diagnosis, prognosis, review of lab and test results, benefits of treatment as well as management of disease, counseling of patient and coordination of care between various health care providers.  Greater than half the time spent was used for coordination of care and counseling of patient.    Argentina Phan M.D.  Rheumatology Department   [facility]

## 2024-09-24 NOTE — PATIENT INSTRUCTIONS
Increase hydroxychloroquine to 1 tablet twice a day.   Change humira injection to every 7 days   Take indocin twice a day for the next 5 days starting Wednesday night   Do labs now   Follow up in 3 months or sooner if needed

## 2024-10-05 DIAGNOSIS — M06.00 SERONEGATIVE RHEUMATOID ARTHRITIS: ICD-10-CM

## 2024-10-07 RX ORDER — HYDROXYCHLOROQUINE SULFATE 200 MG/1
200 TABLET, FILM COATED ORAL 2 TIMES DAILY
Qty: 60 TABLET | Refills: 5 | OUTPATIENT
Start: 2024-10-07

## 2024-10-09 ENCOUNTER — TELEPHONE (OUTPATIENT)
Dept: FAMILY MEDICINE | Facility: CLINIC | Age: 59
End: 2024-10-09

## 2024-10-09 ENCOUNTER — PATIENT MESSAGE (OUTPATIENT)
Dept: RHEUMATOLOGY | Facility: CLINIC | Age: 59
End: 2024-10-09
Payer: COMMERCIAL

## 2024-10-09 ENCOUNTER — OFFICE VISIT (OUTPATIENT)
Dept: FAMILY MEDICINE | Facility: CLINIC | Age: 59
End: 2024-10-09
Payer: COMMERCIAL

## 2024-10-09 VITALS
SYSTOLIC BLOOD PRESSURE: 138 MMHG | WEIGHT: 184.19 LBS | BODY MASS INDEX: 27.92 KG/M2 | DIASTOLIC BLOOD PRESSURE: 82 MMHG | HEIGHT: 68 IN

## 2024-10-09 DIAGNOSIS — I70.0 AORTIC ATHEROSCLEROSIS: Chronic | ICD-10-CM

## 2024-10-09 DIAGNOSIS — K21.9 GASTROESOPHAGEAL REFLUX DISEASE WITHOUT ESOPHAGITIS: ICD-10-CM

## 2024-10-09 DIAGNOSIS — Z79.899 IMMUNODEFICIENCY DUE TO LONG TERM DRUG THERAPY: Chronic | ICD-10-CM

## 2024-10-09 DIAGNOSIS — M06.00 SERONEGATIVE RHEUMATOID ARTHRITIS: ICD-10-CM

## 2024-10-09 DIAGNOSIS — Z79.890 HORMONE REPLACEMENT THERAPY (HRT): ICD-10-CM

## 2024-10-09 DIAGNOSIS — D84.821 IMMUNODEFICIENCY DUE TO LONG TERM DRUG THERAPY: Chronic | ICD-10-CM

## 2024-10-09 DIAGNOSIS — M06.00 SERONEGATIVE RHEUMATOID ARTHRITIS: Chronic | ICD-10-CM

## 2024-10-09 DIAGNOSIS — E78.2 MIXED HYPERLIPIDEMIA: Primary | Chronic | ICD-10-CM

## 2024-10-09 PROCEDURE — 3079F DIAST BP 80-89 MM HG: CPT | Mod: CPTII,S$GLB,, | Performed by: FAMILY MEDICINE

## 2024-10-09 PROCEDURE — 1159F MED LIST DOCD IN RCRD: CPT | Mod: CPTII,S$GLB,, | Performed by: FAMILY MEDICINE

## 2024-10-09 PROCEDURE — 99999 PR PBB SHADOW E&M-EST. PATIENT-LVL III: CPT | Mod: PBBFAC,,, | Performed by: FAMILY MEDICINE

## 2024-10-09 PROCEDURE — 3008F BODY MASS INDEX DOCD: CPT | Mod: CPTII,S$GLB,, | Performed by: FAMILY MEDICINE

## 2024-10-09 PROCEDURE — 3075F SYST BP GE 130 - 139MM HG: CPT | Mod: CPTII,S$GLB,, | Performed by: FAMILY MEDICINE

## 2024-10-09 PROCEDURE — 1160F RVW MEDS BY RX/DR IN RCRD: CPT | Mod: CPTII,S$GLB,, | Performed by: FAMILY MEDICINE

## 2024-10-09 PROCEDURE — 99214 OFFICE O/P EST MOD 30 MIN: CPT | Mod: S$GLB,,, | Performed by: FAMILY MEDICINE

## 2024-10-09 RX ORDER — TIZANIDINE 2 MG/1
2 TABLET ORAL 2 TIMES DAILY
Qty: 90 TABLET | Refills: 0 | Status: SHIPPED | OUTPATIENT
Start: 2024-10-09

## 2024-10-09 RX ORDER — DULOXETIN HYDROCHLORIDE 30 MG/1
30 CAPSULE, DELAYED RELEASE ORAL DAILY
Qty: 90 CAPSULE | Refills: 3 | Status: SHIPPED | OUTPATIENT
Start: 2024-10-09

## 2024-10-09 RX ORDER — PANTOPRAZOLE SODIUM 40 MG/1
40 TABLET, DELAYED RELEASE ORAL DAILY
Qty: 90 TABLET | Refills: 3 | Status: SHIPPED | OUTPATIENT
Start: 2024-10-09 | End: 2025-10-09

## 2024-10-09 RX ORDER — HYDROXYCHLOROQUINE SULFATE 200 MG/1
200 TABLET, FILM COATED ORAL 2 TIMES DAILY
Qty: 60 TABLET | Refills: 5 | Status: SHIPPED | OUTPATIENT
Start: 2024-10-09

## 2024-10-09 RX ORDER — MEDROXYPROGESTERONE ACETATE 2.5 MG/1
2.5 TABLET ORAL DAILY
Qty: 90 TABLET | Refills: 3 | Status: SHIPPED | OUTPATIENT
Start: 2024-10-09

## 2024-10-09 RX ORDER — ESTERIFIED ESTROGEN AND METHYLTESTOSTERONE .625; 1.25 MG/1; MG/1
1 TABLET ORAL DAILY
Qty: 90 TABLET | Refills: 3 | Status: SHIPPED | OUTPATIENT
Start: 2024-10-09

## 2024-10-09 RX ORDER — ESTERIFIED ESTROGEN AND METHYLTESTOSTERONE .625; 1.25 MG/1; MG/1
1 TABLET ORAL DAILY
Qty: 90 TABLET | Refills: 3 | Status: CANCELLED | OUTPATIENT
Start: 2024-10-09

## 2024-10-09 NOTE — PROGRESS NOTES
PATIENT VISIT FAMILY MEDICINE    CC:   Chief Complaint   Patient presents with    Follow-up     Vaccine declined    Hypertension       HPI: Carly Carmen  is a 59 y.o. female:    Patient is known to me.  Patient presents routine follow up on chronic conditions    Patient doing well overall, taking medications as prescribed and with no acute concerns.   Taking adipex, has not lost weight but endorses feeling more energy throughout the day, likes being on it. No side effects.    Saw rheumatologist two weeks ago, has had 2 rheumatoid flares.    PMHX:   Past Medical History:   Diagnosis Date    Abnormal Pap smear of cervix years ago    colpo done    Allergy     Chronic hip pain     Hidradenitis suppurativa 2016       PSHX:   Past Surgical History:   Procedure Laterality Date    BREAST BIOPSY Left     BENIGN    COLONOSCOPY N/A 2016    Procedure: COLONOSCOPY;  Surgeon: Luis Santana Jr., MD;  Location: [facility];  Service: Endoscopy;  Laterality: N/A;    CYST REMOVAL      subcutaneous cyst to right chest wall    DILATION AND CURETTAGE OF UTERUS         FAMHX:   Family History   Problem Relation Name Age of Onset    Cancer Mother  69        Lung Cancer with Brain Mets -  72    Heart disease Father          defibrillator    No Known Problems Brother      No Known Problems Brother      Breast cancer Neg Hx      Colon cancer Neg Hx      Ovarian cancer Neg Hx         SOCHX:   Social History     Socioeconomic History    Marital status:     Number of children: 1   Tobacco Use    Smoking status: Every Day     Current packs/day: 0.50     Average packs/day: 0.5 packs/day for 30.0 years (15.0 ttl pk-yrs)     Types: Cigarettes, Vaping with nicotine     Passive exposure: Current    Smokeless tobacco: Never   Substance and Sexual Activity    Alcohol use: Yes     Alcohol/week: 8.0 standard drinks of alcohol     Types: 8 Glasses of wine per week     Comment: weekends    Drug use: Yes     Types:  Amphetamines    Sexual activity: Yes     Partners: Male     Birth control/protection: Post-menopausal     Comment:    Social History Narrative    She is from [place].  She works for [employer] as an .   She is twice .  Her first   in .  She remarried in .  She has a 25 year-old daughter from her first marriage, who is getting  in 2014.  She lives in [place] with her  and her 18-year-old step son.         Social Drivers of Health     Financial Resource Strain: Low Risk  (2024)    Overall Financial Resource Strain (CARDIA)     Difficulty of Paying Living Expenses: Not hard at all   Food Insecurity: No Food Insecurity (2024)    Hunger Vital Sign     Worried About Running Out of Food in the Last Year: Never true     Ran Out of Food in the Last Year: Never true   Transportation Needs: No Transportation Needs (2024)    PRAPARE - Transportation     Lack of Transportation (Medical): No     Lack of Transportation (Non-Medical): No   Physical Activity: Insufficiently Active (2024)    Exercise Vital Sign     Days of Exercise per Week: 1 day     Minutes of Exercise per Session: 20 min   Stress: No Stress Concern Present (2024)    [employer] - Occupational Stress Questionnaire     Feeling of Stress : Not at all   Housing Stability: Low Risk  (2024)    Housing Stability Vital Sign     Unable to Pay for Housing in the Last Year: No     Number of Places Lived in the Last Year: 1     Unstable Housing in the Last Year: No       ALLERGIES: Review of patient's allergies indicates:  No Known Allergies    MEDS:   Current Outpatient Medications:     adalimumab 40 mg/0.4 mL PnKt, Inject 0.4 mLs (40 mg total) into the skin every 7 days., Disp: 4 Pen, Rfl: 6    atorvastatin (LIPITOR) 20 MG tablet, TAKE 1 TABLET DAILY, Disp: 90 tablet, Rfl: 3    DULoxetine (CYMBALTA) 30 MG  "capsule, Take 1 capsule (30 mg total) by mouth once daily., Disp: 90 capsule, Rfl: 3    estrogens,esterified,-methyltestosterone 0.625-1.25mg (ESTRATEST HS) per tablet, Take 1 tablet by mouth once daily., Disp: 90 tablet, Rfl: 3    hydroxychloroquine (PLAQUENIL) 200 mg tablet, Take 1 tablet (200 mg total) by mouth 2 (two) times daily., Disp: 60 tablet, Rfl: 5    indomethacin (INDOCIN) 50 MG capsule, Take 1 capsule (50 mg total) by mouth daily as needed (pain)., Disp: 90 capsule, Rfl: 1    medroxyPROGESTERone (PROVERA) 2.5 MG tablet, Take 1 tablet (2.5 mg total) by mouth once daily., Disp: 90 tablet, Rfl: 3    pantoprazole (PROTONIX) 40 MG tablet, Take 1 tablet (40 mg total) by mouth once daily., Disp: 90 tablet, Rfl: 3    phentermine (ADIPEX-P) 37.5 mg tablet, Take 1 tablet (37.5 mg total) by mouth before breakfast., Disp: 30 tablet, Rfl: 0    phentermine (ADIPEX-P) 37.5 mg tablet, Take 1 tablet (37.5 mg total) by mouth before breakfast., Disp: 30 tablet, Rfl: 0    tiZANidine (ZANAFLEX) 2 MG tablet, Take 1 tablet (2 mg total) by mouth 2 (two) times daily., Disp: 90 tablet, Rfl: 0    OBJECTIVE:   Vitals:    10/09/24 0821   BP: 138/82   BP Location: Left arm   Patient Position: Sitting   Weight: 83.6 kg (184 lb 3.1 oz)   Height: 5' 8" (1.727 m)     Body mass index is 28.01 kg/m².      Physical Exam  Vitals and nursing note reviewed.   Constitutional:       Appearance: Normal appearance.   HENT:      Head: Normocephalic and atraumatic.   Eyes:      General: No scleral icterus.     Extraocular Movements: Extraocular movements intact.   Pulmonary:      Effort: Pulmonary effort is normal.   Neurological:      Mental Status: She is alert.           LABS:   A1C:  Recent Labs   Lab 05/19/23  1650   Hemoglobin A1C 5.6     CBC:  Recent Labs   Lab 09/24/24  0930   WBC 4.66   RBC 4.51   Hemoglobin 14.3   Hematocrit 40.6   Platelets 308   MCV 90   MCH 31.7 H   MCHC 35.2     CMP:  Recent Labs   Lab 09/24/24  0930   Glucose 108 "   Calcium 9.2   Albumin 3.6   Total Protein 7.2   Sodium 142   Potassium 3.7   CO2 26   Chloride 109   BUN 12   Creatinine 0.7   Alkaline Phosphatase 74   ALT 16   AST 16   Total Bilirubin 0.6     LIPIDS:  Recent Labs   Lab 08/23/23  1035   TSH 0.622   HDL 40   Cholesterol 127   Triglycerides 85   LDL Cholesterol 70.0   HDL/Cholesterol Ratio 31.5   Non-HDL Cholesterol 87   Total Cholesterol/HDL Ratio 3.2     TSH:  Recent Labs   Lab 08/23/23  1035   TSH 0.622         ASSESSMENT & PLAN:    Problem List Items Addressed This Visit          Cardiac/Vascular    Aortic atherosclerosis (Chronic)    Overview     Seen on 03/13/2023 CT chest.  Known hyperlipidemia.  On atorvastatin.         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  JUSTINO, RF, CCP negative but with polyarthralgias and reportedly clinically consistent with RA.  Continue adalimumab. Patient at greater risk for opportunistic infections         Seronegative rheumatoid arthritis (Chronic)    Overview     Stable. Continue current regimen. Followed by Rheumatology         Relevant Medications    DULoxetine (CYMBALTA) 30 MG capsule    tiZANidine (ZANAFLEX) 2 MG tablet       Endocrine    BMI 28.0-28.9,adult (Chronic)    Overview     Body mass index is 28.01 kg/m².  Phentermine ineffective. Trial of tirzepatide via [facility] pharmacy. She tolerated this well previously.          Hormone replacement therapy (HRT) (Chronic)    Overview     Stable. Followed by Gynecology         Relevant Medications    medroxyPROGESTERone (PROVERA) 2.5 MG tablet       GI    Gastroesophageal reflux disease without esophagitis (Chronic)    Overview     Well controlled. Continue current regimen           Relevant Medications    pantoprazole (PROTONIX) 40 MG tablet     Fredis Boudreaux, MS4      I hereby acknowledge that I am relying upon documentation authored by a medical student working under my supervision and further I hereby attest that I have verified the student documentation or findings by personally re-performing the physical exam and medical decision making activities of the Evaluation and Management service to be billed.    Follow up in about 3 months (around 1/9/2025) for tirzepatide weight loss .      RTC/ED precautions discussed where applicable.   Encouraged patient to let me know if there are any further questions/concerns.     Advise patient/caretaker to check with insurance regarding orders to avoid unexpected fees/costs.     The patient/caretaker indicates understanding of these issues and agrees with the plan.    Dr. Allen Rao MD  Family Medicine

## 2024-10-09 NOTE — PATIENT INSTRUCTIONS
Located in: [facility]  Address: [address] [place] [address]  Hours: Opens soon ? 9?AM  Phone: [phone]

## 2024-10-11 ENCOUNTER — PATIENT MESSAGE (OUTPATIENT)
Dept: FAMILY MEDICINE | Facility: CLINIC | Age: 59
End: 2024-10-11
Payer: COMMERCIAL

## 2024-10-17 ENCOUNTER — E-VISIT (OUTPATIENT)
Dept: FAMILY MEDICINE | Facility: CLINIC | Age: 59
End: 2024-10-17
Payer: COMMERCIAL

## 2024-10-17 DIAGNOSIS — F32.A DEPRESSION, UNSPECIFIED DEPRESSION TYPE: Primary | ICD-10-CM

## 2024-10-17 PROCEDURE — 99421 OL DIG E/M SVC 5-10 MIN: CPT | Mod: ,,, | Performed by: FAMILY MEDICINE

## 2024-10-24 ENCOUNTER — PATIENT MESSAGE (OUTPATIENT)
Dept: FAMILY MEDICINE | Facility: CLINIC | Age: 59
End: 2024-10-24
Payer: COMMERCIAL

## 2024-10-24 PROBLEM — F32.A DEPRESSION: Status: ACTIVE | Noted: 2024-10-24

## 2024-10-24 RX ORDER — BUPROPION HYDROCHLORIDE 150 MG/1
150 TABLET ORAL DAILY
Qty: 90 TABLET | Refills: 0 | Status: SHIPPED | OUTPATIENT
Start: 2024-10-24 | End: 2025-10-24

## 2024-10-24 NOTE — PROGRESS NOTES
"Patient ID: Carly Carmen is a 59 y.o. female.    Chief Complaint: Mood Disorder (Entered automatically based on patient selection in [employer].)    The patient initiated a request through [employer] on 10/17/2024 for evaluation and management with a chief complaint of Mood Disorder (Entered automatically based on patient selection in [employer].)     I evaluated the questionnaire submission on 10/23/2024.    Ohs Peq Evisit Anxiety/Depression    10/21/2024  1:05 PM CDT - Filed by Patient   Do you agree to participate in an E-Visit? Yes   If you have any of the following symptoms, please present to your local emergency room or call 911:  I acknowledge   Medication requests for narcotics will not be addressed via an E-Visit.  Please schedule an appointment. I acknowledge   Choose the state of your primary residence [place]   What is the main issue you would like addressed today? No energy. Dont feel like foing anything.   Fear of embarrassment causes me to avoid doing things or speaking to people. Yes   I avoid activities in which I am the center of attention. Yes   Being embarrassed or looking stupid are among my worst fears. Yes   I would like to address: Medication for Anxiety or Depression   By selecting "I understand," you acknowledge that the answers that you provide for this questionnaire may not be immediately viewed by your provider or your care team. If you are personally dealing with suicidal thoughts or a crisis, please consider contacting your provider's office.  If your provider is not available, please consider taking action by: calling 911 or the National Suicide Prevention Lifeline any day, any time at 1-251.389.7892 or texting SIGNS to 320990 for 24/7 anonymous, free crisis counseling. I understand   Over the last 2 weeks, how often have you been bothered by the following problems?   Little interest or pleasure in doing things Several days   Feeling down, depressed, or hopeless Several days   PHQ-2 " Score (range: 0 - 6) 2 (Further screening not recommended)   Feeling nervous, anxious, on edge Several days   Not being able to stop or control worrying Several days   Worrying too much about different things Several days   Trouble relaxing Several days   Being so restless that its hard to sit still Several days   Becoming easily annoyed or irritable Several days   Feeling afraid as if something awful might happen Not at all   If you marked you are experiencing any of the aforementioned problems, how difficult have these made it for you to do your work, take care of things at home, or get along with other people? Not difficult at all   TOTAL SCORE: (range: 0 - 21) 6   Do you want to address a new or existing medication? I would like to start a new medication that I do not already take   What is the name of the medication that you would like to start? Wellbutrin   Have you taken a similar medication in the past? No   Why are you requesting this particular medication? It raises seratonin levels. From what I have read, I think this would help me.    What medical condition is the  medication intended to treat? Increase seratonin   Provide any additional information you feel is important.    Please attach any relevant images or files    Are you able to take your vital signs? Yes   Systolic Blood Pressure: 130   Diastolic Blood Pressure: 80   Weight: 180   Height: 68   Pulse:    Temperature: 97   Respiration rate:    Pulse Oxygen: 100         Encounter Diagnosis   Name Primary?    Depression, unspecified depression type Yes      Patient requesting wellbutrin. No hx of seizure d/o per chart. Start medication. Recommend 4-6 f/u via e visit or in person visit. To notify me via portal of changes.      Medications Ordered This Encounter   Medications    buPROPion (WELLBUTRIN XL) 150 MG TB24 tablet     Sig: Take 1 tablet (150 mg total) by mouth once daily.     Dispense:  90 tablet     Refill:  0        No follow-ups on  file.      E-Visit Time Tracking:    Day 1 Time (in minutes): 5    Total Time (in minutes): 5

## 2024-10-26 DIAGNOSIS — M06.00 SERONEGATIVE RHEUMATOID ARTHRITIS: ICD-10-CM

## 2024-10-28 RX ORDER — HYDROXYCHLOROQUINE SULFATE 200 MG/1
200 TABLET, FILM COATED ORAL 2 TIMES DAILY
Qty: 180 TABLET | Refills: 1 | Status: SHIPPED | OUTPATIENT
Start: 2024-10-28

## 2024-11-04 ENCOUNTER — TELEPHONE (OUTPATIENT)
Dept: FAMILY MEDICINE | Facility: CLINIC | Age: 59
End: 2024-11-04

## 2024-11-04 ENCOUNTER — ON-DEMAND VIRTUAL (OUTPATIENT)
Dept: URGENT CARE | Facility: CLINIC | Age: 59
End: 2024-11-04
Payer: COMMERCIAL

## 2024-11-04 ENCOUNTER — E-VISIT (OUTPATIENT)
Dept: FAMILY MEDICINE | Facility: CLINIC | Age: 59
End: 2024-11-04
Payer: COMMERCIAL

## 2024-11-04 DIAGNOSIS — K52.9 GASTROENTERITIS: Primary | ICD-10-CM

## 2024-11-04 DIAGNOSIS — R19.7 DIARRHEA, UNSPECIFIED TYPE: Primary | ICD-10-CM

## 2024-11-04 PROCEDURE — 99212 OFFICE O/P EST SF 10 MIN: CPT | Mod: 95,,, | Performed by: NURSE PRACTITIONER

## 2024-11-04 RX ORDER — LOPERAMIDE HYDROCHLORIDE 2 MG/1
2 CAPSULE ORAL 4 TIMES DAILY PRN
Qty: 12 CAPSULE | Refills: 0 | Status: SHIPPED | OUTPATIENT
Start: 2024-11-04 | End: 2024-11-08

## 2024-11-04 NOTE — TELEPHONE ENCOUNTER
----- Message from [place] sent at 11/4/2024 10:10 AM CST -----  Type:  Patient Returning Call    Who Called: Pt  Who Left Message for Patient:  Does the patient know what this is regarding?: symptoms  Would the patient rather a call back or a response via [facility]? Call  Best Call Back Number: [phone]  Additional Information: Pt states she need a prescription for diarrhea.  Pt stated she met with a virtual provider and was informed that the prescription she need the virtual provider could not prescribe Loperamide because it is a controlled substance

## 2024-11-04 NOTE — PROGRESS NOTES
Patient ID: Carly Carmen is a 59 y.o. female.    Chief Complaint: General Illness (Entered automatically based on patient selection in [employer].)    The patient initiated a request through [employer] on 11/4/2024 for evaluation and management with a chief complaint of General Illness (Entered automatically based on patient selection in [employer].)     I evaluated the questionnaire submission on 11/4/2024.    Ohs Peq Evisit Supergroup-Common Problems    11/4/2024 11:47 AM CST - Filed by Patient   What do you need help with? Nausea/Vomiting/Diarrhea   Do you agree to participate in an E-Visit? Yes   If you have any of the following symptoms, please present to your local emergency room or call 911:  I acknowledge   What is the main issue you would like addressed today? Diarrhea   Do you have diarrhea? Yes   How many stools have you passed in the last 24 hours? I can't remember   Is there blood in your stool, or is your stool dark red or black? None of the above   Does your stool contain pus or mucus? No   Have you taken a laxative or a medicine to help you move your bowels lately? No   Are you vomiting? No, I am not vomiting   Do you have belly pain? I have a little pain or no pain   Are you feeling dizzy or like you might pass out? Yes   When did your symptoms begin? 11/2/2024   Do you have a fever? No, I do not have a fever   Are you having trouble walking or lifting yourself due to weakness from this illness?  No   Do any of the following apply to you? My urine is normal   Did your condition begin after a specific meal that may have caused the illness? Not clearly related to a meal.    Have you taken antibiotics in the last two weeks? No   Have you been hospitalized in the past 2 months? No, I have not been hospitalized recently.   Do you work in a  center or healthcare environment? No   Does anyone you know have similar symptoms? No   Have you had a meal consisting of raw meat or fish in the week prior  to your illness? No   Have you recently travelled to a place where you may have caught an illness? No   Have you tried any medication or other treatment for your symptoms? Yes   Please list the treatments you tried, and the result of those treatments. Immodium   Provide any additional information you feel is important. Ns   Please attach any relevant images or files    Are you able to take your vital signs? No         Encounter Diagnosis   Name Primary?    Diarrhea, unspecified type Yes        No orders of the defined types were placed in this encounter.     Medications Ordered This Encounter   Medications    loperamide (IMODIUM) 2 mg capsule     Sig: Take 1 capsule (2 mg total) by mouth 4 (four) times daily as needed for Diarrhea.     Dispense:  12 capsule     Refill:  0        Follow up in about 3 days (around 11/7/2024).      E-Visit Time Tracking:    Day 1 Time (in minutes): 21    Total Time (in minutes): 21

## 2024-11-04 NOTE — PATIENT INSTRUCTIONS
Recommendations:   . Try to stay hydrated (Water, Diluted fruit juices, Gatorade, Pedialyte, Popsicles) as best as you can.      . Stick to a bland diet. Avoid spicy, greasy, fatty foods or dairy products until symptoms improve then advance diet as tolerated.     . Pepto-Bismol, Imodium, or Metamucil over the counter as directed for diarrhea relief.     . Over the counter Probiotics, as directed, may be beneficial.

## 2024-11-04 NOTE — PROGRESS NOTES
Subjective:      Patient ID: Carly Carmen is a 59 y.o. female.    Vitals:  vitals were not taken for this visit.     Chief Complaint: Diarrhea      Visit Type: TELE AUDIOVISUAL    Present with the patient at the time of consultation: TELEMED PRESENT WITH PATIENT: None, at home    Past Medical History:   Diagnosis Date    Abnormal Pap smear of cervix years ago    colpo done    Allergy     Chronic hip pain     Hidradenitis suppurativa 4/8/2016     Past Surgical History:   Procedure Laterality Date    BREAST BIOPSY Left 2015    BENIGN    COLONOSCOPY N/A 1/8/2016    Procedure: COLONOSCOPY;  Surgeon: Luis Santana Jr., MD;  Location: [facility];  Service: Endoscopy;  Laterality: N/A;    CYST REMOVAL      subcutaneous cyst to right chest wall    DILATION AND CURETTAGE OF UTERUS       Review of patient's allergies indicates:  No Known Allergies  Current Outpatient Medications on File Prior to Visit   Medication Sig Dispense Refill    adalimumab 40 mg/0.4 mL PnKt Inject 0.4 mLs (40 mg total) into the skin every 7 days. 4 Pen 6    buPROPion (WELLBUTRIN XL) 150 MG TB24 tablet Take 1 tablet (150 mg total) by mouth once daily. 90 tablet 0    DULoxetine (CYMBALTA) 30 MG capsule Take 1 capsule (30 mg total) by mouth once daily. 90 capsule 3    estrogens,esterified,-methyltestosterone 0.625-1.25mg (ESTRATEST HS) per tablet Take 1 tablet by mouth once daily. 90 tablet 3    hydroxychloroquine (PLAQUENIL) 200 mg tablet Take 1 tablet (200 mg total) by mouth 2 (two) times daily. 180 tablet 1    indomethacin (INDOCIN) 50 MG capsule Take 1 capsule (50 mg total) by mouth daily as needed (pain). 90 capsule 1    medroxyPROGESTERone (PROVERA) 2.5 MG tablet Take 1 tablet (2.5 mg total) by mouth once daily. 90 tablet 3    pantoprazole (PROTONIX) 40 MG tablet Take 1 tablet (40 mg total) by mouth once daily. 90 tablet 3    phentermine (ADIPEX-P) 37.5 mg tablet Take 1 tablet (37.5 mg total) by mouth before breakfast. 30 tablet 0     phentermine (ADIPEX-P) 37.5 mg tablet Take 1 tablet (37.5 mg total) by mouth before breakfast. 30 tablet 0    tiZANidine (ZANAFLEX) 2 MG tablet Take 1 tablet (2 mg total) by mouth 2 (two) times daily. 90 tablet 0    [DISCONTINUED] atorvastatin (LIPITOR) 20 MG tablet TAKE 1 TABLET DAILY 90 tablet 3     No current facility-administered medications on file prior to visit.     Family History   Problem Relation Name Age of Onset    Cancer Mother  69        Lung Cancer with Brain Mets -  72    Heart disease Father          defibrillator    No Known Problems Brother      No Known Problems Brother      Breast cancer Neg Hx      Colon cancer Neg Hx      Ovarian cancer Neg Hx             Ohs Peq Odvv Intake    2024  9:40 AM CST - Filed by Patient   What is your current physical address in the event of a medical emergency? [address]   Are you able to take your vital signs? No   Please attach any relevant images or files    Is your employer contracted with [facility]? No         3 days of diarrhea. Using imodium with no relief. No known sick contacts. Seeking further treatment options.    Diarrhea   Pertinent negatives include no abdominal pain or fever.       Constitution: Positive for fatigue and generalized weakness. Negative for fever.   Gastrointestinal:  Positive for nausea and diarrhea. Negative for abdominal pain, bright red blood in stool, dark colored stools, rectal bleeding and rectal pain.   Neurological:  Positive for light-headedness.        Objective:   The physical exam was conducted virtually.  Physical Exam   Constitutional: She is oriented to person, place, and time. She does not appear ill. No distress.   HENT:   Head: Normocephalic and atraumatic.   Nose: Nose normal.   Eyes: Extraocular movement intact   Pulmonary/Chest: Effort normal.   Abdominal: Normal appearance.   Musculoskeletal: Normal range of motion.         General: Normal range of motion.   Neurological: no focal  deficit. She is alert and oriented to person, place, and time.   Psychiatric: Her behavior is normal. Mood normal.   Vitals reviewed.      Assessment:     1. Gastroenteritis        Plan:     Follow-up as discussed.    Patient encouraged to monitor symptoms closely and instructed to follow-up for new or worsening symptoms. Further, in-person, evaluation may be necessary for continued treatment. Please follow up with your primary care doctor or specialist as needed. Verbally discussed plan. Patient confirms understanding and is in agreement with treatment and plan.     You must understand that you've received a Virtual Care evaluation only and that you may be released before all your medical problems are known or treated. You, the patient, will arrange for follow up care as instructed.    Gastroenteritis      Patient Instructions   Recommendations:   . Try to stay hydrated (Water, Diluted fruit juices, Gatorade, Pedialyte, Popsicles) as best as you can.      . Stick to a bland diet. Avoid spicy, greasy, fatty foods or dairy products until symptoms improve then advance diet as tolerated.     . Pepto-Bismol, Imodium, or Metamucil over the counter as directed for diarrhea relief.     . Over the counter Probiotics, as directed, may be beneficial.

## 2024-11-05 ENCOUNTER — PATIENT MESSAGE (OUTPATIENT)
Dept: FAMILY MEDICINE | Facility: CLINIC | Age: 59
End: 2024-11-05
Payer: COMMERCIAL

## 2024-11-08 ENCOUNTER — OFFICE VISIT (OUTPATIENT)
Dept: FAMILY MEDICINE | Facility: CLINIC | Age: 59
End: 2024-11-08
Payer: COMMERCIAL

## 2024-11-08 VITALS
HEART RATE: 81 BPM | DIASTOLIC BLOOD PRESSURE: 80 MMHG | SYSTOLIC BLOOD PRESSURE: 120 MMHG | BODY MASS INDEX: 27.46 KG/M2 | HEIGHT: 68 IN | WEIGHT: 181.19 LBS | OXYGEN SATURATION: 98 %

## 2024-11-08 DIAGNOSIS — Z72.0 TOBACCO ABUSE: ICD-10-CM

## 2024-11-08 DIAGNOSIS — M06.00 SERONEGATIVE RHEUMATOID ARTHRITIS: Chronic | ICD-10-CM

## 2024-11-08 DIAGNOSIS — R19.7 DIARRHEA, UNSPECIFIED TYPE: Primary | ICD-10-CM

## 2024-11-08 PROCEDURE — 3074F SYST BP LT 130 MM HG: CPT | Mod: CPTII,S$GLB,,

## 2024-11-08 PROCEDURE — 99214 OFFICE O/P EST MOD 30 MIN: CPT | Mod: S$GLB,,,

## 2024-11-08 PROCEDURE — 3008F BODY MASS INDEX DOCD: CPT | Mod: CPTII,S$GLB,,

## 2024-11-08 PROCEDURE — 99999 PR PBB SHADOW E&M-EST. PATIENT-LVL IV: CPT | Mod: PBBFAC,,,

## 2024-11-08 PROCEDURE — 1159F MED LIST DOCD IN RCRD: CPT | Mod: CPTII,S$GLB,,

## 2024-11-08 PROCEDURE — 1160F RVW MEDS BY RX/DR IN RCRD: CPT | Mod: CPTII,S$GLB,,

## 2024-11-08 PROCEDURE — 3079F DIAST BP 80-89 MM HG: CPT | Mod: CPTII,S$GLB,,

## 2024-11-08 NOTE — PROGRESS NOTES
Subjective:              Chief Complaint: Follow-up and Diarrhea     Carly Carmen is a 59 y.o. female, patient of Allen Rao MD with piriformis syndrome right side, anxiety, allergic rhinitis, hyperlipidemia, arthralgia of hip, sciatica right side, osteopenia of lumbar spine, IT band syndrome right side, polyarthralgia, known to me.    History of Present Illness    CHIEF COMPLAINT:  Carly presents alone today for follow-up of diarrhea.    GASTROINTESTINAL ISSUES:  She reports a recent 3-day episode of severe, uncontrollable diarrhea, unprecedented in intensity, requiring frequent clothing changes. Over-the-counter Imodium was ineffective, but prescribed medication resolved the issue by Wednesday. Associated symptoms included nausea and weakness. She denies any previous occurrences of diarrhea of this severity or any current nausea or vomiting. Since Wednesday, she has not had a bowel movement. She has a history of constipation and uses MiraLax as needed, which she finds effective. She took MiraLax in her coffee this morning but has not had a bowel movement yet. She denies any current abdominal pain.    FAMILY ILLNESS:  Her  recently experienced two days of violent vomiting with fever, which she describes as unprecedented in severity. He has since recovered.    MEDICAL HISTORY:  She has a history of rheumatoid arthritis.    MEDICATIONS:  Current medications include Humira for rheumatoid arthritis and Wellbutrin, which was added during her last visit in October. She denies any other new medications since then.    LIFESTYLE:  She continues to vape daily with no plans to quit. Her primary form of exercise is yard work. She admits to inadequate water intake, expressing difficulty consuming the recommended four 16-ounce bottles of water daily due to concerns about frequent urination. During her recent diarrhea episode, she consumed ginger ale and diet soda instead of water.    VACCINATION:  She declined  "the flu vaccine due to a past experience of severe influenza following vaccination many years ago, which has led to reluctance in receiving future flu vaccinations.      ROS:  General: -fever, -chills, -fatigue, -weight gain, -weight loss  Eyes: -vision changes, -redness, -discharge  ENT: -ear pain, -nasal congestion, -sore throat  Cardiovascular: -chest pain, -palpitations, -lower extremity edema  Respiratory: -cough, -shortness of breath  Gastrointestinal: -abdominal pain, +nausea, -vomiting, +diarrhea, -constipation, -blood in stool  Genitourinary: -dysuria, -hematuria, +frequency  Musculoskeletal: -joint pain, -muscle pain  Skin: -rash, -lesion  Neurological: -headache, -dizziness, -numbness, -tingling, +weakness  Psychiatric: -anxiety, -depression, -sleep difficulty              Objective:     Vitals:    11/08/24 1358   BP: 120/80   BP Location: Left arm   Patient Position: Sitting   Pulse: 81   SpO2: 98%   Weight: 82.2 kg (181 lb 3.5 oz)   Height: 5' 8" (1.727 m)          Physical Exam    General: No acute distress. Well-developed. Well-nourished.  Eyes: EOMI. Sclerae anicteric.  HENT: Normocephalic. Atraumatic.   Cardiovascular: Regular rate. Regular rhythm. No murmurs. No rubs. No gallops. Normal S1, S2.  Respiratory: Normal respiratory effort. Clear to auscultation bilaterally. No rales. No rhonchi. No wheezing.  Abdomen: Soft. Non-tender. Non-distended. Normoactive bowel sounds.  Musculoskeletal: No  obvious deformity.  Extremities: No lower extremity edema.  Neurological: Alert & oriented x3. No slurred speech. Normal gait.  Psychiatric: Normal mood. Normal affect. Good insight. Good judgment.  Skin: Warm. Dry. No rash.            Laboratory:  CBC:  Recent Labs   Lab Result Units 09/24/24  0930   WBC K/uL 4.66   RBC M/uL 4.51   Hemoglobin g/dL 14.3   Hematocrit % 40.6   Platelets K/uL 308   MCV fL 90   MCH pg 31.7*   MCHC g/dL 35.2     CMP:  Recent Labs   Lab Result Units 09/24/24  0930   Glucose mg/dL " "108   Calcium mg/dL 9.2   Albumin g/dL 3.6   Total Protein g/dL 7.2   Sodium mmol/L 142   Potassium mmol/L 3.7   CO2 mmol/L 26   Chloride mmol/L 109   BUN mg/dL 12   Alkaline Phosphatase U/L 74   ALT U/L 16   AST U/L 16   Total Bilirubin mg/dL 0.6     URINALYSIS:  No results for input(s): "COLORU", "CLARITYU", "SPECGRAV", "PHUR", "PROTEINUA", "GLUCOSEU", "BILIRUBINCON", "BLOODU", "WBCU", "RBCU", "BACTERIA", "MUCUS", "NITRITE", "LEUKOCYTESUR", "UROBILINOGEN", "HYALINECASTS" in the last 2160 hours.   LIPIDS:  No results for input(s): "TSH", "HDL", "CHOL", "TRIG", "LDLCALC", "CHOLHDL", "NONHDLCHOL", "TOTALCHOLEST" in the last 2160 hours.  TSH:  No results for input(s): "TSH" in the last 2160 hours.  A1C:  No results for input(s): "HGBA1C" in the last 2160 hours.    Assessment:         ICD-10-CM ICD-9-CM   1. Diarrhea, unspecified type  R19.7 787.91   2. Seronegative rheumatoid arthritis  M06.00 714.0   3. Tobacco abuse  Z72.0 305.1   4. BMI 28.0-28.9,adult  Z68.28 V85.24       Plan:       Diarrhea, unspecified type  Discussed possible constipation. See plan below.     Seronegative rheumatoid arthritis  See plan below.     Tobacco abuse  Encouraged smoking cessation.     BMI 28.0-28.9,adult  Work on diet modification and increase in physical activity as tolerated.       Assessment & Plan    VIRAL DIARRHEA:  Assessed patient's recent severe diarrhea episode, likely viral in nature.  Explained importance of adequate hydration, especially during diarrhea episodes and when working outdoors.  Carly to increase water intake to at least 4 16-ounce bottles per day.  Discontinued Imodium.  Contact the office if diarrhea recurs or if patient feels impacted.    CONSTIPATION:  Evaluated for potential constipation or impaction following diarrhea resolution.  Informed about the possibility of liquid stool bypassing impacted feces in cases of constipation.  Continued Miralax: Take daily until bowel movement occurs if feeling " constipated.    RHEUMATOID ARTHRITIS:  Reviewed current rheumatoid arthritis management by Dr. Joseph, including Humira dosing. Keep all follow ups with Dr. Joseph.     MEDICATIONS/SUPPLEMENTS:  Noted patient's use of Wellbutrin, started in October, unlikely cause of recent GI symptoms.  Discontinued Adderall.    OTHER INSTRUCTIONS:  Carly to continue yard work activities as tolerated.    FOLLOW UP:  Follow up if GI symptoms persist or worsen.  Continue to use patient portal for communication as needed.          If symptoms worsen, go to ER.  If symptoms do not improve, return to clinic.   Keep appointments with all specialists.     Patient verbalizes understanding and agrees with current treatment plan.      Follow up in about 5 months (around 4/8/2025).     Patient's Medications   New Prescriptions    No medications on file   Previous Medications    ADALIMUMAB 40 MG/0.4 ML PNKT    Inject 0.4 mLs (40 mg total) into the skin every 7 days.    BUPROPION (WELLBUTRIN XL) 150 MG TB24 TABLET    Take 1 tablet (150 mg total) by mouth once daily.    DULOXETINE (CYMBALTA) 30 MG CAPSULE    Take 1 capsule (30 mg total) by mouth once daily.    ESTROGENS,ESTERIFIED,-METHYLTESTOSTERONE 0.625-1.25MG (ESTRATEST HS) PER TABLET    Take 1 tablet by mouth once daily.    HYDROXYCHLOROQUINE (PLAQUENIL) 200 MG TABLET    Take 1 tablet (200 mg total) by mouth 2 (two) times daily.    INDOMETHACIN (INDOCIN) 50 MG CAPSULE    Take 1 capsule (50 mg total) by mouth daily as needed (pain).    MEDROXYPROGESTERONE (PROVERA) 2.5 MG TABLET    Take 1 tablet (2.5 mg total) by mouth once daily.    PANTOPRAZOLE (PROTONIX) 40 MG TABLET    Take 1 tablet (40 mg total) by mouth once daily.    TIZANIDINE (ZANAFLEX) 2 MG TABLET    Take 1 tablet (2 mg total) by mouth 2 (two) times daily.   Modified Medications    No medications on file   Discontinued Medications    LOPERAMIDE (IMODIUM) 2 MG CAPSULE    Take 1 capsule (2 mg total) by mouth 4 (four) times daily  as needed for Diarrhea.    PHENTERMINE (ADIPEX-P) 37.5 MG TABLET    Take 1 tablet (37.5 mg total) by mouth before breakfast.    PHENTERMINE (ADIPEX-P) 37.5 MG TABLET    Take 1 tablet (37.5 mg total) by mouth before breakfast.       This note was generated with the assistance of ambient listening technology. Verbal consent was obtained by the patient and accompanying visitor(s) for the recording of patient appointment to facilitate this note. I attest to having reviewed and edited the generated note for accuracy, though some syntax or spelling errors may persist. Please contact the author of this note for any clarification.         Brandy Sawyer NP

## 2024-11-25 DIAGNOSIS — M06.00 SERONEGATIVE RHEUMATOID ARTHRITIS: ICD-10-CM

## 2024-11-25 RX ORDER — ADALIMUMAB 40MG/0.4ML
40 KIT SUBCUTANEOUS
Qty: 4 PEN | Refills: 11 | Status: ACTIVE | OUTPATIENT
Start: 2024-11-25

## 2024-12-31 ENCOUNTER — PATIENT MESSAGE (OUTPATIENT)
Dept: RHEUMATOLOGY | Facility: CLINIC | Age: 59
End: 2024-12-31
Payer: COMMERCIAL

## 2024-12-31 DIAGNOSIS — M06.00 SERONEGATIVE RHEUMATOID ARTHRITIS: ICD-10-CM

## 2025-01-02 ENCOUNTER — TELEPHONE (OUTPATIENT)
Dept: RHEUMATOLOGY | Facility: CLINIC | Age: 60
End: 2025-01-02
Payer: COMMERCIAL

## 2025-01-02 RX ORDER — HYDROXYCHLOROQUINE SULFATE 200 MG/1
200 TABLET, FILM COATED ORAL 2 TIMES DAILY
Qty: 180 TABLET | Refills: 1 | Status: SHIPPED | OUTPATIENT
Start: 2025-01-02

## 2025-01-09 ENCOUNTER — PATIENT MESSAGE (OUTPATIENT)
Dept: ADMINISTRATIVE | Facility: OTHER | Age: 60
End: 2025-01-09
Payer: COMMERCIAL

## 2025-01-11 ENCOUNTER — PATIENT MESSAGE (OUTPATIENT)
Dept: FAMILY MEDICINE | Facility: CLINIC | Age: 60
End: 2025-01-11
Payer: COMMERCIAL

## 2025-01-20 DIAGNOSIS — F32.A DEPRESSION, UNSPECIFIED DEPRESSION TYPE: ICD-10-CM

## 2025-01-20 NOTE — TELEPHONE ENCOUNTER
No care due was identified.  [facility] Embedded Care Due Messages. Reference number: [id number].   1/20/2025 5:06:23 PM CST

## 2025-01-21 RX ORDER — BUPROPION HYDROCHLORIDE 150 MG/1
150 TABLET ORAL DAILY
Qty: 90 TABLET | Refills: 2 | Status: SHIPPED | OUTPATIENT
Start: 2025-01-21

## 2025-01-21 NOTE — TELEPHONE ENCOUNTER
Refill Decision Note   Carly Nella  is requesting a refill authorization.  Brief Assessment and Rationale for Refill:  Approve     Medication Therapy Plan:        Comments:     Note composed:4:31 PM 01/21/2025

## 2025-01-29 ENCOUNTER — OFFICE VISIT (OUTPATIENT)
Dept: RHEUMATOLOGY | Facility: CLINIC | Age: 60
End: 2025-01-29
Payer: COMMERCIAL

## 2025-01-29 DIAGNOSIS — Z13.820 ENCOUNTER FOR OSTEOPOROSIS SCREENING IN ASYMPTOMATIC POSTMENOPAUSAL PATIENT: ICD-10-CM

## 2025-01-29 DIAGNOSIS — M06.00 SERONEGATIVE RHEUMATOID ARTHRITIS: ICD-10-CM

## 2025-01-29 DIAGNOSIS — D84.821 IMMUNODEFICIENCY DUE TO LONG TERM DRUG THERAPY: Primary | ICD-10-CM

## 2025-01-29 DIAGNOSIS — Z79.899 IMMUNODEFICIENCY DUE TO LONG TERM DRUG THERAPY: Primary | ICD-10-CM

## 2025-01-29 DIAGNOSIS — Z78.0 ENCOUNTER FOR OSTEOPOROSIS SCREENING IN ASYMPTOMATIC POSTMENOPAUSAL PATIENT: ICD-10-CM

## 2025-01-29 DIAGNOSIS — R93.89 ABNORMAL CT OF THE CHEST: ICD-10-CM

## 2025-01-29 PROCEDURE — 98006 SYNCH AUDIO-VIDEO EST MOD 30: CPT | Mod: 95,,, | Performed by: STUDENT IN AN ORGANIZED HEALTH CARE EDUCATION/TRAINING PROGRAM

## 2025-01-29 RX ORDER — TIZANIDINE 2 MG/1
2 TABLET ORAL 2 TIMES DAILY
Qty: 90 TABLET | Refills: 0 | Status: SHIPPED | OUTPATIENT
Start: 2025-01-29

## 2025-01-29 RX ORDER — INDOMETHACIN 50 MG/1
50 CAPSULE ORAL DAILY PRN
Qty: 90 CAPSULE | Refills: 1 | Status: SHIPPED | OUTPATIENT
Start: 2025-01-29

## 2025-01-29 NOTE — PROGRESS NOTES
The patient location is: home  The chief complaint leading to consultation is: RA  Visit type: Virtual visit with synchronous audio and video  Total time spent with patient: 7 min    Each patient to whom he or she provides medical services by telemedicine is:  (1) informed of the relationship between the physician and patient and the respective role of any other health care provider with respect to management of the patient; and (2) notified that he or she may decline to receive medical services by telemedicine and may withdraw from such care at any time.      Subjective:      Patient ID: Carly Carmen is a 57 y.o. female.    Chief Complaint: Disease Management and Establish Care.     HPI     59 y.o.  F with no significant PMH here for follow up in RA    She is new to me. Used to follow with Dr. Velarde at [place]. Last visit in 5/2023.     She started to have joint pain beginning of September 2022. She has pain in shoulders, wrists, and hands.  She gets swelling in hands and wrists for the last several months. She is stiff in hands  and wrists all day.  She takes indomethacin 50 mg every 6 months.She is taking plaquenil 200mg po BID for last month.  Pain level is 8/10. She has trouble getting dressed, opening door knob, and driving.  Denies any rashes, oral ulcers, or alopecia.  She smoked 1 ppd for about 30 years. She quit smoking about 4 years ago and now is vaping.    MRI bilateral hands 2/2023:  1. Constellation of findings most suggestive of sequela of rheumatoid arthritis with moderate to severe disease activity and high risk of future aggressiveness.    She has hx of one episode of hydradenitis suppurative in groin that required excision in 2016.     Family hx: negative for RA or SLE     Interval history  -Last visit on 9/2024  -she is currently on Humira 40 mg SC q10 days, hydroxychloroquine 200 mg BID  -she has noted that she has to take indocin 50 mg QHS as if she misses it, she wakes up stiff.    -denies any side effects or injection reaction from Humira.    Answers submitted by the patient for this visit:  Rheumatology Questionnaire (Submitted on 1/22/2025)  fever: No  eye redness: No  mouth sores: No  headaches: No  shortness of breath: No  chest pain: No  trouble swallowing: No  diarrhea: No  constipation: No  unexpected weight change: No  genital sore: No  dysuria: No  During the last 3 days, have you had a skin rash?: No  Bruises or bleeds easily: No  cough: No      Past Medical History:   Diagnosis Date    Abnormal Pap smear of cervix years ago    colpo done    Allergy     Chronic hip pain     Hidradenitis suppurativa 4/8/2016     Review of Systems   Constitutional:  Negative for activity change, appetite change, chills, diaphoresis, fatigue and fever.   HENT:  Negative for congestion, ear discharge, ear pain, facial swelling, mouth sores, sinus pressure, sneezing, sore throat, tinnitus and trouble swallowing.    Eyes:  Negative for photophobia, pain, discharge, redness, itching and visual disturbance.   Respiratory:  Negative for apnea, cough, chest tightness, shortness of breath, wheezing and stridor.    Cardiovascular:  Negative for chest pain and leg swelling.   Gastrointestinal:  Negative for abdominal distention, abdominal pain, anal bleeding, blood in stool, constipation, diarrhea and nausea.   Endocrine: Negative for cold intolerance and heat intolerance.   Genitourinary:  Negative for difficulty urinating, dysuria and genital sores.   Musculoskeletal:  Positive for arthralgias, back pain, gait problem and joint swelling. Negative for myalgias, neck pain and neck stiffness.   Skin:  Negative for color change, pallor, rash and wound.   Neurological:  Negative for dizziness, seizures, light-headedness, numbness and headaches.   Hematological:  Negative for adenopathy. Does not bruise/bleed easily.   Psychiatric/Behavioral:  Negative for sleep disturbance. The patient is not nervous/anxious.           Objective:     There were no vitals filed for this visit.    Physical Exam   Constitutional: She is oriented to person, place, and time.   HENT:   Head: Normocephalic and atraumatic.   Nose: Nose normal.   Mouth/Throat: Oropharynx is clear and moist.  Neck: Supple  Pulmonary/Chest: Effort normal   Musculoskeletal:         General: No tenderness.      Cervical back: Neck supple.      Right 2nd MCP, right wrist with mild synovitis. Right shoulder with normal ROM     Left shoulder with positive signs of impingement.   Lymphadenopathy:     She has no cervical adenopathy.   Neurological: She is alert and oriented to person, place, and time. No cranial nerve deficit.   Skin: Skin is dry. No bruising and no rash noted. No erythema.   Psychiatric: Affect and judgment normal.        Assessment:   59 y.o.  F with no significant PMH here for follow up of seronegative RA.  Clinical exam consistent with RA. Humira started on 5/2023 due to high disease activity which has helped significantly but effect was wearing off at 10 days. She was also taking  mg daily (under dosed). Symptoms improved after changing Humira to every 10 days and increasing HCQ to 200 mg BID. We tapered HCQ to 200 mg daily after last visit as she was stable.   Has been having a flare for the past 2 weeks involving multiple joints. Declined steroids due to weight gain.     She is to follow up with pulmonary for abnormal CT chest.     DEXA scan 12/2021 normal   Repeat now     Drug induced immunodeficiency  No live vaccines. Vaccines per guidelines. immunosuppression/infectious precautions reinforced      Plan:   Continue Humira 40 mg SC every 7 days  Continue HCQ  200 mg BID   Continue indomethacin as needed   Labs every 6 months to monitor for humira and plaquenil.   Obtain DEXA scan now     In the future can consider other TNFs, IL-6 or Antonio if worsening symptosm     Follow up in about 4 months (around 5/29/2025).     Method of contact with  patient concerns: [facility]    Disclaimer:  This note is prepared using voice recognition software and as such is likely to have errors and has not been proof read. Please contact me for questions.     Argentina Phan M.D.  Rheumatology Department   [facility]

## 2025-02-01 ENCOUNTER — E-VISIT (OUTPATIENT)
Dept: FAMILY MEDICINE | Facility: CLINIC | Age: 60
End: 2025-02-01
Payer: COMMERCIAL

## 2025-02-01 DIAGNOSIS — R30.0 DYSURIA: Primary | ICD-10-CM

## 2025-02-03 ENCOUNTER — TELEPHONE (OUTPATIENT)
Dept: RHEUMATOLOGY | Facility: CLINIC | Age: 60
End: 2025-02-03
Payer: COMMERCIAL

## 2025-02-04 PROCEDURE — 99421 OL DIG E/M SVC 5-10 MIN: CPT | Mod: ,,, | Performed by: FAMILY MEDICINE

## 2025-02-04 NOTE — PROGRESS NOTES
Patient ID: Carly Carmen is a 59 y.o. female.    Chief Complaint: General Illness (Entered automatically based on patient selection in [employer].)    The patient initiated a request through [employer] on 2/1/2025 for evaluation and management with a chief complaint of General Illness (Entered automatically based on patient selection in [employer].)     I evaluated the questionnaire submission on 2/4/2025.    [facility]-Common Problems    2/1/2025  2:08 PM CST - Filed by Patient   What do you need help with? Urinary Symptoms   Do you agree to participate in an E-Visit? Yes   If you have any of the following symptoms, please present to your local emergency room or call 911:  I acknowledge   What is the main issue you would like addressed today? Possible UTI   What symptoms do you currently have? Pain while passing urine;  Difficulty passing urine;  Change in urine appearance or smell   When did your symptoms first appear? 1/27/2025   List what you have done or taken to help your symptoms. AZL   Please indicate whether you have had the following symptoms during the past 24 hours     Urgent urination (a sudden and uncontrollable urge to urinate) Severe   Frequent urination of small amounts of urine (going to the toilet very often) Severe   Burning pain when urinating Moderate   Incomplete bladder emptying (still feel like you need to urinate again after urination) Moderate   Pain not associated with urination in the lower abdomen below the belly button) Moderate   What does your urine look like? I am not sure   Blood seen in the urine None   Flank pain (pain in one or both sides of the lower back) Mild   Abnormal Vaginal Discharge (abnormal amount, color and/or odor) None   Discharge from the urethra (urinary opening) without urination None   High body temperature/fever? None-<99.5   Please rate how much discomfort you have experience because of the symptoms in the past 24 hours: Moderate   Please indicate  how the symptoms have interfered with your every day activities/work in the past 24 hours: Moderate   Please indicate how these symptoms have interfered with your social activities (visiting people, meeting with friends, etc.) in the past 24 hours? Moderate   Are you a diabetic? No   Please indicate whether you have the following at the time of completion of this questionnaire: None of the above   Provide any additional information you feel is important. For the past four or five weeks Every couple of days I feel like I have a UTI, its hard to pee and so I take AZO, usually just for a 1 day and everythings fine, and then four or five days later it starts all over again. Its happening today,   Please attach any relevant images or files (if you have performed a home test for UTI, please submit a photo of results)    Are you able to take your vital signs? No         Encounter Diagnosis   Name Primary?    Dysuria Yes   I have ordered urine tests I'd like you do today. This is in case you have bacteria which is resistant to antibiotics used commonly for UTIs. Your symptoms may be due to postmenopausal changes which can mimic a UTI.     Orders Placed This Encounter   Procedures    Urinalysis, Reflex to Urine Culture Urine, Clean Catch     Standing Status:   Future     Standing Expiration Date:   4/5/2026     Order Specific Question:   Preferred Collection Type     Answer:   Urine, Clean Catch     Order Specific Question:   Specimen Source     Answer:   Urine     Order Specific Question:   Send normal result to authorizing provider's In Basket if patient is active on MyChart:     Answer:   Yes      Follow up PRN     E-Visit Time Tracking:    Day 1 Time (in minutes): 5    Total Time (in minutes): 5

## 2025-02-05 ENCOUNTER — PATIENT MESSAGE (OUTPATIENT)
Dept: ADMINISTRATIVE | Facility: OTHER | Age: 60
End: 2025-02-05
Payer: COMMERCIAL

## 2025-02-20 ENCOUNTER — RESULTS FOLLOW-UP (OUTPATIENT)
Dept: RHEUMATOLOGY | Facility: CLINIC | Age: 60
End: 2025-02-20
Payer: COMMERCIAL

## 2025-02-25 ENCOUNTER — PATIENT MESSAGE (OUTPATIENT)
Dept: RHEUMATOLOGY | Facility: CLINIC | Age: 60
End: 2025-02-25
Payer: COMMERCIAL

## 2025-02-26 DIAGNOSIS — M06.00 SERONEGATIVE RHEUMATOID ARTHRITIS: ICD-10-CM

## 2025-02-26 RX ORDER — HYDROXYCHLOROQUINE SULFATE 200 MG/1
200 TABLET, FILM COATED ORAL 2 TIMES DAILY
Qty: 180 TABLET | Refills: 5 | Status: SHIPPED | OUTPATIENT
Start: 2025-02-26

## 2025-03-04 ENCOUNTER — PATIENT MESSAGE (OUTPATIENT)
Dept: ADMINISTRATIVE | Facility: OTHER | Age: 60
End: 2025-03-04
Payer: COMMERCIAL

## 2025-03-06 ENCOUNTER — PATIENT MESSAGE (OUTPATIENT)
Dept: FAMILY MEDICINE | Facility: CLINIC | Age: 60
End: 2025-03-06
Payer: COMMERCIAL

## 2025-03-07 ENCOUNTER — OFFICE VISIT (OUTPATIENT)
Dept: FAMILY MEDICINE | Facility: CLINIC | Age: 60
End: 2025-03-07
Payer: COMMERCIAL

## 2025-03-07 VITALS
SYSTOLIC BLOOD PRESSURE: 120 MMHG | DIASTOLIC BLOOD PRESSURE: 80 MMHG | WEIGHT: 180.56 LBS | HEART RATE: 82 BPM | HEIGHT: 68 IN | BODY MASS INDEX: 27.36 KG/M2 | OXYGEN SATURATION: 98 % | TEMPERATURE: 98 F

## 2025-03-07 DIAGNOSIS — S81.802A LEG WOUND, LEFT, INITIAL ENCOUNTER: Primary | ICD-10-CM

## 2025-03-07 DIAGNOSIS — J43.2 CENTRILOBULAR EMPHYSEMA: ICD-10-CM

## 2025-03-07 DIAGNOSIS — Z79.620 LONG TERM (CURRENT) USE OF IMMUNOSUPPRESSIVE BIOLOGIC: ICD-10-CM

## 2025-03-07 DIAGNOSIS — M06.00 SERONEGATIVE RHEUMATOID ARTHRITIS: Chronic | ICD-10-CM

## 2025-03-07 DIAGNOSIS — Z72.0 TOBACCO ABUSE: ICD-10-CM

## 2025-03-07 PROCEDURE — 99999 PR PBB SHADOW E&M-EST. PATIENT-LVL V: CPT | Mod: PBBFAC,,,

## 2025-03-07 RX ORDER — DOXYCYCLINE 100 MG/1
100 CAPSULE ORAL 2 TIMES DAILY
Qty: 10 CAPSULE | Refills: 0 | Status: SHIPPED | OUTPATIENT
Start: 2025-03-07 | End: 2025-03-12

## 2025-03-07 RX ORDER — MUPIROCIN 20 MG/G
OINTMENT TOPICAL 3 TIMES DAILY
Qty: 22 G | Refills: 0 | Status: SHIPPED | OUTPATIENT
Start: 2025-03-07

## 2025-03-07 RX ORDER — ATORVASTATIN CALCIUM 20 MG/1
20 TABLET, FILM COATED ORAL
COMMUNITY
Start: 2025-01-07

## 2025-03-07 NOTE — PATIENT INSTRUCTIONS
Make sure you keep your wound clean. You can use saline solution, apply bactroban ointment once per day if you need to. Appears to be healing given the scar formation.     Take doxycycline with food to prevent upset stomach.     Monitor for s/s of infection to increase in drainage, leg swelling, fever, chills.     Keep in mind, smoking decreases healing. Recommend smoking cessation.

## 2025-03-07 NOTE — PROGRESS NOTES
Subjective:              Chief Complaint: Fall  HPI   Carly Carmen is a 59 y.o. female, patient of Allen Rao MD with piriformis syndrome right side, anxiety, allergic rhinitis, hyperlipidemia, arthralgia of hip, sciatica right side, osteopenia of lumbar spine, IT band syndrome right side, polyarthralgia, known to me, presents today with complaints of Fall    Mrs. Carmen presents today with c/o left shin wound after falling over a crock outside about a week ago landing on both knees. Reports her knees are healing but the wound to the LL shin is not. Thinks she brush burned her left shin on a rug leaving an open scar that will not heal. Report some stinging pain, irritated by clothing. Denies drainage, leg swelling, although there is some redness and swelling around the wound. Was cleaning with peroxide but stopped as she was told this was not good to do.  Bathing with epsom salt, using Neosporin 3-4 x/day.     Currently on humira and is concerned about the healing.     Past Medical History:   Diagnosis Date    Abnormal Pap smear of cervix years ago    colpo done    Allergy     Chronic hip pain     Hidradenitis suppurativa 2016       Past Surgical History:   Procedure Laterality Date    BREAST BIOPSY Left     BENIGN    COLONOSCOPY N/A 2016    Procedure: COLONOSCOPY;  Surgeon: Luis Santana Jr., MD;  Location: [facility];  Service: Endoscopy;  Laterality: N/A;    CYST REMOVAL      subcutaneous cyst to right chest wall    DILATION AND CURETTAGE OF UTERUS         Family History   Problem Relation Name Age of Onset    Cancer Mother  69        Lung Cancer with Brain Mets -  72    Heart disease Father          defibrillator    No Known Problems Brother      No Known Problems Brother      Breast cancer Neg Hx      Colon cancer Neg Hx      Ovarian cancer Neg Hx         Social History     Socioeconomic History    Marital status:     Number of children: 1   Tobacco Use    Smoking status:  Former     Current packs/day: 0.50     Average packs/day: 0.5 packs/day for 30.0 years (15.0 ttl pk-yrs)     Types: Cigarettes, Vaping with nicotine     Passive exposure: Current    Smokeless tobacco: Never   Substance and Sexual Activity    Alcohol use: Yes     Alcohol/week: 8.0 standard drinks of alcohol     Types: 8 Glasses of wine per week     Comment: weekends    Drug use: Yes     Types: Amphetamines    Sexual activity: Yes     Partners: Male     Birth control/protection: Post-menopausal     Comment:    Social History Narrative    She is from [place].  She works for [employer] as an .   She is twice .  Her first   in .  She remarried in .  She has a 25 year-old daughter from her first marriage, who is getting  in 2014.  She lives in [place] with her  and her 18-year-old step son.         Social Drivers of Health     Financial Resource Strain: Low Risk  (3/6/2025)    Overall Financial Resource Strain (CARDIA)     Difficulty of Paying Living Expenses: Not hard at all   Food Insecurity: No Food Insecurity (3/6/2025)    Hunger Vital Sign     Worried About Running Out of Food in the Last Year: Never true     Ran Out of Food in the Last Year: Never true   Transportation Needs: No Transportation Needs (3/6/2025)    PRAPARE - Transportation     Lack of Transportation (Medical): No     Lack of Transportation (Non-Medical): No   Physical Activity: Inactive (3/6/2025)    Exercise Vital Sign     Days of Exercise per Week: 0 days     Minutes of Exercise per Session: 30 min   Stress: No Stress Concern Present (2024)    [employer] - Occupational Stress Questionnaire     Feeling of Stress : Not at all   Housing Stability: Low Risk  (3/6/2025)    Housing Stability Vital Sign     Unable to Pay for Housing in the Last Year: No     Number of Times Moved in the Last Year: 0     Homeless  "in the Last Year: No       Review of Systems   Respiratory:  Negative for cough and shortness of breath.    Cardiovascular:  Negative for chest pain and leg swelling.   Gastrointestinal:  Negative for constipation, diarrhea, nausea and vomiting.   Skin:  Positive for color change and wound. Negative for rash.         Objective:     Vitals:    03/07/25 0826 03/07/25 0850   BP: 120/80    BP Location: Left arm    Patient Position: Sitting    Pulse: 82    Temp:  97.7 °F (36.5 °C)   TempSrc:  Oral   SpO2: 98%    Weight: 81.9 kg (180 lb 8.9 oz)    Height: 5' 8" (1.727 m)           Physical Exam  Vitals reviewed.   Constitutional:       Appearance: Normal appearance. She is well-developed and well-groomed.   HENT:      Head: Normocephalic and atraumatic.   Eyes:      General: No scleral icterus.     Extraocular Movements: Extraocular movements intact.   Cardiovascular:      Rate and Rhythm: Normal rate and regular rhythm.      Heart sounds: Normal heart sounds.   Pulmonary:      Effort: Pulmonary effort is normal.      Breath sounds: Normal breath sounds.   Musculoskeletal:      Right lower leg: No edema.      Left lower leg: No edema.   Skin:     General: Skin is warm and dry.      Capillary Refill: Capillary refill takes less than 2 seconds.          Neurological:      General: No focal deficit present.      Mental Status: She is alert and oriented to person, place, and time.   Psychiatric:         Attention and Perception: Attention normal.         Mood and Affect: Mood normal.         Speech: Speech normal.         Behavior: Behavior is cooperative.                 Laboratory:  CBC:  Recent Labs   Lab Result Units 02/20/25  1051   WBC K/uL 4.50   RBC M/uL 4.77   Hemoglobin g/dL 15.1   Hematocrit % 43.1   Platelets K/uL 281   MCV fL 90   MCH pg 31.7*   MCHC g/dL 35.0     CMP:  Recent Labs   Lab Result Units 02/20/25  1051   Glucose mg/dL 89   Calcium mg/dL 9.5   Albumin g/dL 4.1   Total Protein g/dL 7.7   Sodium mmol/L " "142   Potassium mmol/L 3.7   CO2 mmol/L 25   Chloride mmol/L 107   BUN mg/dL 9   Alkaline Phosphatase U/L 82   ALT U/L 17   AST U/L 20   Total Bilirubin mg/dL 0.6     URINALYSIS:  Recent Labs   Lab Result Units 02/04/25  1404   Color, UA  Yellow   Specific Gravity, UA  1.025   pH, UA  6.0   Protein, UA  Negative   Nitrite, UA  Negative   Leukocytes, UA  Negative   Urobilinogen, UA EU/dL Negative      LIPIDS:  No results for input(s): "TSH", "HDL", "CHOL", "TRIG", "LDLCALC", "CHOLHDL", "NONHDLCHOL", "TOTALCHOLEST" in the last 2160 hours.  TSH:  No results for input(s): "TSH" in the last 2160 hours.  A1C:  No results for input(s): "HGBA1C" in the last 2160 hours.    Assessment:         ICD-10-CM ICD-9-CM   1. Leg wound, left, initial encounter  S81.802A 894.0   2. Centrilobular emphysema  J43.2 492.8   3. Long term (current) use of immunosuppressive biologic  Z79.620 V58.69   4. Tobacco abuse  Z72.0 305.1       Plan:       Leg wound, left, initial encounter  -     mupirocin (BACTROBAN) 2 % ointment; Apply topically 3 (three) times daily.  Dispense: 22 g; Refill: 0  -     doxycycline (MONODOX) 100 MG capsule; Take 1 capsule (100 mg total) by mouth 2 (two) times daily. for 5 days  Dispense: 10 capsule; Refill: 0  Appears to be healing with scab formation; however, there is still a lot of erythema and swelling periwound, tenderness. Patient on humira and concerned about healing. Will treat with doxycycline BID x 5 days.   Instructed patient to keep area clean. With scab formation, I don't believe she will need bactroban; however, will prescribe as needed.      Centrilobular emphysema  Controlled. Currently does not use any inhalers.   Continue to monitor.     Seronegative rheumatoid arthritis  Long term (current) use of immunosuppressive biologic  Stable. On Humira. Followed by Rheumatology.     Tobacco abuse  Currently still vaping. Explained to patient smoking delays wound healing. Encouraged cessation.       If " symptoms worsen, go to ER.  If symptoms do not improve, return to clinic.   Keep appointments with all specialists.     Patient verbalizes understanding and agrees with current treatment plan.      Follow up in about 1 month (around 4/10/2025).     Patient's Medications   New Prescriptions    DOXYCYCLINE (MONODOX) 100 MG CAPSULE    Take 1 capsule (100 mg total) by mouth 2 (two) times daily. for 5 days    MUPIROCIN (BACTROBAN) 2 % OINTMENT    Apply topically 3 (three) times daily.   Previous Medications    ADALIMUMAB (HUMIRA,CF, PEN) 40 MG/0.4 ML PNKT    Inject 0.4 mLs (40 mg total) into the skin every 7 days.    ATORVASTATIN (LIPITOR) 20 MG TABLET    Take 20 mg by mouth.    BUPROPION (WELLBUTRIN XL) 150 MG TB24 TABLET    Take 1 tablet (150 mg total) by mouth once daily.    DULOXETINE (CYMBALTA) 30 MG CAPSULE    Take 1 capsule (30 mg total) by mouth once daily.    ESTROGENS,ESTERIFIED,-METHYLTESTOSTERONE 0.625-1.25MG (ESTRATEST HS) PER TABLET    Take 1 tablet by mouth once daily.    HYDROXYCHLOROQUINE (PLAQUENIL) 200 MG TABLET    Take 1 tablet (200 mg total) by mouth 2 (two) times daily.    INDOMETHACIN (INDOCIN) 50 MG CAPSULE    Take 1 capsule (50 mg total) by mouth daily as needed (pain).    MEDROXYPROGESTERONE (PROVERA) 2.5 MG TABLET    Take 1 tablet (2.5 mg total) by mouth once daily.    PANTOPRAZOLE (PROTONIX) 40 MG TABLET    Take 1 tablet (40 mg total) by mouth once daily.    TIZANIDINE (ZANAFLEX) 2 MG TABLET    Take 1 tablet (2 mg total) by mouth 2 (two) times daily.   Modified Medications    No medications on file   Discontinued Medications    No medications on file         Brandy Sawyer NP

## 2025-03-31 ENCOUNTER — PATIENT MESSAGE (OUTPATIENT)
Dept: ADMINISTRATIVE | Facility: OTHER | Age: 60
End: 2025-03-31
Payer: COMMERCIAL

## 2025-04-10 ENCOUNTER — PATIENT MESSAGE (OUTPATIENT)
Dept: FAMILY MEDICINE | Facility: CLINIC | Age: 60
End: 2025-04-10

## 2025-04-10 ENCOUNTER — OFFICE VISIT (OUTPATIENT)
Dept: FAMILY MEDICINE | Facility: CLINIC | Age: 60
End: 2025-04-10
Payer: COMMERCIAL

## 2025-04-10 VITALS
DIASTOLIC BLOOD PRESSURE: 72 MMHG | BODY MASS INDEX: 26.8 KG/M2 | SYSTOLIC BLOOD PRESSURE: 104 MMHG | HEART RATE: 81 BPM | HEIGHT: 68 IN | WEIGHT: 176.81 LBS | OXYGEN SATURATION: 98 %

## 2025-04-10 DIAGNOSIS — M06.00 SERONEGATIVE RHEUMATOID ARTHRITIS: Chronic | ICD-10-CM

## 2025-04-10 DIAGNOSIS — F32.A DEPRESSION, UNSPECIFIED DEPRESSION TYPE: ICD-10-CM

## 2025-04-10 DIAGNOSIS — Z79.899 IMMUNODEFICIENCY DUE TO LONG TERM DRUG THERAPY: Chronic | ICD-10-CM

## 2025-04-10 DIAGNOSIS — Z00.00 ANNUAL PHYSICAL EXAM: Primary | ICD-10-CM

## 2025-04-10 DIAGNOSIS — I70.0 AORTIC ATHEROSCLEROSIS: Chronic | ICD-10-CM

## 2025-04-10 DIAGNOSIS — E78.2 MIXED HYPERLIPIDEMIA: Chronic | ICD-10-CM

## 2025-04-10 DIAGNOSIS — D84.821 IMMUNODEFICIENCY DUE TO LONG TERM DRUG THERAPY: Chronic | ICD-10-CM

## 2025-04-10 DIAGNOSIS — J43.2 CENTRILOBULAR EMPHYSEMA: Chronic | ICD-10-CM

## 2025-04-10 PROCEDURE — 3078F DIAST BP <80 MM HG: CPT | Mod: CPTII,S$GLB,, | Performed by: FAMILY MEDICINE

## 2025-04-10 PROCEDURE — 3074F SYST BP LT 130 MM HG: CPT | Mod: CPTII,S$GLB,, | Performed by: FAMILY MEDICINE

## 2025-04-10 PROCEDURE — 1159F MED LIST DOCD IN RCRD: CPT | Mod: CPTII,S$GLB,, | Performed by: FAMILY MEDICINE

## 2025-04-10 PROCEDURE — 99396 PREV VISIT EST AGE 40-64: CPT | Mod: 25,S$GLB,, | Performed by: FAMILY MEDICINE

## 2025-04-10 PROCEDURE — 3008F BODY MASS INDEX DOCD: CPT | Mod: CPTII,S$GLB,, | Performed by: FAMILY MEDICINE

## 2025-04-10 PROCEDURE — 99999 PR PBB SHADOW E&M-EST. PATIENT-LVL III: CPT | Mod: PBBFAC,,, | Performed by: FAMILY MEDICINE

## 2025-04-10 PROCEDURE — 1160F RVW MEDS BY RX/DR IN RCRD: CPT | Mod: CPTII,S$GLB,, | Performed by: FAMILY MEDICINE

## 2025-04-10 RX ORDER — BUPROPION HYDROCHLORIDE 150 MG/1
150 TABLET ORAL DAILY
Qty: 90 TABLET | Refills: 3 | Status: SHIPPED | OUTPATIENT
Start: 2025-04-10

## 2025-04-10 RX ORDER — BUPROPION HYDROCHLORIDE 300 MG/1
300 TABLET ORAL DAILY
Qty: 90 TABLET | Refills: 3 | Status: SHIPPED | OUTPATIENT
Start: 2025-04-10 | End: 2025-04-10

## 2025-04-10 NOTE — PROGRESS NOTES
"PATIENT VISIT FAMILY MEDICINE    CC:   Chief Complaint   Patient presents with    Annual Exam       HPI:    URINARY SYMPTOMS:  She experienced recurring UTI-like symptoms occurring every two weeks that would resolve with OTC Azo. Symptoms have now improved. Urine tests were normal.    MENTAL HEALTH:  She reports lack of motivation but denies symptoms of depression such as crying spells. She continues Wellbutrin and Cymbalta for mental health management.    WEIGHT MANAGEMENT:  She has lost 10 lbs since starting Tirzepatide 2.5 mg. She exercises by riding bike. Her dose will be increased by prescribing provider.    SLEEP:  She reports sleeping well.          MEDS: Current Medications[1]    OBJECTIVE:   Vitals:    04/10/25 0929   BP: 104/72   BP Location: Left arm   Patient Position: Sitting   Pulse: 81   SpO2: 98%   Weight: 80.2 kg (176 lb 12.9 oz)   Height: 5' 8" (1.727 m)     Body mass index is 26.88 kg/m².      Physical Exam    Constitutional: Normal appearance.  HENT: Normocephalic. All normal.  Eyes: No discharge bilaterally. Extraocular movements intact.  Cardiovascular: Normal rate and regular rhythm. Normal heart sounds.  Pulmonary: Pulmonary effort is normal. Normal breath sounds.  Abdominal: Abdomen is flat. Bowel sounds are normal. No distension. Abdomen is soft. No mass. No tenderness. No guarding. No rebound. No hernia is present.  Skin: Warm. Dry.  Neurological: Alert.  Psychiatric: Behavior normal.    LABS:   A1C:  Recent Labs   Lab 05/19/23  1650   Hemoglobin A1C 5.6     CBC:  Recent Labs   Lab 02/20/25  1051   WBC 4.50   RBC 4.77   Hemoglobin 15.1   Hematocrit 43.1   Platelets 281   MCV 90   MCH 31.7 H   MCHC 35.0     CMP:  Recent Labs   Lab 02/20/25  1051   Glucose 89   Calcium 9.5   Albumin 4.1   Total Protein 7.7   Sodium 142   Potassium 3.7   CO2 25   Chloride 107   BUN 9   Creatinine 0.8   Alkaline Phosphatase 82   ALT 17   AST 20   Total Bilirubin 0.6     LIPIDS:  Recent Labs   Lab " 08/23/23  1035   TSH 0.622   HDL 40   Cholesterol 127   Triglycerides 85   LDL Cholesterol 70.0   HDL/Cholesterol Ratio 31.5   Non-HDL Cholesterol 87   Total Cholesterol/HDL Ratio 3.2     TSH:  Recent Labs   Lab 08/23/23  1035   TSH 0.622         ASSESSMENT & PLAN:    Problem List Items Addressed This Visit          Psychiatric    Depression    Relevant Medications    buPROPion (WELLBUTRIN XL) 150 MG TB24 tablet       Pulmonary    Centrilobular emphysema (Chronic)    Overview   CT chest with upper lung zone predominant centrilobular emphysema and biapical scarring.  30 pack year history.  Quit 4 years ago. Followed by Pulm            Cardiac/Vascular    Aortic atherosclerosis (Chronic)    Overview   Seen on 03/13/2023 CT chest.  Known hyperlipidemia.  On atorvastatin.         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  JUSTINO, RF, CCP negative but with polyarthralgias and reportedly clinically consistent with RA.  Continue adalimumab. Patient at greater risk for opportunistic infections         Seronegative rheumatoid arthritis (Chronic)    Overview   Stable. Continue current regimen. Followed by Rheumatology         Relevant Orders    Hemoglobin A1C     Other Visit Diagnoses         Annual physical exam    -  Preventative cares discussed and updated as needed. Lifestyle modifications discussed as needed.              Depression   With anhedonia. Increased Wellbutrin from 150 mg to 300 mg daily, to be taken in the morning, with expected timeline of 4-6 weeks for noticing effects.    Follow up in 6 months for medications     RTC/ED precautions discussed where applicable.   Encouraged patient to let me know if there are any further questions/concerns.     Advise patient/caretaker to check with  insurance regarding orders to avoid unexpected fees/costs.     The patient/caretaker indicates understanding of these issues and agrees with the plan.    This note was generated with the assistance of ambient listening technology. I attest to having reviewed and edited the generated note for accuracy, though some syntax or spelling errors may persist. Please contact the author of this note for any clarification.      Dr. Allen Rao MD  Family Medicine       [1]   Current Outpatient Medications:     adalimumab (HUMIRA,CF, PEN) 40 mg/0.4 mL PnKt, Inject 0.4 mLs (40 mg total) into the skin every 7 days., Disp: 4 pen , Rfl: 11    atorvastatin (LIPITOR) 20 MG tablet, Take 20 mg by mouth., Disp: , Rfl:     DULoxetine (CYMBALTA) 30 MG capsule, Take 1 capsule (30 mg total) by mouth once daily., Disp: 90 capsule, Rfl: 3    estrogens,esterified,-methyltestosterone 0.625-1.25mg (ESTRATEST HS) per tablet, Take 1 tablet by mouth once daily., Disp: 90 tablet, Rfl: 3    hydroxychloroquine (PLAQUENIL) 200 mg tablet, Take 1 tablet (200 mg total) by mouth 2 (two) times daily., Disp: 180 tablet, Rfl: 5    indomethacin (INDOCIN) 50 MG capsule, Take 1 capsule (50 mg total) by mouth daily as needed (pain)., Disp: 90 capsule, Rfl: 1    medroxyPROGESTERone (PROVERA) 2.5 MG tablet, Take 1 tablet (2.5 mg total) by mouth once daily., Disp: 90 tablet, Rfl: 3    pantoprazole (PROTONIX) 40 MG tablet, Take 1 tablet (40 mg total) by mouth once daily., Disp: 90 tablet, Rfl: 3    tirzepatide 2.5 mg/0.5 mL PnIj, Inject 2.5 mg into the skin every 7 days., Disp: , Rfl:     tiZANidine (ZANAFLEX) 2 MG tablet, Take 1 tablet (2 mg total) by mouth 2 (two) times daily., Disp: 90 tablet, Rfl: 0    buPROPion (WELLBUTRIN XL) 150 MG TB24 tablet, Take 1 tablet (150 mg total) by mouth once daily., Disp: 90 tablet, Rfl: 3

## 2025-04-15 DIAGNOSIS — Z79.890 HORMONE REPLACEMENT THERAPY (HRT): ICD-10-CM

## 2025-04-15 NOTE — TELEPHONE ENCOUNTER
Refill Routing Note   Medication(s) are not appropriate for processing by [facility] for the following reason(s):        Outside of protocol    [facility] action(s):  Route             Appointments  past 12m or future 3m with PCP    Date Provider   Last Visit   7/12/2023 Ruthie Block MD   Next Visit   Visit date not found Ruthie Block MD   ED visits in past 90 days: 0        Note composed:6:00 PM 04/15/2025

## 2025-04-16 ENCOUNTER — RESULTS FOLLOW-UP (OUTPATIENT)
Dept: FAMILY MEDICINE | Facility: CLINIC | Age: 60
End: 2025-04-16

## 2025-04-16 ENCOUNTER — TELEPHONE (OUTPATIENT)
Dept: OBSTETRICS AND GYNECOLOGY | Facility: CLINIC | Age: 60
End: 2025-04-16
Payer: COMMERCIAL

## 2025-04-16 RX ORDER — ESTERIFIED ESTROGENS AND METHYLTESTOSTERONE .625; 1.25 MG/1; MG/1
1 TABLET, FILM COATED ORAL DAILY
Qty: 90 TABLET | Refills: 3 | Status: SHIPPED | OUTPATIENT
Start: 2025-04-16

## 2025-04-16 NOTE — TELEPHONE ENCOUNTER
Attempted to contact pt regarding scheduling an annual exam appointment. I left a voicemail for the pt to return the phone call.

## 2025-04-28 ENCOUNTER — PATIENT MESSAGE (OUTPATIENT)
Dept: ADMINISTRATIVE | Facility: OTHER | Age: 60
End: 2025-04-28
Payer: COMMERCIAL

## 2025-05-11 ENCOUNTER — PATIENT MESSAGE (OUTPATIENT)
Dept: FAMILY MEDICINE | Facility: CLINIC | Age: 60
End: 2025-05-11
Payer: COMMERCIAL

## 2025-05-13 ENCOUNTER — OFFICE VISIT (OUTPATIENT)
Dept: FAMILY MEDICINE | Facility: CLINIC | Age: 60
End: 2025-05-13
Payer: COMMERCIAL

## 2025-05-13 ENCOUNTER — RESULTS FOLLOW-UP (OUTPATIENT)
Dept: FAMILY MEDICINE | Facility: CLINIC | Age: 60
End: 2025-05-13

## 2025-05-13 VITALS
DIASTOLIC BLOOD PRESSURE: 80 MMHG | OXYGEN SATURATION: 96 % | BODY MASS INDEX: 25.68 KG/M2 | HEART RATE: 90 BPM | HEIGHT: 68 IN | WEIGHT: 169.44 LBS | TEMPERATURE: 98 F | SYSTOLIC BLOOD PRESSURE: 120 MMHG

## 2025-05-13 DIAGNOSIS — R39.11 URINARY HESITANCY: ICD-10-CM

## 2025-05-13 DIAGNOSIS — R30.0 DYSURIA: Primary | ICD-10-CM

## 2025-05-13 DIAGNOSIS — R31.9 URINARY TRACT INFECTION WITH HEMATURIA, SITE UNSPECIFIED: Primary | ICD-10-CM

## 2025-05-13 DIAGNOSIS — N39.0 URINARY TRACT INFECTION WITH HEMATURIA, SITE UNSPECIFIED: Primary | ICD-10-CM

## 2025-05-13 PROCEDURE — 99999 PR PBB SHADOW E&M-EST. PATIENT-LVL IV: CPT | Mod: PBBFAC,,,

## 2025-05-13 PROCEDURE — 3008F BODY MASS INDEX DOCD: CPT | Mod: CPTII,S$GLB,,

## 2025-05-13 PROCEDURE — 3074F SYST BP LT 130 MM HG: CPT | Mod: CPTII,S$GLB,,

## 2025-05-13 PROCEDURE — 3044F HG A1C LEVEL LT 7.0%: CPT | Mod: CPTII,S$GLB,,

## 2025-05-13 PROCEDURE — 3079F DIAST BP 80-89 MM HG: CPT | Mod: CPTII,S$GLB,,

## 2025-05-13 PROCEDURE — 99214 OFFICE O/P EST MOD 30 MIN: CPT | Mod: S$GLB,,,

## 2025-05-13 PROCEDURE — 1160F RVW MEDS BY RX/DR IN RCRD: CPT | Mod: CPTII,S$GLB,,

## 2025-05-13 PROCEDURE — 1159F MED LIST DOCD IN RCRD: CPT | Mod: CPTII,S$GLB,,

## 2025-05-13 RX ORDER — PHENAZOPYRIDINE HYDROCHLORIDE 100 MG/1
100 TABLET, FILM COATED ORAL 3 TIMES DAILY PRN
Qty: 30 TABLET | Refills: 0 | Status: SHIPPED | OUTPATIENT
Start: 2025-05-13 | End: 2025-05-23

## 2025-05-13 RX ORDER — NITROFURANTOIN 25; 75 MG/1; MG/1
100 CAPSULE ORAL 2 TIMES DAILY
Qty: 10 CAPSULE | Refills: 0 | Status: SHIPPED | OUTPATIENT
Start: 2025-05-13 | End: 2025-05-18

## 2025-05-13 NOTE — PROGRESS NOTES
Subjective:              Chief Complaint: Urinary Tract Infection     Carly Carmen is a 59 y.o. female, patient of Allen Rao MD with piriformis syndrome right side, anxiety, allergic rhinitis, hyperlipidemia, arthralgia of hip, sciatica right side, osteopenia of lumbar spine, IT band syndrome right side, polyarthralgia, known to me, presents today with complaints of Urinary Tract Infection    History of Present Illness    CHIEF COMPLAINT:  Carly presents today for recurrent urinary tract infections.     URINARY SYMPTOMS:  She reports pain and hesitancy with urination but denies burning sensation, urgency, and frequency. She takes Azo cranberry (2 tablets twice daily) for symptom management with relief after approximately one week. She experiences stress incontinence with coughing and sneezing.    GYNECOLOGICAL HISTORY:  She is sexually active and underwent menopause 20 years ago. She denies vaginal discharge and vaginal dryness.    WEIGHT MANAGEMENT MEDICATIONS:  She is currently on tirzepatide (2nd strength, 2nd or 3rd shot at this dose), which she started a few months ago. The first dose (2.5 mg) was not very effective, but she reports the second dose is showing improved efficacy. She previously tried semaglutide but experienced significant side effects that made her feel unwell. She is currently tolerating tirzepatide without issues. She reports weight gain associated with previous prednisone use during treatment for rheumatoid arthritis, with difficulty losing the gained weight and experiencing discomfort and elevated blood pressure during that period.      ROS:  General: -fever, -chills, -fatigue, -weight gain, -weight loss  Eyes: -vision changes, -redness, -discharge  ENT: -ear pain, -nasal congestion, -sore throat  Cardiovascular: -chest pain, -palpitations, -lower extremity edema  Respiratory: -cough, -shortness of breath  Gastrointestinal: -abdominal pain, -nausea, -vomiting, -diarrhea,  "-constipation, -blood in stool  Genitourinary: +dysuria, -hematuria, -frequency, +painful urination, +hesitancy, +urinary incontinence  Musculoskeletal: -joint pain, -muscle pain  Skin: -rash, -lesion  Neurological: -headache, -dizziness, -numbness, -tingling  Psychiatric: -anxiety, -depression, -sleep difficulty              Objective:     Vitals:    05/13/25 1034 05/13/25 1044   BP: 120/80    BP Location: Left arm    Patient Position: Sitting    Pulse: 90    Temp:  98 °F (36.7 °C)   TempSrc:  Oral   SpO2: 96%    Weight: 76.9 kg (169 lb 6.8 oz)    Height: 5' 8" (1.727 m)           Physical Exam    General: No acute distress. Well-developed. Well-nourished.  Eyes: EOMI. Sclerae anicteric.  HENT: Normocephalic. Atraumatic.   Cardiovascular: Regular rate. Regular rhythm. No murmurs.  Respiratory: Normal respiratory effort. Clear to auscultation bilaterally. No rales. No rhonchi. No wheezing.  Abdomen: Soft. Non-tender. Non-distended. Normoactive bowel sounds. No CVA tenderness.   Musculoskeletal: No  obvious deformity.  Extremities: No lower extremity edema.  Neurological: Alert & oriented x3. No slurred speech. Normal gait.  Psychiatric: Normal mood. Normal affect. Good insight. Good judgment.  Skin: Warm. Dry. No rash.            Laboratory:  CBC:  Recent Labs   Lab Result Units 02/20/25  1051   WBC K/uL 4.50   RBC M/uL 4.77   Hemoglobin g/dL 15.1   Hematocrit % 43.1   Platelets K/uL 281   MCV fL 90   MCH pg 31.7*   MCHC g/dL 35.0     CMP:  Recent Labs   Lab Result Units 02/20/25  1051   Glucose mg/dL 89   Calcium mg/dL 9.5   Albumin g/dL 4.1   Total Protein g/dL 7.7   Sodium mmol/L 142   Potassium mmol/L 3.7   CO2 mmol/L 25   Chloride mmol/L 107   BUN mg/dL 9   Alkaline Phosphatase U/L 82   ALT U/L 17   AST U/L 20   Total Bilirubin mg/dL 0.6     URINALYSIS:  No results for input(s): "COLORU", "CLARITYU", "SPECGRAV", "PHUR", "PROTEINUA", "GLUCOSEU", "BILIRUBINCON", "BLOODU", "WBCU", "RBCU", "BACTERIA", "MUCUS", " ""NITRITE", "LEUKOCYTESUR", "UROBILINOGEN", "HYALINECASTS" in the last 2160 hours.   LIPIDS:  Recent Labs   Lab Result Units 04/10/25  1010   HDL Cholesterol mg/dL 53   Cholesterol Total mg/dL 124   Triglyceride mg/dL 70   LDL Cholesterol mg/dL 57.0*   HDL/Cholesterol Ratio % 42.7   Non HDL Cholesterol mg/dL 71   Cholesterol/HDL Ratio  2.3     TSH:  No results for input(s): "TSH" in the last 2160 hours.  A1C:  Recent Labs   Lab Result Units 04/10/25  1010   Hemoglobin A1c % 5.2       Assessment:         ICD-10-CM ICD-9-CM   1. Dysuria  R30.0 788.1   2. Urinary hesitancy  R39.11 788.64   3. BMI 28.0-28.9,adult  Z68.28 V85.24       Plan:       Dysuria  -     Urinalysis; Future; Expected date: 05/13/2025  -     Urine Culture High Risk; Future; Expected date: 05/13/2025  -     phenazopyridine (PYRIDIUM) 100 MG tablet; Take 1 tablet (100 mg total) by mouth 3 (three) times daily as needed for Pain.  Dispense: 30 tablet; Refill: 0  - Monitored the patient's recurring UTIs which occur every couple of months, with symptoms including pain during urination and hesitancy.  - Ordered urinalysis to evaluate for infection.  - Prescribed Pyridium (phenazopyridine) 100 mg, to be taken up to 3 times daily as needed for pain.  - Advised the patient to drink at least 4 bottles of water a day and explained that Pyridium will turn urine orange.   - If urinalysis is negative, may need GYN or urological evaluation and consider referral to urologist for cystoscopy.  -will message via the portal with the results of urine testing, will treat if needed.     Urinary hesitancy  -     Urinalysis; Future; Expected date: 05/13/2025  -     Urine Culture High Risk; Future; Expected date: 05/13/2025    BMI 28.0-28.9,adult   - Evaluated current weight loss medication regimen.  - patient currently on tirzepatide from a compound pharmacy she thinks its 5 mg, which is finally working.  - Assessed for contraindications to tirzepatide (family history of " thyroid cancer, pancreatitis), No CI.  - new prescription sent to [facility] pharmacy for 4 mg weekly.   - Planned to meet every 3 months to monitor the effectiveness of tirzepatide.  -continue to work on diet modification and exercise a few times per week as tolerated.   - Instructed the patient to message through portal if wanting to increase tirzepatide dose before next appointment.              If symptoms worsen, go to ER.  If symptoms do not improve, return to clinic.   Keep appointments with all specialists.     Patient verbalizes understanding and agrees with current treatment plan.      Follow up in about 2 months (around 7/28/2025) for weight management.     Patient's Medications   New Prescriptions    PHENAZOPYRIDINE (PYRIDIUM) 100 MG TABLET    Take 1 tablet (100 mg total) by mouth 3 (three) times daily as needed for Pain.   Previous Medications    ADALIMUMAB (HUMIRA,CF, PEN) 40 MG/0.4 ML PNKT    Inject 0.4 mLs (40 mg total) into the skin every 7 days.    ATORVASTATIN (LIPITOR) 20 MG TABLET    Take 20 mg by mouth.    BUPROPION (WELLBUTRIN XL) 150 MG TB24 TABLET    Take 1 tablet (150 mg total) by mouth once daily.    DULOXETINE (CYMBALTA) 30 MG CAPSULE    Take 1 capsule (30 mg total) by mouth once daily.    ESTROGENS,ESTERIFIED,-METHYLTESTOSTERONE 0.625-1.25MG (ESTRATEST HS) PER TABLET    Take 1 tablet by mouth once daily.    HYDROXYCHLOROQUINE (PLAQUENIL) 200 MG TABLET    Take 1 tablet (200 mg total) by mouth 2 (two) times daily.    INDOMETHACIN (INDOCIN) 50 MG CAPSULE    Take 1 capsule (50 mg total) by mouth daily as needed (pain).    MEDROXYPROGESTERONE (PROVERA) 2.5 MG TABLET    Take 1 tablet (2.5 mg total) by mouth once daily.    PANTOPRAZOLE (PROTONIX) 40 MG TABLET    Take 1 tablet (40 mg total) by mouth once daily.    TIRZEPATIDE, WEIGHT LOSS, 5 MG/0.5 ML SOLN    Inject 5 mg into the skin every 7 days.    TIZANIDINE (ZANAFLEX) 2 MG TABLET    Take 1 tablet (2 mg total) by mouth 2 (two) times daily.    Modified Medications    No medications on file   Discontinued Medications    TIRZEPATIDE 2.5 MG/0.5 ML PNIJ    Inject 2.5 mg into the skin every 7 days.       This note was generated with the assistance of ambient listening technology. Verbal consent was obtained by the patient and accompanying visitor(s) for the recording of patient appointment to facilitate this note. I attest to having reviewed and edited the generated note for accuracy, though some syntax or spelling errors may persist. Please contact the author of this note for any clarification.         Brandy Sawyer NP

## 2025-05-20 ENCOUNTER — TELEPHONE (OUTPATIENT)
Dept: OPHTHALMOLOGY | Facility: CLINIC | Age: 60
End: 2025-05-20
Payer: COMMERCIAL

## 2025-05-20 ENCOUNTER — OFFICE VISIT (OUTPATIENT)
Dept: RHEUMATOLOGY | Facility: CLINIC | Age: 60
End: 2025-05-20
Payer: COMMERCIAL

## 2025-05-20 VITALS
DIASTOLIC BLOOD PRESSURE: 77 MMHG | HEART RATE: 92 BPM | WEIGHT: 166.69 LBS | HEIGHT: 68 IN | OXYGEN SATURATION: 100 % | SYSTOLIC BLOOD PRESSURE: 113 MMHG | BODY MASS INDEX: 25.26 KG/M2 | RESPIRATION RATE: 19 BRPM

## 2025-05-20 DIAGNOSIS — M06.00 SERONEGATIVE RHEUMATOID ARTHRITIS: Primary | ICD-10-CM

## 2025-05-20 DIAGNOSIS — R93.89 ABNORMAL CT OF THE CHEST: ICD-10-CM

## 2025-05-20 DIAGNOSIS — M70.62 GREATER TROCHANTERIC BURSITIS OF LEFT HIP: ICD-10-CM

## 2025-05-20 DIAGNOSIS — D84.821 IMMUNODEFICIENCY DUE TO LONG TERM DRUG THERAPY: ICD-10-CM

## 2025-05-20 DIAGNOSIS — Z79.899 IMMUNODEFICIENCY DUE TO LONG TERM DRUG THERAPY: ICD-10-CM

## 2025-05-20 PROCEDURE — 99999 PR PBB SHADOW E&M-EST. PATIENT-LVL V: CPT | Mod: PBBFAC,,, | Performed by: STUDENT IN AN ORGANIZED HEALTH CARE EDUCATION/TRAINING PROGRAM

## 2025-05-20 PROCEDURE — 1159F MED LIST DOCD IN RCRD: CPT | Mod: CPTII,S$GLB,, | Performed by: STUDENT IN AN ORGANIZED HEALTH CARE EDUCATION/TRAINING PROGRAM

## 2025-05-20 PROCEDURE — 99214 OFFICE O/P EST MOD 30 MIN: CPT | Mod: 25,S$GLB,, | Performed by: STUDENT IN AN ORGANIZED HEALTH CARE EDUCATION/TRAINING PROGRAM

## 2025-05-20 PROCEDURE — 20610 DRAIN/INJ JOINT/BURSA W/O US: CPT | Mod: LT,S$GLB,, | Performed by: STUDENT IN AN ORGANIZED HEALTH CARE EDUCATION/TRAINING PROGRAM

## 2025-05-20 PROCEDURE — 3044F HG A1C LEVEL LT 7.0%: CPT | Mod: CPTII,S$GLB,, | Performed by: STUDENT IN AN ORGANIZED HEALTH CARE EDUCATION/TRAINING PROGRAM

## 2025-05-20 PROCEDURE — 3008F BODY MASS INDEX DOCD: CPT | Mod: CPTII,S$GLB,, | Performed by: STUDENT IN AN ORGANIZED HEALTH CARE EDUCATION/TRAINING PROGRAM

## 2025-05-20 PROCEDURE — 3078F DIAST BP <80 MM HG: CPT | Mod: CPTII,S$GLB,, | Performed by: STUDENT IN AN ORGANIZED HEALTH CARE EDUCATION/TRAINING PROGRAM

## 2025-05-20 PROCEDURE — 3074F SYST BP LT 130 MM HG: CPT | Mod: CPTII,S$GLB,, | Performed by: STUDENT IN AN ORGANIZED HEALTH CARE EDUCATION/TRAINING PROGRAM

## 2025-05-20 RX ORDER — LIDOCAINE HYDROCHLORIDE 10 MG/ML
2 INJECTION, SOLUTION EPIDURAL; INFILTRATION; INTRACAUDAL; PERINEURAL
Status: DISCONTINUED | OUTPATIENT
Start: 2025-05-20 | End: 2025-05-20 | Stop reason: HOSPADM

## 2025-05-20 RX ORDER — TRIAMCINOLONE ACETONIDE 40 MG/ML
40 INJECTION, SUSPENSION INTRA-ARTICULAR; INTRAMUSCULAR
Status: DISCONTINUED | OUTPATIENT
Start: 2025-05-20 | End: 2025-05-20 | Stop reason: HOSPADM

## 2025-05-20 RX ADMIN — LIDOCAINE HYDROCHLORIDE 2 ML: 10 INJECTION, SOLUTION EPIDURAL; INFILTRATION; INTRACAUDAL; PERINEURAL at 11:05

## 2025-05-20 RX ADMIN — TRIAMCINOLONE ACETONIDE 40 MG: 40 INJECTION, SUSPENSION INTRA-ARTICULAR; INTRAMUSCULAR at 11:05

## 2025-05-20 NOTE — PROCEDURES
Large Joint Aspiration/Injection: L greater trochanteric bursa    Date/Time: 5/20/2025 11:20 AM    Performed by: Argentina Lauren MD  Authorized by: Argentina Lauren MD    Consent Done?:  Yes (Written)  Indications:  Pain    Local anesthesia used?: Yes    Local anesthetic:  Co-phenylcaine spray    Details:  Needle Size:  25 G  Ultrasonic Guidance for needle placement?: No    Location:  Hip  Site:  L greater trochanteric bursa  Medications:  2 mL LIDOcaine (PF) 10 mg/ml (1%) 10 mg/mL (1 %); 40 mg triamcinolone acetonide 40 mg/mL

## 2025-05-20 NOTE — PROGRESS NOTES
Subjective:      Patient ID: Carly Carmen is a 57 y.o. female.    Chief Complaint: Disease Management and Establish Care.     HPI     59 y.o.  F with no significant PMH here for follow up in RA    She is new to me. Used to follow with Dr. Velarde at [place]. Last visit in 5/2023.     She started to have joint pain beginning of September 2022. She has pain in shoulders, wrists, and hands.  She gets swelling in hands and wrists for the last several months. She is stiff in hands  and wrists all day.  She takes indomethacin 50 mg every 6 months.She is taking plaquenil 200mg po BID for last month.  Pain level is 8/10. She has trouble getting dressed, opening door knob, and driving.  Denies any rashes, oral ulcers, or alopecia.  She smoked 1 ppd for about 30 years. She quit smoking about 4 years ago and now is vaping.    MRI bilateral hands 2/2023:  1. Constellation of findings most suggestive of sequela of rheumatoid arthritis with moderate to severe disease activity and high risk of future aggressiveness.    She has hx of one episode of hydradenitis suppurative in groin that required excision in 2016.     Family hx: negative for RA or SLE     Interval history  -Last visit on 1/2025  -she is currently on Humira 40 mg SC q7 days, hydroxychloroquine 200 mg BID. Uses indocin PRN  -she has been having pain on left greater trochanter, does not let her sleep, sometimes it can radiate down to knee.   -denies any side effects or injection reaction from Humira.  -she has not seen pulm in about a year. Has not had repeat CT chest  -she continues to vape     Answers submitted by the patient for this visit:  Rheumatology Questionnaire (Submitted on 5/17/2025)  fever: No  eye redness: No  mouth sores: No  headaches: No  shortness of breath: No  chest pain: No  trouble swallowing: No  diarrhea: No  constipation: No  unexpected weight change: No  genital sore: No  dysuria: Yes  During the last 3 days, have you had a skin rash?:  No  Bruises or bleeds easily: No  cough: No      Past Medical History:   Diagnosis Date    Abnormal Pap smear of cervix years ago    colpo done    Allergy     Chronic hip pain     Hidradenitis suppurativa 4/8/2016     Review of Systems   Constitutional:  Negative for activity change, appetite change, chills, diaphoresis, fatigue and fever.   HENT:  Negative for congestion, ear discharge, ear pain, facial swelling, mouth sores, sinus pressure, sneezing, sore throat, tinnitus and trouble swallowing.    Eyes:  Negative for photophobia, pain, discharge, redness, itching and visual disturbance.   Respiratory:  Negative for apnea, cough, chest tightness, shortness of breath, wheezing and stridor.    Cardiovascular:  Negative for chest pain and leg swelling.   Gastrointestinal:  Negative for abdominal distention, abdominal pain, anal bleeding, blood in stool, constipation, diarrhea and nausea.   Endocrine: Negative for cold intolerance and heat intolerance.   Genitourinary:  Negative for difficulty urinating, dysuria and genital sores.   Musculoskeletal:  Positive for arthralgias, back pain, gait problem and joint swelling. Negative for myalgias, neck pain and neck stiffness.   Skin:  Negative for color change, pallor, rash and wound.   Neurological:  Negative for dizziness, seizures, light-headedness, numbness and headaches.   Hematological:  Negative for adenopathy. Does not bruise/bleed easily.   Psychiatric/Behavioral:  Negative for sleep disturbance. The patient is not nervous/anxious.          Objective:     Vitals:    05/20/25 1104   BP: 113/77   Pulse: 92   Resp: 19       Physical Exam   Constitutional: She is oriented to person, place, and time.   HENT:   Head: Normocephalic and atraumatic.   Nose: Nose normal.   Mouth/Throat: Oropharynx is clear and moist.  Neck: Supple  Pulmonary/Chest: Effort normal   Musculoskeletal:         General: No tenderness.      Cervical back: Neck supple.      Right 2nd MCP, right  wrist with mild synovitis. Right shoulder with normal ROM     Left shoulder with positive signs of impingement.   Lymphadenopathy:     She has no cervical adenopathy.   Neurological: She is alert and oriented to person, place, and time. No cranial nerve deficit.   Skin: Skin is dry. No bruising and no rash noted. No erythema.   Psychiatric: Affect and judgment normal.        Assessment:   59 y.o.  F with no significant PMH here for follow up of seronegative RA. - chronic and stable   Clinical exam consistent with RA. Humira started on 5/2023 due to high disease activity which has helped significantly but effect was wearing off at 10 days. She was also taking  mg daily (under dosed). Symptoms improved after changing Humira to every 7 days and increasing HCQ to 200 mg BID. Tried tapering HCQ but were unsuccessful.     She is to follow up with pulmonary for abnormal CT chest.    She request referral to another pulm that is closer to here. Recommended Dr. Plata here in [place].     DEXA scan 12/2021, 2/2025 normal    Drug induced immunodeficiency - chronic and stable   No live vaccines. Vaccines per guidelines. immunosuppression/infectious precautions reinforced    Labs every 6 months to monitor HCQ and humira.     Left greater trochanteric bursitis    Proceed with CSI today. See procedure note. Post injection care discussed.       Plan:   Continue Humira 40 mg SC every 7 days  Continue HCQ  200 mg BID   Continue indomethacin as needed   Labs every 6 months to monitor for humira and plaquenil.   In the future can consider other TNFs, IL-6 or Antonio if worsening symptoms   Referral to pulm due to abnormal CT chest in the past and emphysema. Defer repeating CT chest to pulm  Referral to ophthalmology for annual eye exam for plaquenil.     Follow up in about 4 months (around 9/20/2025). Or sooner PRN for disease management     Method of contact with patient concerns: [facility] attn Rheumatology    Disclaimer:  This note  is prepared using voice recognition software and as such is likely to have errors and has not been proof read. Please contact me for questions.     Argentina Phan M.D.  Rheumatology Department   [facility]

## 2025-05-20 NOTE — TELEPHONE ENCOUNTER
----- Message from Rubi sent at 5/20/2025  4:05 PM CDT -----  Contact: pt @ [phone]  Carly Carmen calling regarding Appointment Access  (message) for #pt is calling to get np appt, pt has referral in chart. Asking for call back

## 2025-05-21 ENCOUNTER — OFFICE VISIT (OUTPATIENT)
Facility: CLINIC | Age: 60
End: 2025-05-21
Payer: COMMERCIAL

## 2025-05-21 VITALS — OXYGEN SATURATION: 100 % | WEIGHT: 167.56 LBS | BODY MASS INDEX: 25.48 KG/M2

## 2025-05-21 DIAGNOSIS — M06.00 SERONEGATIVE RHEUMATOID ARTHRITIS: Primary | Chronic | ICD-10-CM

## 2025-05-21 DIAGNOSIS — R93.89 ABNORMAL CT OF THE CHEST: ICD-10-CM

## 2025-05-21 DIAGNOSIS — Z79.620 LONG TERM (CURRENT) USE OF IMMUNOSUPPRESSIVE BIOLOGIC: ICD-10-CM

## 2025-05-21 DIAGNOSIS — Z87.891 HISTORY OF SMOKING 30 OR MORE PACK YEARS: ICD-10-CM

## 2025-05-21 PROCEDURE — 99999 PR PBB SHADOW E&M-EST. PATIENT-LVL IV: CPT | Mod: PBBFAC,,, | Performed by: INTERNAL MEDICINE

## 2025-05-21 NOTE — PROGRESS NOTES
[facility]    Today's Date: 2025    subjective    History of Present Illness    CHIEF COMPLAINT:  - Carly presents for a follow-up regarding lung findings on a previous CT.    HPI:  - Carly was last evaluated by a pulmonary doctor in . A CT showed a stable, regular-shaped opacity in the right middle lobe, potentially related to atrophic changes or scarring from malignancy, although malignancy could not be excluded. No detrimental change was noted. Carly has a history of seronegative rheumatoid arthritis, for which she sees a rheumatologist and has been on Humira for over a year. She reports intentional weight loss and indicates a need to see an eye doctor due to her current medications. She denies problems breathing and coughing up anything.    MEDICAL HISTORY:  - Seronegative rheumatoid arthritis  - History of smokin years, quit 7 years ago      ROS:  ROS as indicated in HPI.          Results for orders placed during the hospital encounter of 23    CT Chest Without Contrast    Narrative  EXAMINATION:  CT CHEST WITHOUT CONTRAST    CLINICAL HISTORY:  Abnormal xray - interstitial infiltrates;Follow-up for RML infiltrate; Immunodeficiency due to drugs    TECHNIQUE:  Low dose axial images, sagittal and coronal reformations were obtained from the thoracic inlet to the lung bases. Contrast was not administered.    COMPARISON:  2023.    FINDINGS:  There is a calcified nodule within the caudal right lobe of the thyroid gland as before.  Soft tissues at the base of the neck are otherwise unremarkable.  The heart is not enlarged.  There is atherosclerotic calcification within the thoracic aorta which is normal in caliber without aneurysmal dilatation.  No hilar or mediastinal adenopathy is identified.  No axillary adenopathy is identified.  There is mild biapical parenchymal scarring.  Centrilobular emphysematous changes are again identified.  Once again, an irregularly shaped area  of consolidation is identified within the lateral right middle lobe and this does not appear significantly changed when compared to the patient's prior examination dated 03/13/2023 and 02/27/2023.  There are mild dependent atelectatic changes within the lungs.  No new lung opacities are identified.  There is no evidence for pneumothorax or pleural effusions.  Bony structures appear intact.  Visualized upper abdomen is unremarkable.    Impression  Stable irregularly-shaped opacity within the right middle lobe.  Findings may be related to atelectasis or scarring although malignancy cannot be completely excluded.  No detrimental interval change noted.    Centrilobular emphysema.      Electronically signed by: Arie Seay MD  Date:    06/27/2023  Time:    13:54       objective   There were no vitals filed for this visit.  Physical Exam    Lungs: All normal.  Cardiovascular: All normal.  IMAGING:  - CT Chest: 2023, showed stable, regular-shaped opacity in the right middle lobe. Findings may be related to atrophic changes or scarring from malignancy, although malignancy cannot be excluded. No detrimental change noted.       assessment & plan     Assessment & Plan    ABNORMAL CT OF THE CHEST:   Reviewed CT Chest from 2023 showing stable, regular-shaped opacity in the right middle lobe.   Assessed opacity as likely scarring, not indicative of malignancy.   Discussed that radiology reports often list all possibilities, including unlikely scenarios.   Ordered CT Chest.   Follow up in 1 year for next annual CT.   Contact the office only if CT results show concerning findings.    HISTORY OF SMOKING 30 OR MORE PACK YEARS:   Determined annual CT warranted due to 30-year smoking history.   Explained importance of annual CT for patients with >20 year smoking history to monitor for potential lung cancer.         Summary/Orders  1. Abnormal CT of the chest  - Ambulatory referral/consult to Pulmonology    2. Seronegative rheumatoid  arthritis    3. Long term (current) use of immunosuppressive biologic    4. History of smoking 30 or more pack years  - CT Chest Lung Screening Low Dose; Future         Mono Plata MD  Pulmonary & Critical Care Medicine  [facility] -    - [phone]    This note was generated with the assistance of ambient listening technology. Verbal consent was obtained by the patient and accompanying visitor(s) for the recording of patient appointment to facilitate this note. I attest to having reviewed and edited the generated note for accuracy, though some syntax or spelling errors may persist. Please contact the author of this note for any clarification.

## 2025-05-22 ENCOUNTER — TELEPHONE (OUTPATIENT)
Dept: PULMONOLOGY | Facility: CLINIC | Age: 60
End: 2025-05-22
Payer: COMMERCIAL

## 2025-05-22 ENCOUNTER — RESULTS FOLLOW-UP (OUTPATIENT)
Facility: CLINIC | Age: 60
End: 2025-05-22

## 2025-05-22 ENCOUNTER — TELEPHONE (OUTPATIENT)
Dept: PSYCHIATRY | Facility: CLINIC | Age: 60
End: 2025-05-22
Payer: COMMERCIAL

## 2025-05-22 DIAGNOSIS — Z87.891 HISTORY OF SMOKING 30 OR MORE PACK YEARS: Primary | ICD-10-CM

## 2025-05-22 DIAGNOSIS — R91.1 PULMONARY NODULE: ICD-10-CM

## 2025-05-22 NOTE — TELEPHONE ENCOUNTER
----- Message from Gianni Plata MD sent at 5/22/2025  2:15 PM CDT -----  Increasing size of RML opacity compared to 2023 imaging. PET CT ordered.   ----- Message -----  From: Kathy, Rad Results In  Sent: 5/22/2025   1:54 PM CDT  To: Gianni Plata MD

## 2025-05-22 NOTE — TELEPHONE ENCOUNTER
Patient scheduled for ebus/robotic bronchoscopy consult with Dr Gamez on 6/24/25 at 8:30am per secure chat message from Dr Mono Plata.  Appointment mailed.

## 2025-05-22 NOTE — TELEPHONE ENCOUNTER
Left a voicemail to schedule patient for a NM PET CT. Informed patient to contact the office back to get the patient scheduled at [place].

## 2025-05-23 ENCOUNTER — PATIENT MESSAGE (OUTPATIENT)
Dept: ADMINISTRATIVE | Facility: OTHER | Age: 60
End: 2025-05-23
Payer: COMMERCIAL

## 2025-05-23 ENCOUNTER — TELEPHONE (OUTPATIENT)
Facility: CLINIC | Age: 60
End: 2025-05-23
Payer: COMMERCIAL

## 2025-05-26 ENCOUNTER — HOSPITAL ENCOUNTER (OUTPATIENT)
Dept: RADIOLOGY | Facility: HOSPITAL | Age: 60
Discharge: HOME | End: 2025-05-26
Attending: INTERNAL MEDICINE
Payer: COMMERCIAL

## 2025-05-26 DIAGNOSIS — Z87.891 HISTORY OF SMOKING 30 OR MORE PACK YEARS: ICD-10-CM

## 2025-05-26 DIAGNOSIS — R91.1 PULMONARY NODULE: ICD-10-CM

## 2025-05-26 LAB — POCT GLUCOSE: 69 MG/DL (ref 70–110)

## 2025-05-26 PROCEDURE — A9552 F18 FDG: HCPCS | Performed by: INTERNAL MEDICINE

## 2025-05-26 PROCEDURE — 78815 PET IMAGE W/CT SKULL-THIGH: CPT | Mod: 26,PI,, | Performed by: NUCLEAR MEDICINE

## 2025-05-26 PROCEDURE — 78815 PET IMAGE W/CT SKULL-THIGH: CPT | Mod: TC

## 2025-05-26 RX ORDER — FLUDEOXYGLUCOSE F18 500 MCI/ML
13.24 INJECTION INTRAVENOUS
Status: COMPLETED | OUTPATIENT
Start: 2025-05-26 | End: 2025-05-26

## 2025-05-26 RX ADMIN — FLUDEOXYGLUCOSE F-18 13.24 MILLICURIE: 500 INJECTION INTRAVENOUS at 07:05

## 2025-06-16 ENCOUNTER — HOSPITAL ENCOUNTER (OUTPATIENT)
Dept: PULMONOLOGY | Facility: CLINIC | Age: 60
Discharge: HOME | End: 2025-06-16
Payer: COMMERCIAL

## 2025-06-16 ENCOUNTER — OFFICE VISIT (OUTPATIENT)
Dept: PULMONOLOGY | Facility: CLINIC | Age: 60
End: 2025-06-16
Payer: COMMERCIAL

## 2025-06-16 VITALS
SYSTOLIC BLOOD PRESSURE: 135 MMHG | OXYGEN SATURATION: 100 % | BODY MASS INDEX: 24.96 KG/M2 | HEART RATE: 83 BPM | DIASTOLIC BLOOD PRESSURE: 82 MMHG | HEIGHT: 68 IN | WEIGHT: 164.69 LBS

## 2025-06-16 DIAGNOSIS — J43.2 CENTRILOBULAR EMPHYSEMA: ICD-10-CM

## 2025-06-16 DIAGNOSIS — R91.1 RIGHT LOWER LOBE PULMONARY NODULE: ICD-10-CM

## 2025-06-16 DIAGNOSIS — R91.1 RIGHT MIDDLE LOBE PULMONARY NODULE: Primary | ICD-10-CM

## 2025-06-16 PROCEDURE — 99999 PR PBB SHADOW E&M-EST. PATIENT-LVL V: CPT | Mod: PBBFAC,,, | Performed by: INTERNAL MEDICINE

## 2025-06-16 NOTE — PROGRESS NOTES
"Subjective:       Patient ID: Carly Carmen is a 59 y.o. female.    Chief Complaint:Lung mass    60 yo former smoker with a history of seronegative RA on Humira daily.  Asymptomatic from the respiratory stand point.  No dyspnea on exertion, no cough.  Referred here to consider bronchoscopy with biopsy by Dr Mono Plata.  Pulmonary nodule was initially detected in 2013 when patient developed symptoms of arthritis.  As it was seronegative, it was difficult to confirm the dx. Serial image over the past two years described some growth.  SUV 2.7 on PET.      Patient has no history of  anesthesia complications.      On tirzepetide for weight loss for two months, has lost 20#  Review of Systems    Objective:      Vitals:    06/16/25 0830   BP: 135/82   BP Location: Right arm   Patient Position: Sitting   Pulse: 83   SpO2: 100%   Weight: 74.7 kg (164 lb 10.9 oz)   Height: 5' 8" (1.727 m)      Physical Exam   Constitutional: She is oriented to person, place, and time. She appears well-developed and well-nourished.   HENT:   Head: Normocephalic.   Cardiovascular: Normal rate and regular rhythm.   Pulmonary/Chest: Normal expansion and breath sounds normal. She has no wheezes.   Musculoskeletal:         General: No edema. Normal range of motion.      Cervical back: Normal range of motion and neck supple.   Lymphadenopathy: No supraclavicular adenopathy is present.     She has no cervical adenopathy.   Neurological: She is alert and oriented to person, place, and time.   Psychiatric: She has a normal mood and affect.     Personal Diagnostic Review            6/16/2025     8:30 AM 5/21/2025     1:07 PM 5/20/2025    11:04 AM 5/13/2025    10:34 AM 4/10/2025     9:29 AM 3/7/2025     8:26 AM 11/8/2024     1:58 PM   Pulmonary Function Tests   SpO2 100 % 100 % 100 % 96 % 98 % 98 % 98 %   Height 5' 8" (1.727 m)  5' 8" (1.727 m) 5' 8" (1.727 m) 5' 8" (1.727 m) 5' 8" (1.727 m) 5' 8" (1.727 m)   Weight 74.7 kg (164 lb 10.9 oz) 76 kg " (167 lb 8.8 oz) 75.6 kg (166 lb 10.7 oz) 76.9 kg (169 lb 6.8 oz) 80.2 kg (176 lb 12.9 oz) 81.9 kg (180 lb 8.9 oz) 82.2 kg (181 lb 3.5 oz)   BMI (Calculated) 25 25.5 25.3 25.8 26.9 27.5 27.6         Assessment:       1. Right middle lobe pulmonary nodule    2. Centrilobular emphysema    3. Right lower lobe pulmonary nodule        Encounter Medications[1]  Orders Placed This Encounter   Procedures    Complete PFT w/ bronchodilator     Standing Status:   Future     Number of Occurrences:   1     Expiration Date:   6/16/2026     Release to patient:   Immediate    Case Request Operating Room: ROBOTIC BRONCHOSCOPY     Standing Status:   Standing     Number of Occurrences:   1     Medical Necessity::   Medically Urgent [101]     Case classification:   E - Elective [90]     Post-Procedure Disposition::   Amb Surgery/DOSC [2]     Is an on-site pathologist required for this procedure?:   N/A     Plan:     Problem List Items Addressed This Visit       Centrilobular emphysema (Chronic)    Overview   CT chest with upper lung zone predominant centrilobular emphysema and biapical scarring.  30 pack year history.  Quit 8 years ago. Asymptomatic  PFTs without obstruction, mild reduction in diffusion.           Relevant Orders    Complete PFT w/ bronchodilator (Completed)     Other Visit Diagnoses         Right middle lobe pulmonary nodule    -  Primary    Relevant Orders    Complete PFT w/ bronchodilator (Completed)    Case Request Operating Room: ROBOTIC BRONCHOSCOPY (Completed)      Right lower lobe pulmonary nodule            Diagnostic and staging robotic bronchoscopy with EBUS 6/20/25    I have explained the risks, benefits and alternatives of the procedure in detail.  The patient voices understanding and all questions have been answered.  The patient agrees to proceed as planned.            [1]   Outpatient Encounter Medications as of 6/16/2025   Medication Sig Dispense Refill    adalimumab (HUMIRA,CF, PEN) 40 mg/0.4 mL PnKt  Inject 0.4 mLs (40 mg total) into the skin every 7 days. 4 pen 11    atorvastatin (LIPITOR) 20 MG tablet Take 20 mg by mouth.      buPROPion (WELLBUTRIN XL) 150 MG TB24 tablet Take 1 tablet (150 mg total) by mouth once daily. 90 tablet 3    DULoxetine (CYMBALTA) 30 MG capsule Take 1 capsule (30 mg total) by mouth once daily. 90 capsule 3    estrogens,esterified,-methyltestosterone 0.625-1.25mg (ESTRATEST HS) per tablet Take 1 tablet by mouth once daily. 90 tablet 3    hydroxychloroquine (PLAQUENIL) 200 mg tablet Take 1 tablet (200 mg total) by mouth 2 (two) times daily. 180 tablet 5    indomethacin (INDOCIN) 50 MG capsule Take 1 capsule (50 mg total) by mouth daily as needed (pain). 90 capsule 1    medroxyPROGESTERone (PROVERA) 2.5 MG tablet Take 1 tablet (2.5 mg total) by mouth once daily. 90 tablet 3    pantoprazole (PROTONIX) 40 MG tablet Take 1 tablet (40 mg total) by mouth once daily. 90 tablet 3    tirzepatide, weight loss, 5 mg/0.5 mL Soln Inject 5 mg into the skin every 7 days.      tiZANidine (ZANAFLEX) 2 MG tablet Take 1 tablet (2 mg total) by mouth 2 (two) times daily. 90 tablet 0     No facility-administered encounter medications on file as of 6/16/2025.

## 2025-06-16 NOTE — H&P (VIEW-ONLY)
"Subjective:       Patient ID: Carly Carmen is a 59 y.o. female.    Chief Complaint:Lung mass    58 yo former smoker with a history of seronegative RA on Humira daily.  Asymptomatic from the respiratory stand point.  No dyspnea on exertion, no cough.  Referred here to consider bronchoscopy with biopsy by Dr Mono Plata.  Pulmonary nodule was initially detected in 2013 when patient developed symptoms of arthritis.  As it was seronegative, it was difficult to confirm the dx. Serial image over the past two years described some growth.  SUV 2.7 on PET.      Patient has no history of  anesthesia complications.      On tirzepetide for weight loss for two months, has lost 20#  Review of Systems    Objective:      Vitals:    06/16/25 0830   BP: 135/82   BP Location: Right arm   Patient Position: Sitting   Pulse: 83   SpO2: 100%   Weight: 74.7 kg (164 lb 10.9 oz)   Height: 5' 8" (1.727 m)      Physical Exam   Constitutional: She is oriented to person, place, and time. She appears well-developed and well-nourished.   HENT:   Head: Normocephalic.   Cardiovascular: Normal rate and regular rhythm.   Pulmonary/Chest: Normal expansion and breath sounds normal. She has no wheezes.   Musculoskeletal:         General: No edema. Normal range of motion.      Cervical back: Normal range of motion and neck supple.   Lymphadenopathy: No supraclavicular adenopathy is present.     She has no cervical adenopathy.   Neurological: She is alert and oriented to person, place, and time.   Psychiatric: She has a normal mood and affect.     Personal Diagnostic Review            6/16/2025     8:30 AM 5/21/2025     1:07 PM 5/20/2025    11:04 AM 5/13/2025    10:34 AM 4/10/2025     9:29 AM 3/7/2025     8:26 AM 11/8/2024     1:58 PM   Pulmonary Function Tests   SpO2 100 % 100 % 100 % 96 % 98 % 98 % 98 %   Height 5' 8" (1.727 m)  5' 8" (1.727 m) 5' 8" (1.727 m) 5' 8" (1.727 m) 5' 8" (1.727 m) 5' 8" (1.727 m)   Weight 74.7 kg (164 lb 10.9 oz) 76 kg " (167 lb 8.8 oz) 75.6 kg (166 lb 10.7 oz) 76.9 kg (169 lb 6.8 oz) 80.2 kg (176 lb 12.9 oz) 81.9 kg (180 lb 8.9 oz) 82.2 kg (181 lb 3.5 oz)   BMI (Calculated) 25 25.5 25.3 25.8 26.9 27.5 27.6         Assessment:       1. Right middle lobe pulmonary nodule    2. Centrilobular emphysema    3. Right lower lobe pulmonary nodule        Encounter Medications[1]  Orders Placed This Encounter   Procedures    Complete PFT w/ bronchodilator     Standing Status:   Future     Number of Occurrences:   1     Expiration Date:   6/16/2026     Release to patient:   Immediate    Case Request Operating Room: ROBOTIC BRONCHOSCOPY     Standing Status:   Standing     Number of Occurrences:   1     Medical Necessity::   Medically Urgent [101]     Case classification:   E - Elective [90]     Post-Procedure Disposition::   Amb Surgery/DOSC [2]     Is an on-site pathologist required for this procedure?:   N/A     Plan:     Problem List Items Addressed This Visit       Centrilobular emphysema (Chronic)    Overview   CT chest with upper lung zone predominant centrilobular emphysema and biapical scarring.  30 pack year history.  Quit 8 years ago. Asymptomatic  PFTs without obstruction, mild reduction in diffusion.           Relevant Orders    Complete PFT w/ bronchodilator (Completed)     Other Visit Diagnoses         Right middle lobe pulmonary nodule    -  Primary    Relevant Orders    Complete PFT w/ bronchodilator (Completed)    Case Request Operating Room: ROBOTIC BRONCHOSCOPY (Completed)      Right lower lobe pulmonary nodule            Diagnostic and staging robotic bronchoscopy with EBUS 6/20/25    I have explained the risks, benefits and alternatives of the procedure in detail.  The patient voices understanding and all questions have been answered.  The patient agrees to proceed as planned.            [1]   Outpatient Encounter Medications as of 6/16/2025   Medication Sig Dispense Refill    adalimumab (HUMIRA,CF, PEN) 40 mg/0.4 mL PnKt  Inject 0.4 mLs (40 mg total) into the skin every 7 days. 4 pen 11    atorvastatin (LIPITOR) 20 MG tablet Take 20 mg by mouth.      buPROPion (WELLBUTRIN XL) 150 MG TB24 tablet Take 1 tablet (150 mg total) by mouth once daily. 90 tablet 3    DULoxetine (CYMBALTA) 30 MG capsule Take 1 capsule (30 mg total) by mouth once daily. 90 capsule 3    estrogens,esterified,-methyltestosterone 0.625-1.25mg (ESTRATEST HS) per tablet Take 1 tablet by mouth once daily. 90 tablet 3    hydroxychloroquine (PLAQUENIL) 200 mg tablet Take 1 tablet (200 mg total) by mouth 2 (two) times daily. 180 tablet 5    indomethacin (INDOCIN) 50 MG capsule Take 1 capsule (50 mg total) by mouth daily as needed (pain). 90 capsule 1    medroxyPROGESTERone (PROVERA) 2.5 MG tablet Take 1 tablet (2.5 mg total) by mouth once daily. 90 tablet 3    pantoprazole (PROTONIX) 40 MG tablet Take 1 tablet (40 mg total) by mouth once daily. 90 tablet 3    tirzepatide, weight loss, 5 mg/0.5 mL Soln Inject 5 mg into the skin every 7 days.      tiZANidine (ZANAFLEX) 2 MG tablet Take 1 tablet (2 mg total) by mouth 2 (two) times daily. 90 tablet 0     No facility-administered encounter medications on file as of 6/16/2025.

## 2025-06-19 ENCOUNTER — PATIENT MESSAGE (OUTPATIENT)
Dept: PULMONOLOGY | Facility: CLINIC | Age: 60
End: 2025-06-19
Payer: COMMERCIAL

## 2025-06-19 ENCOUNTER — ANESTHESIA EVENT (OUTPATIENT)
Dept: SURGERY | Facility: HOSPITAL | Age: 60
End: 2025-06-19
Payer: COMMERCIAL

## 2025-06-19 DIAGNOSIS — F32.A DEPRESSION, UNSPECIFIED DEPRESSION TYPE: ICD-10-CM

## 2025-06-19 LAB
DLCO SINGLE BREATH LLN: 19.35
DLCO SINGLE BREATH PRE REF: 73.6 %
DLCO SINGLE BREATH REF: 25.08
DLCOC SBVA LLN: 3.18
DLCOC SBVA REF: 4.47
DLCOC SINGLE BREATH LLN: 19.35
DLCOC SINGLE BREATH REF: 25.08
DLCOCSBVAULN: 5.76
DLCOCSINGLEBREATHULN: 30.81
DLCOSINGLEBREATHULN: 30.81
DLCOSINGLEBREATHZSCORE: -1.9
DLCOVA LLN: 3.18
DLCOVA PRE REF: 79.7 %
DLCOVA PRE: 3.56 ML/(MIN*MMHG*L) (ref 3.18–5.76)
DLCOVA REF: 4.47
DLCOVAULN: 5.76
ERVN2 LLN: -16449.17
ERVN2 PRE REF: 157.8 %
ERVN2 PRE: 1.31 L (ref -16449.17–16450.83)
ERVN2 REF: 0.83
ERVN2ULN: ABNORMAL
FEF 25 75 LLN: 1.61
FEF 25 75 PRE REF: 67.9 %
FEF 25 75 REF: 3.01
FET100 CHG: 1.6 %
FEV05 LLN: 1.17
FEV05 REF: 2.02
FEV1 CHG: 1.4 %
FEV1 FVC LLN: 66
FEV1 FVC PRE REF: 93.5 %
FEV1 FVC REF: 79
FEV1 LLN: 2.09
FEV1 PRE REF: 99.1 %
FEV1 REF: 2.78
FEV1 VOL CHG: 0.04
FRCN2 LLN: 2.11
FRCN2 PRE REF: 105.9 %
FRCN2 REF: 2.93
FRCN2ULN: 3.75
FVC CHG: 0.7 %
FVC LLN: 2.69
FVC PRE REF: 105.1 %
FVC REF: 3.57
FVC VOL CHG: 0.03
ICN2REF: 2.48
IVC PRE: 3.58 L (ref 2.69–4.49)
IVC SINGLE BREATH LLN: 2.69
IVC SINGLE BREATH PRE REF: 100.1 %
IVC SINGLE BREATH REF: 3.57
IVCSINGLEBREATHULN: 4.49
LLN IC N2: -16447.52
PEF LLN: 4.94
PEF PRE REF: 82.4 %
PEF REF: 6.87
PHYSICIAN COMMENT: ABNORMAL
POST FEF 25 75: 2.06 L/S (ref 1.61–4.4)
POST FET 100: 7.56 SEC
POST FEV1 FVC: 74.03 % (ref 66.49–88.99)
POST FEV1: 2.8 L (ref 2.09–3.45)
POST FEV5: 2.22 L (ref 1.17–2.88)
POST FVC: 3.78 L (ref 2.69–4.49)
POST PEF: 7.62 L/S (ref 4.94–8.8)
PRE DLCO: 18.45 ML/(MIN*MMHG) (ref 19.35–30.81)
PRE FEF 25 75: 2.04 L/S (ref 1.61–4.4)
PRE FET 100: 7.44 SEC
PRE FEV05 REF: 107.8 %
PRE FEV1 FVC: 73.48 % (ref 66.49–88.99)
PRE FEV1: 2.76 L (ref 2.09–3.45)
PRE FEV5: 2.18 L (ref 1.17–2.88)
PRE FRC N2: 3.1 L (ref 2.11–3.75)
PRE FVC: 3.75 L (ref 2.69–4.49)
PRE IC N2: 2.45 L (ref -16447.52–16452.48)
PRE PEF: 5.67 L/S (ref 4.94–8.8)
PRE REF IC N2: 98.8 %
RVN2 LLN: 1.53
RVN2 PRE REF: 85.5 %
RVN2 PRE: 1.8 L (ref 1.53–2.68)
RVN2 REF: 2.1
RVN2TLCN2 LLN: 30.11
RVN2TLCN2 PRE REF: 81.5 %
RVN2TLCN2 PRE: 32.37 % (ref 30.11–49.29)
RVN2TLCN2 REF: 39.7
RVN2TLCN2ULN: 49.29
RVN2ULN: 2.68
TLCN2 LLN: 4.62
TLCN2 PRE REF: 99 %
TLCN2 PRE: 5.55 L (ref 4.62–6.6)
TLCN2 REF: 5.61
TLCN2ULN: 6.6
ULN IC N2: ABNORMAL
VA PRE: 5.18 L (ref 5.46–5.46)
VA SINGLE BREATH LLN: 5.46
VA SINGLE BREATH PRE REF: 94.9 %
VA SINGLE BREATH REF: 5.46
VASINGLEBREATHULN: 5.46
VCMAXN2 LLN: 2.69
VCMAXN2 PRE REF: 105.1 %
VCMAXN2 PRE: 3.75 L (ref 2.69–4.49)
VCMAXN2 REF: 3.57
VCMAXN2ULN: 4.49

## 2025-06-19 RX ORDER — BUPROPION HYDROCHLORIDE 300 MG/1
300 TABLET ORAL DAILY
COMMUNITY
End: 2025-06-19 | Stop reason: SDUPTHER

## 2025-06-19 RX ORDER — BUPROPION HYDROCHLORIDE 150 MG/1
150 TABLET ORAL DAILY
Qty: 90 TABLET | Refills: 3 | Status: CANCELLED | OUTPATIENT
Start: 2025-06-19

## 2025-06-19 NOTE — TELEPHONE ENCOUNTER
No care due was identified.  [facility] Embedded Care Due Messages. Reference number: [id number].   6/19/2025 9:40:28 AM CDT

## 2025-06-19 NOTE — ANESTHESIA PREPROCEDURE EVALUATION
[facility]  Anesthesia Pre-Operative Evaluation         Patient Name: Carly Carmen  YOB: 1965  MRN: [id number]    SUBJECTIVE:     Pre-operative evaluation for Procedure(s) (LRB):  ROBOTIC BRONCHOSCOPY (N/A)     06/19/2025    Carly Carmen is a 59 y.o. female w/ a significant PMHx of emphysema, RA,GERD, former smoker, GLP-1 use, and RML and RLL nodule.    Patient now presents for the above procedure(s).    Prev airway: None documented.    Problem List[1]    Review of patient's allergies indicates:  No Known Allergies    Current Inpatient Medications:      Medications Ordered Prior to Encounter[2]    Past Surgical History:   Procedure Laterality Date    BREAST BIOPSY Left 2015    BENIGN    COLONOSCOPY N/A 1/8/2016    Procedure: COLONOSCOPY;  Surgeon: Luis Santana Jr., MD;  Location: [facility];  Service: Endoscopy;  Laterality: N/A;    CYST REMOVAL      subcutaneous cyst to right chest wall    DILATION AND CURETTAGE OF UTERUS         Social History[3]    OBJECTIVE:     Vital Signs Range (Last 24H):         Significant Labs:  Lab Results   Component Value Date    WBC 4.50 02/20/2025    HGB 15.1 02/20/2025    HCT 43.1 02/20/2025     02/20/2025    CHOL 124 04/10/2025    TRIG 70 04/10/2025    HDL 53 04/10/2025    ALT 17 02/20/2025    AST 20 02/20/2025     02/20/2025    K 3.7 02/20/2025     02/20/2025    CREATININE 0.8 02/20/2025    BUN 9 02/20/2025    CO2 25 02/20/2025    TSH 0.622 08/23/2023    HGBA1C 5.2 04/10/2025       Diagnostic Studies: No relevant studies.    EKG:   Results for orders placed or performed during the hospital encounter of 04/11/16   EKG 12-LEAD    Collection Time: 04/11/16 10:34 AM    Narrative    Test Reason : L73.2  Vent. Rate : 073 BPM     Atrial Rate : 073 BPM     P-R Int : 176 ms          QRS Dur : 104 ms      QT Int :  424 ms       P-R-T Axes : 074 063 058 degrees     QTc Int : 467 ms    Normal sinus rhythm  Nonspecific ST abnormality  Abnormal ECG  No previous ECGs available  Confirmed by Yimi IBRAHIM, Laureano ([id number]) on 4/11/2016 5:19:33 PM    Referred By: CAL PICKENS           Confirmed By:Laureano Geller MD       2D ECHO:  TTE:  No results found for this or any previous visit.    JOANN:  No results found for this or any previous visit.    ASSESSMENT/PLAN:           Pre-op Assessment    I have reviewed the Patient Summary Reports.     I have reviewed the Nursing Notes. I have reviewed the NPO Status.   I have reviewed the Medications.     Review of Systems  Anesthesia Hx:  No problems with previous Anesthesia   History of prior surgery of interest to airway management or planning:          Denies Family Hx of Anesthesia complications.    Denies Personal Hx of Anesthesia complications.                    Social:  Former Smoker, Recreational Drugs, Alcohol Use       Hematology/Oncology:    Oncology Normal    -- Denies Anemia:                                  EENT/Dental:  chronic allergic rhinitis           Cardiovascular:     Denies Pacemaker.  Denies Hypertension.   Denies MI.  Denies CAD.    Denies CABG/stent.     Denies CHF.    hyperlipidemia    Half hour stationary bike most days                           Pulmonary:   COPD, mild  Denies Asthma.   Denies Shortness of breath.                  Renal/:   Denies Chronic Renal Disease.                Hepatic/GI:     GERD, well controlled Denies Liver Disease.   Taking GLP-1 Agonists Instructed to Hold for 7 Days No Reported GI Symptoms          Musculoskeletal:  Arthritis               Neurological:  Denies TIA.  Denies CVA.    Denies Seizures.                                Endocrine:  Denies Diabetes.         Denies Obesity / BMI > 30, Denies Morbid Obesity / BMI > 40  Psych:  Psychiatric History anxiety depression                Physical Exam  General: Well nourished, Cooperative,  Alert and Oriented    Airway:  Mallampati: II   Mouth Opening: Normal  TM Distance: Normal  Tongue: Normal  Neck ROM: Normal ROM        Anesthesia Plan  Type of Anesthesia, risks & benefits discussed:    Anesthesia Type: Gen ETT  Intra-op Monitoring Plan: Standard ASA Monitors  Post Op Pain Control Plan: multimodal analgesia and IV/PO Opioids PRN  Induction:  IV  Airway Plan: Direct and Video, Post-Induction  Informed Consent: Informed consent signed with the Patient and all parties understand the risks and agree with anesthesia plan.  All questions answered.   ASA Score: 2  Day of Surgery Review of History & Physical: H&P Update referred to the surgeon/provider.    Ready For Surgery From Anesthesia Perspective.     .           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     Current Outpatient Medications on File Prior to Encounter   Medication Sig Dispense Refill    adalimumab (HUMIRA,CF, PEN) 40 mg/0.4 mL PnKt Inject 0.4 mLs (40 mg total) into the skin every 7 days. 4 pen 11    atorvastatin (LIPITOR) 20 MG tablet Take  20 mg by mouth.      buPROPion (WELLBUTRIN XL) 150 MG TB24 tablet Take 1 tablet (150 mg total) by mouth once daily. 90 tablet 3    DULoxetine (CYMBALTA) 30 MG capsule Take 1 capsule (30 mg total) by mouth once daily. 90 capsule 3    estrogens,esterified,-methyltestosterone 0.625-1.25mg (ESTRATEST HS) per tablet Take 1 tablet by mouth once daily. 90 tablet 3    hydroxychloroquine (PLAQUENIL) 200 mg tablet Take 1 tablet (200 mg total) by mouth 2 (two) times daily. 180 tablet 5    indomethacin (INDOCIN) 50 MG capsule Take 1 capsule (50 mg total) by mouth daily as needed (pain). 90 capsule 1    medroxyPROGESTERone (PROVERA) 2.5 MG tablet Take 1 tablet (2.5 mg total) by mouth once daily. 90 tablet 3    pantoprazole (PROTONIX) 40 MG tablet Take 1 tablet (40 mg total) by mouth once daily. 90 tablet 3    tirzepatide, weight loss, 5 mg/0.5 mL Soln Inject 5 mg into the skin every 7 days.      tiZANidine (ZANAFLEX) 2 MG tablet Take 1 tablet (2 mg total) by mouth 2 (two) times daily. 90 tablet 0   [3]   Social History  Socioeconomic History    Marital status:     Number of children: 1   Tobacco Use    Smoking status: Former     Current packs/day: 0.50     Average packs/day: 0.5 packs/day for 30.0 years (15.0 ttl pk-yrs)     Types: Cigarettes, Vaping with nicotine     Passive exposure: Current    Smokeless tobacco: Never   Substance and Sexual Activity    Alcohol use: Yes     Alcohol/week: 8.0 standard drinks of alcohol     Types: 8 Glasses of wine per week     Comment: weekends    Drug use: Yes     Types: Amphetamines    Sexual activity: Yes     Partners: Male     Birth control/protection: Post-menopausal     Comment:    Social History Narrative    She is from [place].  She works for [employer] as an .   She is twice .  Her first   in .  She remarried in .  She has a 25 year-old daughter from her first marriage, who is getting   in October 2014.  She lives in [place] with her  and her 18-year-old step son.         Social Drivers of Health     Financial Resource Strain: Low Risk  (3/6/2025)    Overall Financial Resource Strain (CARDIA)     Difficulty of Paying Living Expenses: Not hard at all   Food Insecurity: No Food Insecurity (3/6/2025)    Hunger Vital Sign     Worried About Running Out of Food in the Last Year: Never true     Ran Out of Food in the Last Year: Never true   Transportation Needs: No Transportation Needs (3/6/2025)    PRAPARE - Transportation     Lack of Transportation (Medical): No     Lack of Transportation (Non-Medical): No   Physical Activity: Inactive (3/6/2025)    Exercise Vital Sign     Days of Exercise per Week: 0 days     Minutes of Exercise per Session: 30 min   Stress: No Stress Concern Present (1/18/2024)    [place] [employer] - Occupational Stress Questionnaire     Feeling of Stress : Not at all   Housing Stability: Low Risk  (3/6/2025)    Housing Stability Vital Sign     Unable to Pay for Housing in the Last Year: No     Number of Times Moved in the Last Year: 0     Homeless in the Last Year: No

## 2025-06-20 ENCOUNTER — ANESTHESIA (OUTPATIENT)
Dept: SURGERY | Facility: HOSPITAL | Age: 60
End: 2025-06-20
Payer: COMMERCIAL

## 2025-06-20 ENCOUNTER — HOSPITAL ENCOUNTER (OUTPATIENT)
Facility: HOSPITAL | Age: 60
Discharge: HOME | End: 2025-06-20
Attending: INTERNAL MEDICINE | Admitting: INTERNAL MEDICINE
Payer: COMMERCIAL

## 2025-06-20 ENCOUNTER — PATIENT MESSAGE (OUTPATIENT)
Dept: SURGERY | Facility: HOSPITAL | Age: 60
End: 2025-06-20
Payer: COMMERCIAL

## 2025-06-20 VITALS
WEIGHT: 164 LBS | SYSTOLIC BLOOD PRESSURE: 129 MMHG | BODY MASS INDEX: 24.86 KG/M2 | HEART RATE: 77 BPM | HEIGHT: 68 IN | RESPIRATION RATE: 16 BRPM | TEMPERATURE: 99 F | OXYGEN SATURATION: 95 % | DIASTOLIC BLOOD PRESSURE: 74 MMHG

## 2025-06-20 DIAGNOSIS — J43.2 CENTRILOBULAR EMPHYSEMA: ICD-10-CM

## 2025-06-20 DIAGNOSIS — R91.1 RIGHT LOWER LOBE PULMONARY NODULE: ICD-10-CM

## 2025-06-20 DIAGNOSIS — R91.1 RIGHT MIDDLE LOBE PULMONARY NODULE: ICD-10-CM

## 2025-06-20 LAB
GRAM STN SPEC: NORMAL
GRAM STN SPEC: NORMAL

## 2025-06-20 PROCEDURE — 87205 SMEAR GRAM STAIN: CPT | Performed by: INTERNAL MEDICINE

## 2025-06-20 PROCEDURE — 36000708 HC OR TIME LEV III 1ST 15 MIN: Performed by: INTERNAL MEDICINE

## 2025-06-20 PROCEDURE — 87206 SMEAR FLUORESCENT/ACID STAI: CPT | Performed by: INTERNAL MEDICINE

## 2025-06-20 PROCEDURE — 87206 SMEAR FLUORESCENT/ACID STAI: CPT | Mod: 59 | Performed by: INTERNAL MEDICINE

## 2025-06-20 PROCEDURE — 37000009 HC ANESTHESIA EA ADD 15 MINS: Performed by: INTERNAL MEDICINE

## 2025-06-20 PROCEDURE — 87116 MYCOBACTERIA CULTURE: CPT | Performed by: INTERNAL MEDICINE

## 2025-06-20 PROCEDURE — 31654 BRONCH EBUS IVNTJ PERPH LES: CPT | Mod: ,,, | Performed by: INTERNAL MEDICINE

## 2025-06-20 PROCEDURE — 31628 BRONCHOSCOPY/LUNG BX EACH: CPT | Mod: 51,,, | Performed by: INTERNAL MEDICINE

## 2025-06-20 PROCEDURE — 63600175 PHARM REV CODE 636 W HCPCS

## 2025-06-20 PROCEDURE — 87102 FUNGUS ISOLATION CULTURE: CPT | Performed by: INTERNAL MEDICINE

## 2025-06-20 PROCEDURE — 25000003 PHARM REV CODE 250

## 2025-06-20 PROCEDURE — 71000044 HC DOSC ROUTINE RECOVERY FIRST HOUR: Performed by: INTERNAL MEDICINE

## 2025-06-20 PROCEDURE — 27201423 OPTIME MED/SURG SUP & DEVICES STERILE SUPPLY: Performed by: INTERNAL MEDICINE

## 2025-06-20 PROCEDURE — 71000015 HC POSTOP RECOV 1ST HR: Performed by: INTERNAL MEDICINE

## 2025-06-20 PROCEDURE — 31627 NAVIGATIONAL BRONCHOSCOPY: CPT | Mod: ,,, | Performed by: INTERNAL MEDICINE

## 2025-06-20 PROCEDURE — D9220A PRA ANESTHESIA: Mod: ,,, | Performed by: ANESTHESIOLOGY

## 2025-06-20 PROCEDURE — 36000709 HC OR TIME LEV III EA ADD 15 MIN: Performed by: INTERNAL MEDICINE

## 2025-06-20 PROCEDURE — 31629 BRONCHOSCOPY/NEEDLE BX EACH: CPT | Mod: 51,,, | Performed by: INTERNAL MEDICINE

## 2025-06-20 PROCEDURE — 37000008 HC ANESTHESIA 1ST 15 MINUTES: Performed by: INTERNAL MEDICINE

## 2025-06-20 PROCEDURE — 87075 CULTR BACTERIA EXCEPT BLOOD: CPT | Performed by: INTERNAL MEDICINE

## 2025-06-20 PROCEDURE — 31624 DX BRONCHOSCOPE/LAVAGE: CPT | Mod: 51,,, | Performed by: INTERNAL MEDICINE

## 2025-06-20 PROCEDURE — 25000003 PHARM REV CODE 250: Performed by: INTERNAL MEDICINE

## 2025-06-20 PROCEDURE — 88342 IMHCHEM/IMCYTCHM 1ST ANTB: CPT | Mod: TC | Performed by: INTERNAL MEDICINE

## 2025-06-20 PROCEDURE — 31652 BRONCH EBUS SAMPLNG 1/2 NODE: CPT | Mod: ,,, | Performed by: INTERNAL MEDICINE

## 2025-06-20 PROCEDURE — 87070 CULTURE OTHR SPECIMN AEROBIC: CPT | Performed by: INTERNAL MEDICINE

## 2025-06-20 RX ORDER — HALOPERIDOL LACTATE 5 MG/ML
0.5 INJECTION, SOLUTION INTRAMUSCULAR EVERY 10 MIN PRN
Status: DISCONTINUED | OUTPATIENT
Start: 2025-06-20 | End: 2025-06-20 | Stop reason: HOSPADM

## 2025-06-20 RX ORDER — FENTANYL CITRATE 50 UG/ML
INJECTION, SOLUTION INTRAMUSCULAR; INTRAVENOUS
Status: DISCONTINUED | OUTPATIENT
Start: 2025-06-20 | End: 2025-06-20

## 2025-06-20 RX ORDER — GLUCAGON 1 MG
1 KIT INJECTION
Status: DISCONTINUED | OUTPATIENT
Start: 2025-06-20 | End: 2025-06-20 | Stop reason: HOSPADM

## 2025-06-20 RX ORDER — SODIUM CHLORIDE 0.9 % (FLUSH) 0.9 %
10 SYRINGE (ML) INJECTION
Status: DISCONTINUED | OUTPATIENT
Start: 2025-06-20 | End: 2025-06-20 | Stop reason: HOSPADM

## 2025-06-20 RX ORDER — PROPOFOL 10 MG/ML
VIAL (ML) INTRAVENOUS
Status: DISCONTINUED | OUTPATIENT
Start: 2025-06-20 | End: 2025-06-20

## 2025-06-20 RX ORDER — ONDANSETRON HYDROCHLORIDE 2 MG/ML
INJECTION, SOLUTION INTRAVENOUS
Status: DISCONTINUED | OUTPATIENT
Start: 2025-06-20 | End: 2025-06-20

## 2025-06-20 RX ORDER — OXYCODONE HYDROCHLORIDE 5 MG/1
10 TABLET ORAL EVERY 4 HOURS PRN
Status: DISCONTINUED | OUTPATIENT
Start: 2025-06-20 | End: 2025-06-20 | Stop reason: HOSPADM

## 2025-06-20 RX ORDER — MIDAZOLAM HYDROCHLORIDE 1 MG/ML
INJECTION INTRAMUSCULAR; INTRAVENOUS
Status: DISCONTINUED | OUTPATIENT
Start: 2025-06-20 | End: 2025-06-20

## 2025-06-20 RX ORDER — OXYCODONE HYDROCHLORIDE 5 MG/1
5 TABLET ORAL
Status: DISCONTINUED | OUTPATIENT
Start: 2025-06-20 | End: 2025-06-20 | Stop reason: HOSPADM

## 2025-06-20 RX ORDER — PHENYLEPHRINE HYDROCHLORIDE 10 MG/ML
INJECTION INTRAVENOUS
Status: DISCONTINUED | OUTPATIENT
Start: 2025-06-20 | End: 2025-06-20

## 2025-06-20 RX ORDER — DEXAMETHASONE SODIUM PHOSPHATE 4 MG/ML
INJECTION, SOLUTION INTRA-ARTICULAR; INTRALESIONAL; INTRAMUSCULAR; INTRAVENOUS; SOFT TISSUE
Status: DISCONTINUED | OUTPATIENT
Start: 2025-06-20 | End: 2025-06-20

## 2025-06-20 RX ORDER — ROCURONIUM BROMIDE 10 MG/ML
INJECTION, SOLUTION INTRAVENOUS
Status: DISCONTINUED | OUTPATIENT
Start: 2025-06-20 | End: 2025-06-20

## 2025-06-20 RX ORDER — LIDOCAINE HYDROCHLORIDE 20 MG/ML
INJECTION INTRAVENOUS
Status: DISCONTINUED | OUTPATIENT
Start: 2025-06-20 | End: 2025-06-20

## 2025-06-20 RX ORDER — SODIUM CHLORIDE 9 MG/ML
INJECTION, SOLUTION INTRAVENOUS CONTINUOUS
Status: DISCONTINUED | OUTPATIENT
Start: 2025-06-20 | End: 2025-06-20 | Stop reason: HOSPADM

## 2025-06-20 RX ADMIN — ROCURONIUM BROMIDE 50 MG: 10 INJECTION, SOLUTION INTRAVENOUS at 01:06

## 2025-06-20 RX ADMIN — FENTANYL CITRATE 50 MCG: 50 INJECTION, SOLUTION INTRAMUSCULAR; INTRAVENOUS at 01:06

## 2025-06-20 RX ADMIN — ONDANSETRON 4 MG: 2 INJECTION INTRAMUSCULAR; INTRAVENOUS at 02:06

## 2025-06-20 RX ADMIN — PHENYLEPHRINE HYDROCHLORIDE 100 MCG: 10 INJECTION INTRAVENOUS at 01:06

## 2025-06-20 RX ADMIN — DEXAMETHASONE SODIUM PHOSPHATE 8 MG: 4 INJECTION, SOLUTION INTRAMUSCULAR; INTRAVENOUS at 01:06

## 2025-06-20 RX ADMIN — LIDOCAINE HYDROCHLORIDE 60 MG: 20 INJECTION INTRAVENOUS at 01:06

## 2025-06-20 RX ADMIN — PROPOFOL 125 MCG/KG/MIN: 10 INJECTION, EMULSION INTRAVENOUS at 01:06

## 2025-06-20 RX ADMIN — PROPOFOL 140 MG: 10 INJECTION, EMULSION INTRAVENOUS at 01:06

## 2025-06-20 RX ADMIN — SUGAMMADEX 200 MG: 100 INJECTION, SOLUTION INTRAVENOUS at 02:06

## 2025-06-20 RX ADMIN — MIDAZOLAM HYDROCHLORIDE 2 MG: 2 INJECTION, SOLUTION INTRAMUSCULAR; INTRAVENOUS at 01:06

## 2025-06-20 RX ADMIN — SODIUM CHLORIDE: 0.9 INJECTION, SOLUTION INTRAVENOUS at 01:06

## 2025-06-20 NOTE — ANESTHESIA POSTPROCEDURE EVALUATION
Anesthesia Post Evaluation    Patient: Carly Carmen    Procedure(s) Performed: Procedure(s) (LRB):  ROBOTIC BRONCHOSCOPY (N/A)    Final Anesthesia Type: general      Patient location during evaluation: PACU  Patient participation: Yes- Able to Participate  Level of consciousness: awake  Post-procedure vital signs: reviewed and stable  Pain management: adequate  Airway patency: patent    PONV status at discharge: No PONV  Anesthetic complications: no      Cardiovascular status: blood pressure returned to baseline  Respiratory status: unassisted, spontaneous ventilation and room air                Vitals Value Taken Time   /72 06/20/25 15:02   Temp  06/20/25 15:06   Pulse 79 06/20/25 15:06   Resp 22 06/20/25 15:06   SpO2 91 % 06/20/25 15:06   Vitals shown include unfiled device data.      No case tracking events are documented in the log.      Pain/Kerry Score: Kerry Score: 10 (6/20/2025  3:00 PM)

## 2025-06-20 NOTE — PLAN OF CARE
Patient arrived to unit ambulating . Patient appears to be in no immediate distress. Patient prepared for surgery. Perioperative period discussed and all questions addressed.  at the bedside, call bell within reach, and bed in lowest position.

## 2025-06-20 NOTE — ANESTHESIA PROCEDURE NOTES
Intubation    Date/Time: 6/20/2025 1:28 PM    Performed by: Ophelia Pascual DO  Authorized by: Edin Cano Jr., MD    Intubation:     Induction:  Intravenous    Intubated:  Postinduction    Mask Ventilation:  Easy mask    Attempts:  1    Attempted By:  Resident anesthesiologist    Method of Intubation:  Video laryngoscopy    Blade:  Joshua 3    Laryngeal View Grade: Grade I - full view of cords      Difficult Airway Encountered?: No      Complications:  None    Airway Device:  Oral endotracheal tube    Airway Device Size:  8.0    Style/Cuff Inflation:  Cuffed    Tube secured:  22    Secured at:  The lips    Placement Verified By:  Capnometry and Revisualization with laryngoscopy    Complicating Factors:  None    Findings Post-Intubation:  BS equal bilateral and atraumatic/condition of teeth unchanged

## 2025-06-20 NOTE — TRANSFER OF CARE
"Anesthesia Transfer of Care Note    Patient: Carly Carmen    Procedure(s) Performed: Procedure(s) (LRB):  ROBOTIC BRONCHOSCOPY (N/A)    Patient location: [facility]    Anesthesia Type: general    Transport from OR: Transported from OR on 6-10 L/min O2 by face mask with adequate spontaneous ventilation    Post pain: adequate analgesia    Post assessment: no apparent anesthetic complications    Post vital signs: stable    Level of consciousness: alert and awake    Nausea/Vomiting: no nausea/vomiting    Complications: none    Transfer of care protocol was followed      Last vitals: Visit Vitals  /68 (BP Location: Left arm, Patient Position: Lying)   Pulse 79   Temp 37 °C (98.6 °F) (Temporal)   Resp 16   Ht 5' 8" (1.727 m)   Wt 74.4 kg (164 lb)   SpO2 97%   Breastfeeding No   BMI 24.94 kg/m²     "

## 2025-06-20 NOTE — DISCHARGE SUMMARY
[facility] - [facility] (1st Fl)  Discharge Note  Short Stay    Procedure(s) (LRB):  ROBOTIC BRONCHOSCOPY (N/A)      OUTCOME: Patient tolerated treatment/procedure well without complication and is now ready for discharge.    DISPOSITION: Home or Self Care    FINAL DIAGNOSIS:  pulmonary nodule    FOLLOWUP: will call with results    DISCHARGE INSTRUCTIONS:    Discharge Procedure Orders   Notify your health care provider if you experience any of the following:  severe uncontrolled pain     Notify your health care provider if you experience any of the following:  difficulty breathing or increased cough     Activity as tolerated         Clinical Reference Documents Added to Patient Instructions         Document    BRONCHOSCOPY, DIAGNOSTIC (ENGLISH)            TIME SPENT ON DISCHARGE: 5 minutes    Rick Suh MD  Pulmonary/Critical Care Fellow

## 2025-06-21 LAB — ACID FAST MOD KINY STN SPEC: NORMAL

## 2025-06-23 LAB
ACID FAST MOD KINY STN SPEC: NORMAL
FUNGUS SPEC CULT: NORMAL

## 2025-06-24 RX ORDER — BUPROPION HYDROCHLORIDE 300 MG/1
300 TABLET ORAL DAILY
Qty: 90 TABLET | Refills: 3 | Status: SHIPPED | OUTPATIENT
Start: 2025-06-24

## 2025-06-25 LAB
ESTROGEN SERPL-MCNC: NORMAL PG/ML
INSULIN SERPL-ACNC: NORMAL U[IU]/ML
LAB AP CLINICAL INFORMATION: NORMAL
LAB AP COMMENTS: NORMAL
LAB AP GROSS DESCRIPTION: NORMAL
LAB AP NON-GYN INTERPRETATION SPECIMEN 1: NORMAL
LAB AP NON-GYN INTERPRETATION SPECIMEN 2: NORMAL
LAB AP NON-GYN INTERPRETATION SPECIMEN 3: NORMAL
LAB AP NON-GYN INTERPRETATION SPECIMEN 4: NORMAL
LAB AP PERFORMING LOCATION(S): NORMAL
LAB AP REPORT FOOTNOTES: NORMAL

## 2025-06-26 ENCOUNTER — TELEPHONE (OUTPATIENT)
Dept: PULMONOLOGY | Facility: CLINIC | Age: 60
End: 2025-06-26
Payer: COMMERCIAL

## 2025-06-26 DIAGNOSIS — R91.1 SOLITARY PULMONARY NODULE: Primary | ICD-10-CM

## 2025-06-26 NOTE — TELEPHONE ENCOUNTER
Reviewed biopsy results with Mrs Carmen.  Caseating necrosis c/w granuloma.  All cultures are negative thus far.  Recommend serial follow up in three months and cultures will be finalized then.  All questions were answered to her satisfaction and she understood the paln.

## 2025-06-27 LAB
BACTERIA SPEC ANAEROBE CULT: NORMAL
MYCOBACTERIUM SPEC QL CULT: NORMAL

## 2025-06-28 LAB — BACTERIA TISS AEROBE CULT: ABNORMAL

## 2025-07-02 ENCOUNTER — RESULTS FOLLOW-UP (OUTPATIENT)
Dept: PULMONOLOGY | Facility: CLINIC | Age: 60
End: 2025-07-02
Payer: COMMERCIAL

## 2025-07-02 RX ORDER — SULFAMETHOXAZOLE AND TRIMETHOPRIM 800; 160 MG/1; MG/1
1 TABLET ORAL 2 TIMES DAILY
Qty: 20 TABLET | Refills: 0 | Status: SHIPPED | OUTPATIENT
Start: 2025-07-02 | End: 2025-07-12

## 2025-07-07 ENCOUNTER — TELEPHONE (OUTPATIENT)
Dept: PULMONOLOGY | Facility: CLINIC | Age: 60
End: 2025-07-07
Payer: COMMERCIAL

## 2025-07-24 ENCOUNTER — PATIENT MESSAGE (OUTPATIENT)
Dept: ADMINISTRATIVE | Facility: OTHER | Age: 60
End: 2025-07-24
Payer: COMMERCIAL

## 2025-07-28 ENCOUNTER — OFFICE VISIT (OUTPATIENT)
Dept: FAMILY MEDICINE | Facility: CLINIC | Age: 60
End: 2025-07-28
Payer: COMMERCIAL

## 2025-07-28 VITALS
BODY MASS INDEX: 23.79 KG/M2 | DIASTOLIC BLOOD PRESSURE: 80 MMHG | OXYGEN SATURATION: 97 % | WEIGHT: 156.94 LBS | HEIGHT: 68 IN | HEART RATE: 94 BPM | SYSTOLIC BLOOD PRESSURE: 118 MMHG

## 2025-07-28 DIAGNOSIS — M06.00 SERONEGATIVE RHEUMATOID ARTHRITIS: Chronic | ICD-10-CM

## 2025-07-28 DIAGNOSIS — F41.9 ANXIETY: Primary | ICD-10-CM

## 2025-07-28 DIAGNOSIS — F32.A DEPRESSION, UNSPECIFIED DEPRESSION TYPE: Chronic | ICD-10-CM

## 2025-07-28 PROCEDURE — 1160F RVW MEDS BY RX/DR IN RCRD: CPT | Mod: CPTII,S$GLB,,

## 2025-07-28 PROCEDURE — 99999 PR PBB SHADOW E&M-EST. PATIENT-LVL IV: CPT | Mod: PBBFAC,,,

## 2025-07-28 PROCEDURE — 3044F HG A1C LEVEL LT 7.0%: CPT | Mod: CPTII,S$GLB,,

## 2025-07-28 PROCEDURE — 1159F MED LIST DOCD IN RCRD: CPT | Mod: CPTII,S$GLB,,

## 2025-07-28 PROCEDURE — 3008F BODY MASS INDEX DOCD: CPT | Mod: CPTII,S$GLB,,

## 2025-07-28 PROCEDURE — 3079F DIAST BP 80-89 MM HG: CPT | Mod: CPTII,S$GLB,,

## 2025-07-28 PROCEDURE — 99214 OFFICE O/P EST MOD 30 MIN: CPT | Mod: S$GLB,,,

## 2025-07-28 PROCEDURE — 3074F SYST BP LT 130 MM HG: CPT | Mod: CPTII,S$GLB,,

## 2025-07-28 NOTE — PROGRESS NOTES
Subjective:              Chief Complaint: weight managment     Carly Carmen is a 59 y.o. female, patient of Allen Rao MD with piriformis syndrome right side, anxiety, allergic rhinitis, hyperlipidemia, arthralgia of hip, sciatica right side, osteopenia of lumbar spine, IT band syndrome right side, polyarthralgia, known to me, presents today for 2 month follow up for weight managment      History of Present Illness    CHIEF COMPLAINT:  Carly presents today for two-month follow-up regarding weight management. Overall feeling well.     WEIGHT MANAGEMENT:  She has lost significant weight from 185 lbs to 156 lbs on tirzepatide 4 mg, with approximately 8 lbs lost in the first month. She reports high satisfaction with the medication's effectiveness and denies any side effects. Previous trial of Phentermine was ineffective. She expresses commitment to maintaining weight loss through continued medication use.    DIET AND LIFESTYLE:  She reports actively trying to eat healthier, primarily through portion control. She takes Vitamin D3 3,000 units daily. Her fluid intake consists mainly of coffee and diet Dr. Pepper throughout the day until bedtime, and she acknowledges need to increase water intake for better hydration. She denies tobacco use.    EXERCISE AND ARTHRITIS:  She uses a stationary bike, aiming for 30 minutes daily but typically completing 15 minutes due to boredom. Previously, she biked 8 miles daily outdoors before her arthritis diagnosis. She maintains activity through yard work and stationary bike use. She has rheumatoid arthritis affecting multiple joints including hands, hip, and knee. Joint pain is present but not as severe as visual appearance suggests. She continues Cymbalta and Plaquenil for arthritis management under Dr. Joseph's care.    GI:  She takes Miralax as needed for bowel regularity.      ROS:  General: -fever, -chills, -fatigue, -weight gain, -weight loss  Eyes: -vision changes,  "-redness, -discharge  ENT: -ear pain, -nasal congestion, -sore throat  Cardiovascular: -chest pain, -palpitations, -lower extremity edema  Respiratory: -cough, -shortness of breath  Gastrointestinal: -abdominal pain, -nausea, -vomiting, -diarrhea, +constipation, -blood in stool  Genitourinary: -dysuria, -hematuria, -frequency  Musculoskeletal: +joint pain, -muscle pain  Skin: -rash, -lesion  Neurological: -headache, -dizziness, -numbness, -tingling  Psychiatric: +anxiety, -depression, -sleep difficulty              Objective:     Vitals:    07/28/25 1028   BP: 118/80   BP Location: Left arm   Patient Position: Sitting   Pulse: 94   SpO2: 97%   Weight: 71.2 kg (156 lb 15.5 oz)   Height: 5' 8" (1.727 m)          Physical Exam    Vitals: Weight: 156.97 lbs. BMI: 23. BMI within normal limits (18.5 - 24.9).  General: No acute distress. Well-developed. Well-nourished.  Eyes: EOMI. Sclerae anicteric.  HENT: Normocephalic. Atraumatic.  Cardiovascular: Regular rate. Regular rhythm. No murmurs.   Respiratory: Normal respiratory effort. Clear to auscultation bilaterally  Extremities: No lower extremity edema.  Neurological: Alert & oriented x3.   Psychiatric: Normal mood.   Skin: Warm. Dry. No rash.            Laboratory:  CBC:  No results for input(s): "WBC", "RBC", "HGB", "HCT", "PLT", "MCV", "MCH", "MCHC" in the last 2160 hours.  CMP:  No results for input(s): "GLU", "CALCIUM", "ALBUMIN", "PROT", "NA", "K", "CO2", "CL", "BUN", "ALKPHOS", "ALT", "AST", "BILITOT" in the last 2160 hours.    Invalid input(s): "CREATININ"  URINALYSIS:  Recent Labs   Lab Result Units 05/13/25  1121   Color, UA  Yellow   Spec Grav UA  >=1.030*   pH, UA  6.0   Protein, UA  1+*   Bacteria, UA /HPF Many*   Nitrites, UA  Negative   Leukocyte Esterase, UA  2+*   Urobilinogen, UA EU/dL Negative   Hyaline Casts, UA /LPF 0      LIPIDS:  No results for input(s): "TSH", "HDL", "CHOL", "TRIG", "LDLCALC", "CHOLHDL", "NONHDLCHOL", "TOTALCHOLEST" in the last " "2160 hours.  TSH:  No results for input(s): "TSH" in the last 2160 hours.  A1C:  No results for input(s): "HGBA1C" in the last 2160 hours.    Assessment:         ICD-10-CM ICD-9-CM   1. Anxiety  F41.9 300.00   2. Depression, unspecified depression type  F32.A 311   3. Seronegative rheumatoid arthritis  M06.00 714.0   4. BMI 28.0-28.9,adult  Z68.28 V85.24       Plan:       Anxiety  Depression, unspecified depression type  -Controlled with wellbutrin 300 mg daily.   -Continue as prescribed.     Seronegative rheumatoid arthritis  - Carly reports arthritis pain in hand, hip, and knee, though not as severe as it appears.  - Currently managing symptoms with Cymbalta and Plaquenil with good effect.  - Will continue current treatment regimen under the care of Dr. Joseph.    BMI 28.0-28.9,adult  - BMI 23, indicating successful weight reduction with 24 lb weight loss over 4 months.  - Continue tirzepatide 4 mg weekly injections from [facility], which has shown good efficacy without side effects.  - Discussed potential for weight regain if medication is discontinued without lifestyle changes.  - Recommend increasing exercise duration on stationary bike from 15 minutes to 30 minutes if possible.  - Educated patient on importance of hydration.  - Instructed to increase water intake to at least 2 glasses per day, with a goal of 4 glasses.  - Advised to replace at least 2 diet sodas with water daily.    FOLLOW-UP:  - Follow up in October.  - Carly to contact the office when refill of tirzepatide is needed.          If symptoms worsen, go to ER.  If symptoms do not improve, return to clinic.   Keep appointments with all specialists.     Patient verbalizes understanding and agrees with current treatment plan.      Follow up in about 3 months (around 10/15/2025) for est care, follow up on routine chronic conditions.     Patient's Medications   New Prescriptions    No medications on file   Previous Medications    ADALIMUMAB " (HUMIRA,CF, PEN) 40 MG/0.4 ML PNKT    Inject 0.4 mLs (40 mg total) into the skin every 7 days.    ATORVASTATIN (LIPITOR) 20 MG TABLET    Take 20 mg by mouth.    BUPROPION (WELLBUTRIN XL) 300 MG 24 HR TABLET    Take 1 tablet (300 mg total) by mouth once daily.    CHOLECALCIFEROL, VITAMIN D3, (VITAMIN D3 ORAL)    Take 1 capsule by mouth Daily. 3000 iu capsule    DULOXETINE (CYMBALTA) 30 MG CAPSULE    Take 1 capsule (30 mg total) by mouth once daily.    ESTROGENS,ESTERIFIED,-METHYLTESTOSTERONE 0.625-1.25MG (ESTRATEST HS) PER TABLET    Take 1 tablet by mouth once daily.    HYDROXYCHLOROQUINE (PLAQUENIL) 200 MG TABLET    Take 1 tablet (200 mg total) by mouth 2 (two) times daily.    INDOMETHACIN (INDOCIN) 50 MG CAPSULE    Take 1 capsule (50 mg total) by mouth daily as needed (pain).    MEDROXYPROGESTERONE (PROVERA) 2.5 MG TABLET    Take 1 tablet (2.5 mg total) by mouth once daily.    PANTOPRAZOLE (PROTONIX) 40 MG TABLET    Take 1 tablet (40 mg total) by mouth once daily.    TIRZEPATIDE, WEIGHT LOSS, 5 MG/0.5 ML SOLN    Inject 5 mg into the skin every 7 days.    TIZANIDINE (ZANAFLEX) 2 MG TABLET    Take 1 tablet (2 mg total) by mouth 2 (two) times daily.   Modified Medications    No medications on file   Discontinued Medications    No medications on file       This note was generated with the assistance of ambient listening technology. Verbal consent was obtained by the patient and accompanying visitor(s) for the recording of patient appointment to facilitate this note. I attest to having reviewed and edited the generated note for accuracy, though some syntax or spelling errors may persist. Please contact the author of this note for any clarification.         Brandy Sawyer NP

## 2025-08-13 DIAGNOSIS — Z12.31 OTHER SCREENING MAMMOGRAM: ICD-10-CM

## 2025-08-18 ENCOUNTER — PATIENT MESSAGE (OUTPATIENT)
Dept: ADMINISTRATIVE | Facility: OTHER | Age: 60
End: 2025-08-18
Payer: COMMERCIAL

## 2025-08-21 ENCOUNTER — TELEPHONE (OUTPATIENT)
Dept: OBSTETRICS AND GYNECOLOGY | Facility: CLINIC | Age: 60
End: 2025-08-21
Payer: COMMERCIAL

## 2025-08-21 DIAGNOSIS — Z79.890 HORMONE REPLACEMENT THERAPY (HRT): ICD-10-CM

## 2025-08-21 RX ORDER — ESTERIFIED ESTROGENS AND METHYLTESTOSTERONE .625; 1.25 MG/1; MG/1
1 TABLET, FILM COATED ORAL DAILY
Qty: 90 TABLET | Refills: 3 | Status: SHIPPED | OUTPATIENT
Start: 2025-08-21 | End: 2025-08-22 | Stop reason: SDUPTHER

## 2025-08-22 ENCOUNTER — OFFICE VISIT (OUTPATIENT)
Facility: CLINIC | Age: 60
End: 2025-08-22
Payer: COMMERCIAL

## 2025-08-22 VITALS
DIASTOLIC BLOOD PRESSURE: 78 MMHG | WEIGHT: 156.31 LBS | SYSTOLIC BLOOD PRESSURE: 118 MMHG | BODY MASS INDEX: 23.77 KG/M2

## 2025-08-22 DIAGNOSIS — Z01.419 WELL WOMAN EXAM WITH ROUTINE GYNECOLOGICAL EXAM: Primary | ICD-10-CM

## 2025-08-22 DIAGNOSIS — Z79.890 HORMONE REPLACEMENT THERAPY (HRT): ICD-10-CM

## 2025-08-22 PROCEDURE — 99999 PR PBB SHADOW E&M-EST. PATIENT-LVL III: CPT | Mod: PBBFAC,,, | Performed by: STUDENT IN AN ORGANIZED HEALTH CARE EDUCATION/TRAINING PROGRAM

## 2025-08-22 RX ORDER — ESTERIFIED ESTROGENS AND METHYLTESTOSTERONE .625; 1.25 MG/1; MG/1
1 TABLET, FILM COATED ORAL DAILY
Qty: 90 TABLET | Refills: 3 | Status: SHIPPED | OUTPATIENT
Start: 2025-08-22

## (undated) DEVICE — SPONGE COTTON TRAY 4X4IN

## (undated) DEVICE — BOWL STERILE LARGE 32OZ

## (undated) DEVICE — SYS LABEL CORRECT MED

## (undated) DEVICE — DRAPE THREE-QTR REINF 53X77IN

## (undated) DEVICE — KIT ANTIFOG W/SPONG & FLUID

## (undated) DEVICE — PENCIL GOLF STERILE

## (undated) DEVICE — NDL ASPIRATING VIZISHOT 20-40M

## (undated) DEVICE — NDL FLTR 5MCRN BLNT TIP 18GX1

## (undated) DEVICE — ADAPTER VISION PROBE & SUCTION

## (undated) DEVICE — CONNECTOR SWIVEL

## (undated) DEVICE — SYR 10CC LUER LOCK

## (undated) DEVICE — SYR SLIP TIP 20CC

## (undated) DEVICE — ADAPTER SWIVEL

## (undated) DEVICE — BAG ION VISION PROBE

## (undated) DEVICE — TUBING SUC UNIV W/CONN 12FT

## (undated) DEVICE — CONTAINER SPECIMEN OR STER 4OZ

## (undated) DEVICE — GOWN POLY REINF BRTH SLV XL

## (undated) DEVICE — NDL HYPO REG 25G X 1 1/2

## (undated) DEVICE — PACK ECLIPSE SET-UP W/O DRAPE